# Patient Record
Sex: MALE | Race: WHITE | NOT HISPANIC OR LATINO | Employment: FULL TIME | URBAN - METROPOLITAN AREA
[De-identification: names, ages, dates, MRNs, and addresses within clinical notes are randomized per-mention and may not be internally consistent; named-entity substitution may affect disease eponyms.]

---

## 2017-01-28 ENCOUNTER — TRANSFERRED RECORDS (OUTPATIENT)
Dept: HEALTH INFORMATION MANAGEMENT | Facility: CLINIC | Age: 47
End: 2017-01-28

## 2017-02-02 ENCOUNTER — OFFICE VISIT (OUTPATIENT)
Dept: FAMILY MEDICINE | Facility: CLINIC | Age: 47
End: 2017-02-02
Payer: COMMERCIAL

## 2017-02-02 VITALS
TEMPERATURE: 98 F | BODY MASS INDEX: 29.91 KG/M2 | WEIGHT: 191 LBS | SYSTOLIC BLOOD PRESSURE: 138 MMHG | DIASTOLIC BLOOD PRESSURE: 78 MMHG | OXYGEN SATURATION: 96 % | HEART RATE: 110 BPM

## 2017-02-02 DIAGNOSIS — I10 HYPERTENSION GOAL BP (BLOOD PRESSURE) < 140/90: ICD-10-CM

## 2017-02-02 DIAGNOSIS — N28.9 ACUTE KIDNEY INSUFFICIENCY: Primary | ICD-10-CM

## 2017-02-02 DIAGNOSIS — F33.41 RECURRENT MAJOR DEPRESSIVE DISORDER, IN PARTIAL REMISSION (H): ICD-10-CM

## 2017-02-02 DIAGNOSIS — F41.1 GAD (GENERALIZED ANXIETY DISORDER): ICD-10-CM

## 2017-02-02 LAB
ALBUMIN UR-MCNC: 30 MG/DL
APPEARANCE UR: CLEAR
BACTERIA #/AREA URNS HPF: ABNORMAL /HPF
BILIRUB UR QL STRIP: ABNORMAL
COLOR UR AUTO: YELLOW
GLUCOSE UR STRIP-MCNC: NEGATIVE MG/DL
HGB UR QL STRIP: NEGATIVE
KETONES UR STRIP-MCNC: NEGATIVE MG/DL
LEUKOCYTE ESTERASE UR QL STRIP: NEGATIVE
MUCOUS THREADS #/AREA URNS LPF: PRESENT /LPF
NITRATE UR QL: NEGATIVE
NON-SQ EPI CELLS #/AREA URNS LPF: ABNORMAL /LPF
PH UR STRIP: 6 PH (ref 5–7)
RBC #/AREA URNS AUTO: ABNORMAL /HPF (ref 0–2)
SP GR UR STRIP: >1.03 (ref 1–1.03)
URN SPEC COLLECT METH UR: ABNORMAL
UROBILINOGEN UR STRIP-ACNC: 0.2 EU/DL (ref 0.2–1)
WBC #/AREA URNS AUTO: ABNORMAL /HPF (ref 0–2)

## 2017-02-02 PROCEDURE — 81001 URINALYSIS AUTO W/SCOPE: CPT | Performed by: FAMILY MEDICINE

## 2017-02-02 PROCEDURE — 36415 COLL VENOUS BLD VENIPUNCTURE: CPT | Performed by: FAMILY MEDICINE

## 2017-02-02 PROCEDURE — 80053 COMPREHEN METABOLIC PANEL: CPT | Performed by: FAMILY MEDICINE

## 2017-02-02 PROCEDURE — 99213 OFFICE O/P EST LOW 20 MIN: CPT | Performed by: FAMILY MEDICINE

## 2017-02-02 RX ORDER — VENLAFAXINE HYDROCHLORIDE 75 MG/1
225 CAPSULE, EXTENDED RELEASE ORAL EVERY MORNING
Qty: 90 CAPSULE | Refills: 1 | Status: SHIPPED | OUTPATIENT
Start: 2017-02-02 | End: 2017-02-02

## 2017-02-02 RX ORDER — VENLAFAXINE HYDROCHLORIDE 75 MG/1
225 CAPSULE, EXTENDED RELEASE ORAL DAILY
Qty: 270 CAPSULE | Refills: 1 | Status: SHIPPED | OUTPATIENT
Start: 2017-02-02 | End: 2017-08-24

## 2017-02-02 NOTE — NURSING NOTE
"No chief complaint on file.      Initial /78 mmHg  Pulse 110  Temp(Src) 98  F (36.7  C) (Oral)  Wt 191 lb (86.637 kg)  SpO2 96% Estimated body mass index is 29.91 kg/(m^2) as calculated from the following:    Height as of 8/31/16: 5' 7\" (1.702 m).    Weight as of this encounter: 191 lb (86.637 kg).  BP completed using cuff size: regular    Grace Desai MA    "

## 2017-02-02 NOTE — PROGRESS NOTES
SUBJECTIVE:                                                    Candelario Evangelista is a 47 year old male who presents to clinic today for the following health issues:    1/27/16: He was feeling light headed and dizzy, fell at work, blood pressure was checked and was 77/58 at its lowest. He went to the ER and rec'd 3 L of fluid, had EKG, BP's 109-126/70's while in the ER. He was also told that his creatinine was very high. He hadn't had much to eat or drink for more than 24 hours before.  Discharged after 4 hours.    History of Present Illness  Hypertension:     Outpatient blood pressures:  Are not being checked    Dietary sodium intake::  Not monitoring salt intake  Diet::  Regular (no restrictions)  Frequency of exercise::  1 day/week  Duration of exercise::  15-30 minutes  Taking medications regularly::  Yes  Medication side effects::  Lightheadedness  Additional concerns today::  YES      Pt would also like to discuss medications.     Problem list and histories reviewed & adjusted, as indicated.  Additional history: as documented    FRANDY/MMD  PHQ-9 SCORE 3/2/2016 8/31/2016 12/22/2016   Total Score - - -   Total Score 7 8 7    no change, feeling tired since was sick, looking for a house to live in currently so a little stress.    Patient Active Problem List   Diagnosis     Moderate recurrent major depression (H)     Generalized anxiety disorder     Internal hemorrhoids     Blood in stool     CARDIOVASCULAR SCREENING; LDL GOAL LESS THAN 160     Hypertension goal BP (blood pressure) < 140/90     Intermittent asthma     Overweight (BMI 25.0-29.9)     MRSA (methicillin resistant staph aureus) culture positive     Lump of axilla     Thumb laceration     Weight loss     Diverticulosis of large intestine     External hemorrhoids     Abdominal pain, generalized     Laryngitis     Past Surgical History   Procedure Laterality Date     Hernia repair, inguinal rt/lt       Hernia repair, inguinal rt/lt         Social History    Substance Use Topics     Smoking status: Never Smoker      Smokeless tobacco: Never Used     Alcohol Use: No     Family History   Problem Relation Age of Onset     C.A.D. Mother      C.A.D. Father      DIABETES Father      CEREBROVASCULAR DISEASE Father      Hypertension Mother      ?     HEART DISEASE Mother      Hypertension Father      ?     Cancer - colorectal Father          Allergies   Allergen Reactions     Latex Rash       ROS:  Constitutional, HEENT, cardiovascular, pulmonary, gi and gu systems are negative, except as otherwise noted.    OBJECTIVE:                                                    /78 mmHg  Pulse 110  Temp(Src) 98  F (36.7  C) (Oral)  Wt 191 lb (86.637 kg)  SpO2 96%  Body mass index is 29.91 kg/(m^2).  GENERAL: healthy, alert and no distress  NECK: no adenopathy, no asymmetry, masses, or scars and thyroid normal to palpation  RESP: lungs clear to auscultation - no rales, rhonchi or wheezes  CV: regular rate and rhythm, normal S1 S2, no S3 or S4, no murmur, click or rub, no peripheral edema and peripheral pulses strong  ABDOMEN: soft, nontender, no hepatosplenomegaly, no masses and bowel sounds normal  MS: no gross musculoskeletal defects noted, no edema    Diagnostic Test Results:  Results for orders placed or performed in visit on 02/02/17 (from the past 24 hour(s))   UA reflex to Microscopic and Culture   Result Value Ref Range    Color Urine Yellow     Appearance Urine Clear     Glucose Urine Negative NEG mg/dL    Bilirubin Urine (A) NEG     Small  This is an unconfirmed screening test result. A positive result may be false.      Ketones Urine Negative NEG mg/dL    Specific Gravity Urine >1.030 1.003 - 1.035    Blood Urine Negative NEG    pH Urine 6.0 5.0 - 7.0 pH    Protein Albumin Urine 30 (A) NEG mg/dL    Urobilinogen Urine 0.2 0.2 - 1.0 EU/dL    Nitrite Urine Negative NEG    Leukocyte Esterase Urine Negative NEG    Source Midstream Urine    Urine Microscopic   Result  Value Ref Range    WBC Urine O - 2 0 - 2 /HPF    RBC Urine O - 2 0 - 2 /HPF    Squamous Epithelial /LPF Urine Few FEW /LPF    Bacteria Urine Moderate (A) NEG /HPF    Mucous Urine Present (A) NEG /LPF        ASSESSMENT/PLAN:                                                    1. Acute kidney insufficiency  Dehydrated, recheck today shows SG still high, GFR and Cr still pending  Encourage lots of fluids  - UA reflex to Microscopic and Culture  - Comprehensive metabolic panel  - Urine Microscopic    2. Hypertension goal BP (blood pressure) < 140/90  BP Readings from Last 3 Encounters:   02/02/17 138/78   12/22/16 142/100   08/31/16 130/90    improved, follow closely    3. FRANDY (generalized anxiety disorder)  stable    4. Recurrent major depressive disorder, in partial remission (H)  Stable, he wants to continue on current dosage  - venlafaxine (EFFEXOR-XR) 75 MG 24 hr capsule; Take 3 capsules (225 mg) by mouth daily  Dispense: 270 capsule; Refill: 1      Norma Servin MD  [facility]  Answers for HPI/ROS submitted by the patient on 2/2/2017   PHQ-2 Depressed: Several days, Several days  PHQ-2 Score: 2

## 2017-02-02 NOTE — Clinical Note
[facility]   [address] [place]   [address]  [phone]    February 6, 2017      Candelario Evangelista  [address]  [facility] [address]              Dear Joaquín Jean CarlosDavid!  It was a pleasure as always to see you in clinic!  Thank you for getting labs done.     Great news, your kidneys have improved completely!  You must have had enough fluids, you're doing a great job of taking good care of yourself.     If you have any questions, please contact the clinic or schedule an appointment with me, thank you!    Results for orders placed or performed in visit on 02/02/17   UA reflex to Microscopic and Culture   Result Value Ref Range    Color Urine Yellow     Appearance Urine Clear     Glucose Urine Negative NEG mg/dL    Bilirubin Urine (A) NEG     Small  This is an unconfirmed screening test result. A positive result may be false.      Ketones Urine Negative NEG mg/dL    Specific Gravity Urine >1.030 1.003 - 1.035    Blood Urine Negative NEG    pH Urine 6.0 5.0 - 7.0 pH    Protein Albumin Urine 30 (A) NEG mg/dL    Urobilinogen Urine 0.2 0.2 - 1.0 EU/dL    Nitrite Urine Negative NEG    Leukocyte Esterase Urine Negative NEG    Source Midstream Urine    Comprehensive metabolic panel   Result Value Ref Range    Sodium 141 133 - 144 mmol/L    Potassium 3.8 3.4 - 5.3 mmol/L    Chloride 106 94 - 109 mmol/L    Carbon Dioxide 28 20 - 32 mmol/L    Anion Gap 7 3 - 14 mmol/L    Glucose 78 70 - 99 mg/dL    Urea Nitrogen 11 7 - 30 mg/dL    Creatinine 0.94 0.66 - 1.25 mg/dL    GFR Estimate 86 >60 mL/min/1.7m2    GFR Estimate If Black >90   GFR Calc   >60 mL/min/1.7m2    Calcium 9.1 8.5 - 10.1 mg/dL    Bilirubin Total 0.5 0.2 - 1.3 mg/dL    Albumin 3.7 3.4 - 5.0 g/dL    Protein Total 7.2 6.8 - 8.8 g/dL    Alkaline Phosphatase 140 40 - 150 U/L    ALT 26 0 - 70 U/L    AST 18 0 - 45 U/L   Urine Microscopic   Result Value Ref Range    WBC Urine O - 2 0 - 2 /HPF    RBC Urine O - 2  0 - 2 /HPF    Squamous Epithelial /LPF Urine Few FEW /LPF    Bacteria Urine Moderate (A) NEG /HPF    Mucous Urine Present (A) NEG /LPF           Sincerely,    Norma Servin MD/nr

## 2017-02-02 NOTE — PATIENT INSTRUCTIONS
Check your blood pressure several times a week and schedule an appointment if it's higher than 140/90.

## 2017-02-02 NOTE — MR AVS SNAPSHOT
"              After Visit Summary   2/2/2017    Candelario Evangelista    MRN: [id number]           Patient Information     Date Of Birth          1970        Visit Information        Provider Department      2/2/2017 11:00 AM Nroma Servin MD [facility]        Today's Diagnoses     Acute kidney insufficiency    -  1     Hypertension goal BP (blood pressure) < 140/90           Care Instructions    Check your blood pressure several times a week and schedule an appointment if it's higher than 140/90.        Follow-ups after your visit        Who to contact     If you have questions or need follow up information about today's clinic visit or your schedule please contact [facility] directly at [phone].  Normal or non-critical lab and imaging results will be communicated to you by MyChart, letter or phone within 4 business days after the clinic has received the results. If you do not hear from us within 7 days, please contact the clinic through MyChart or phone. If you have a critical or abnormal lab result, we will notify you by phone as soon as possible.  Submit refill requests through [employer] or call your pharmacy and they will forward the refill request to us. Please allow 3 business days for your refill to be completed.          Additional Information About Your Visit        MyChart Information     [employer] lets you send messages to your doctor, view your test results, renew your prescriptions, schedule appointments and more. To sign up, go to [email or web address] . Click on \"Log in\" on the left side of the screen, which will take you to the Welcome page. Then click on \"Sign up Now\" on the right side of the page.     You will be asked to enter the access code listed below, as well as some personal information. Please follow the directions to create your username and password.     Your access code is: [email or web address]  Expires: 3/22/2017  2:01 PM     Your access code will "  in 90 days. If you need help or a new code, please call your [place] clinic or [phone].        Care EveryWhere ID     This is your Care EveryWhere ID. This could be used by other organizations to access your [place] medical records  [id number]        Your Vitals Were     Pulse Temperature Pulse Oximetry             110 98  F (36.7  C) (Oral) 96%          Blood Pressure from Last 3 Encounters:   17 138/78   16 142/100   16 130/90    Weight from Last 3 Encounters:   17 191 lb (86.637 kg)   16 195 lb (88.451 kg)   16 195 lb (88.451 kg)              We Performed the Following     Comprehensive metabolic panel     UA reflex to Microscopic and Culture        Primary Care Provider Office Phone # Fax #    Norma Servin [phone]       [facility] [address]  [facility] [address]        Thank you!     Thank you for choosing [facility]  for your care. Our goal is always to provide you with excellent care. Hearing back from our patients is one way we can continue to improve our services. Please take a few minutes to complete the written survey that you may receive in the mail after your visit with us. Thank you!             Your Updated Medication List - Protect others around you: Learn how to safely use, store and throw away your medicines at www.disposemymeds.org.          This list is accurate as of: 17 11:32 AM.  Always use your most recent med list.                   Brand Name Dispense Instructions for use    ibuprofen 200 MG tablet    ADVIL/MOTRIN     Take 1-2 tablets (200-400 mg) by mouth every 8 hours as needed for mild pain       lisinopril 20 MG tablet    PRINIVIL/ZESTRIL    90 tablet    Take 1 tablet (20 mg) by mouth daily       venlafaxine 75 MG 24 hr capsule    EFFEXOR-XR    90 capsule    Take 3 capsules (225 mg) by mouth every morning Please schedule office appointment, thanks!

## 2017-02-03 LAB
ALBUMIN SERPL-MCNC: 3.7 G/DL (ref 3.4–5)
ALP SERPL-CCNC: 140 U/L (ref 40–150)
ALT SERPL W P-5'-P-CCNC: 26 U/L (ref 0–70)
ANION GAP SERPL CALCULATED.3IONS-SCNC: 7 MMOL/L (ref 3–14)
AST SERPL W P-5'-P-CCNC: 18 U/L (ref 0–45)
BILIRUB SERPL-MCNC: 0.5 MG/DL (ref 0.2–1.3)
BUN SERPL-MCNC: 11 MG/DL (ref 7–30)
CALCIUM SERPL-MCNC: 9.1 MG/DL (ref 8.5–10.1)
CHLORIDE SERPL-SCNC: 106 MMOL/L (ref 94–109)
CO2 SERPL-SCNC: 28 MMOL/L (ref 20–32)
CREAT SERPL-MCNC: 0.94 MG/DL (ref 0.66–1.25)
GFR SERPL CREATININE-BSD FRML MDRD: 86 ML/MIN/1.7M2
GLUCOSE SERPL-MCNC: 78 MG/DL (ref 70–99)
POTASSIUM SERPL-SCNC: 3.8 MMOL/L (ref 3.4–5.3)
PROT SERPL-MCNC: 7.2 G/DL (ref 6.8–8.8)
SODIUM SERPL-SCNC: 141 MMOL/L (ref 133–144)

## 2017-02-03 NOTE — PROGRESS NOTES
Quick Note:    Hello! It was a pleasure as always to see you in clinic! Thank you for getting labs done.     Great news, your kidneys have improved completely! You must have had enough fluids, you're doing a great job of taking good care of yourself.     If you have any questions, please contact the clinic or schedule an appointment with me, thank you!    Sincerely,  Dr. Norma Servin MD  2/3/2017  ______

## 2017-03-01 DIAGNOSIS — F41.1 GAD (GENERALIZED ANXIETY DISORDER): ICD-10-CM

## 2017-03-01 DIAGNOSIS — F33.41 RECURRENT MAJOR DEPRESSIVE DISORDER, IN PARTIAL REMISSION (H): ICD-10-CM

## 2017-03-02 NOTE — TELEPHONE ENCOUNTER
David,    Mr. Evangelista has a current rx on file, please close this encounter, I am unable to.    Thank you,  Michelle Mascorro CPhT  On behalf of [facility]

## 2017-03-03 RX ORDER — VENLAFAXINE HYDROCHLORIDE 75 MG/1
CAPSULE, EXTENDED RELEASE ORAL
Qty: 90 CAPSULE | Refills: 1 | OUTPATIENT
Start: 2017-03-03

## 2017-08-24 ENCOUNTER — TELEPHONE (OUTPATIENT)
Dept: FAMILY MEDICINE | Facility: CLINIC | Age: 47
End: 2017-08-24

## 2017-08-24 DIAGNOSIS — F33.41 RECURRENT MAJOR DEPRESSIVE DISORDER, IN PARTIAL REMISSION (H): ICD-10-CM

## 2017-08-24 NOTE — TELEPHONE ENCOUNTER
venlafaxine (EFFEXOR-XR) 75 MG 24 hr capsule    Last Written Prescription Date: 2/2/17  Last Fill Quantity: 270, # refills: 1  Last Office Visit with [facility], [facility] or [facility] prescribing provider: 2/2/17        BP Readings from Last 3 Encounters:   02/02/17 138/78   12/22/16 (!) 142/100   08/31/16 130/90     Pulse: (for Fetzima)  Creatinine   Date Value Ref Range Status   02/02/2017 0.94 0.66 - 1.25 mg/dL Final   ]    Last PHQ-9 score on record=   PHQ-9 SCORE 12/22/2016   Total Score -   Total Score 7

## 2017-08-28 RX ORDER — VENLAFAXINE HYDROCHLORIDE 75 MG/1
CAPSULE, EXTENDED RELEASE ORAL
Qty: 90 CAPSULE | Refills: 0 | Status: SHIPPED | OUTPATIENT
Start: 2017-08-28 | End: 2017-09-22

## 2017-08-28 NOTE — TELEPHONE ENCOUNTER
PHQ9 is due.  MA- please do one.    Last phq9 was high.  Sent in one month refill.  YESSENIA James

## 2017-09-22 RX ORDER — VENLAFAXINE HYDROCHLORIDE 75 MG/1
CAPSULE, EXTENDED RELEASE ORAL
Qty: 30 CAPSULE | Refills: 0 | Status: SHIPPED | OUTPATIENT
Start: 2017-09-22 | End: 2017-09-28

## 2017-09-22 NOTE — TELEPHONE ENCOUNTER
Reason for Call:  Medication or medication refill:    Do you use a [facility]?  Name of the pharmacy and phone number for the current request:  [facility] [place] - [phone]    Name of the medication requested: Venlafaxine    Other request: Pt has scheduled an appointment with Dr Servin for a medication renewal on 9/28. He only has a few left and was wondering if Dr. Servin can supply a few days worth in order for him to last until his appointment. Please see if Dr. Servin can approve thanks!    Can we leave a detailed message on this number? YES    Phone number patient can be reached at: Home number on file [phone] (home)    Best Time: anytime    Call taken on 9/22/2017 at 4:46 PM by Vivi Baca

## 2017-09-28 ENCOUNTER — OFFICE VISIT (OUTPATIENT)
Dept: FAMILY MEDICINE | Facility: CLINIC | Age: 47
End: 2017-09-28
Payer: COMMERCIAL

## 2017-09-28 VITALS
TEMPERATURE: 97.6 F | OXYGEN SATURATION: 99 % | WEIGHT: 185 LBS | BODY MASS INDEX: 29.03 KG/M2 | SYSTOLIC BLOOD PRESSURE: 119 MMHG | HEART RATE: 111 BPM | RESPIRATION RATE: 16 BRPM | DIASTOLIC BLOOD PRESSURE: 86 MMHG | HEIGHT: 67 IN

## 2017-09-28 DIAGNOSIS — F41.1 GENERALIZED ANXIETY DISORDER: ICD-10-CM

## 2017-09-28 DIAGNOSIS — F33.41 RECURRENT MAJOR DEPRESSIVE DISORDER, IN PARTIAL REMISSION (H): Primary | ICD-10-CM

## 2017-09-28 DIAGNOSIS — K14.8 TONGUE LESION: ICD-10-CM

## 2017-09-28 DIAGNOSIS — K08.9 POOR DENTITION: ICD-10-CM

## 2017-09-28 DIAGNOSIS — K12.0 ORAL APHTHAE: ICD-10-CM

## 2017-09-28 DIAGNOSIS — J45.20 INTERMITTENT ASTHMA, UNCOMPLICATED: ICD-10-CM

## 2017-09-28 PROCEDURE — 99214 OFFICE O/P EST MOD 30 MIN: CPT | Performed by: FAMILY MEDICINE

## 2017-09-28 RX ORDER — VENLAFAXINE HYDROCHLORIDE 75 MG/1
CAPSULE, EXTENDED RELEASE ORAL
Qty: 90 CAPSULE | Refills: 11 | Status: SHIPPED | OUTPATIENT
Start: 2017-09-28 | End: 2018-09-06

## 2017-09-28 ASSESSMENT — ANXIETY QUESTIONNAIRES
IF YOU CHECKED OFF ANY PROBLEMS ON THIS QUESTIONNAIRE, HOW DIFFICULT HAVE THESE PROBLEMS MADE IT FOR YOU TO DO YOUR WORK, TAKE CARE OF THINGS AT HOME, OR GET ALONG WITH OTHER PEOPLE: SOMEWHAT DIFFICULT
GAD7 TOTAL SCORE: 6
5. BEING SO RESTLESS THAT IT IS HARD TO SIT STILL: SEVERAL DAYS
1. FEELING NERVOUS, ANXIOUS, OR ON EDGE: SEVERAL DAYS
7. FEELING AFRAID AS IF SOMETHING AWFUL MIGHT HAPPEN: NOT AT ALL
2. NOT BEING ABLE TO STOP OR CONTROL WORRYING: SEVERAL DAYS
3. WORRYING TOO MUCH ABOUT DIFFERENT THINGS: SEVERAL DAYS
6. BECOMING EASILY ANNOYED OR IRRITABLE: SEVERAL DAYS

## 2017-09-28 ASSESSMENT — PATIENT HEALTH QUESTIONNAIRE - PHQ9
SUM OF ALL RESPONSES TO PHQ QUESTIONS 1-9: 5
5. POOR APPETITE OR OVEREATING: SEVERAL DAYS

## 2017-09-28 NOTE — LETTER
My Asthma Action Plan  Name: Candelario Evangelista   YOB: 1970  Date: 9/28/2017   My doctor: Norma Servin MD   My clinic: [facility]        My Control Medicine: None  My Rescue Medicine:    My Asthma Severity: intermittent  Avoid your asthma triggers:                GREEN ZONE   Good Control    I feel good    No cough or wheeze    Can work, sleep and play without asthma symptoms       Take your asthma control medicine every day.     1. If exercise triggers your asthma, take your rescue medication    15 minutes before exercise or sports, and    During exercise if you have asthma symptoms  2. Spacer to use with inhaler: If you have a spacer, make sure to use it with your inhaler             YELLOW ZONE Getting Worse  I have ANY of these:    I do not feel good    Cough or wheeze    Chest feels tight    Wake up at night   1. Keep taking your Green Zone medications  2. Start taking your rescue medicine:    every 20 minutes for up to 1 hour. Then every 4 hours for 24-48 hours.  3. If you stay in the Yellow Zone for more than 12-24 hours, contact your doctor.  4. If you do not return to the Green Zone in 12-24 hours or you get worse, start taking your oral steroid medicine if prescribed by your provider.           RED ZONE Medical Alert - Get Help  I have ANY of these:    I feel awful    Medicine is not helping    Breathing getting harder    Trouble walking or talking    Nose opens wide to breathe       1. Take your rescue medicine NOW  2. If your provider has prescribed an oral steroid medicine, start taking it NOW  3. Call your doctor NOW  4. If you are still in the Red Zone after 20 minutes and you have not reached your doctor:    Take your rescue medicine again and    Call 911 or go to the emergency room right away    See your regular doctor within 2 weeks of an Emergency Room or Urgent Care visit for follow-up treatment.        Electronically signed by: rGace Desai, September 28,  2017    Annual Reminders:  Meet with Asthma Educator,  Flu Shot in the Fall, consider Pneumonia Vaccination for patients with asthma (aged 19 and older).    Pharmacy:    [facility] [place] [facility] [place], [place] - [address] [facility] [place], [place] - [address]                    Asthma Triggers  How To Control Things That Make Your Asthma Worse    Triggers are things that make your asthma worse.  Look at the list below to help you find your triggers and what you can do about them.  You can help prevent asthma flare-ups by staying away from your triggers.      Trigger                                                          What you can do   Cigarette Smoke  Tobacco smoke can make asthma worse. Do not allow smoking in your home, car or around you.  Be sure no one smokes at a child s day care or school.  If you smoke, ask your health care provider for ways to help you quit.  Ask family members to quit too.  Ask your health care provider for a referral to Quit Plan to help you quit smoking, or call 1-351-818-PLAN.     Colds, Flu, Bronchitis  These are common triggers of asthma. Wash your hands often.  Don t touch your eyes, nose or mouth.  Get a flu shot every year.     Dust Mites  These are tiny bugs that live in cloth or carpet. They are too small to see. Wash sheets and blankets in hot water every week.   Encase pillows and mattress in dust mite proof covers.  Avoid having carpet if you can. If you have carpet, vacuum weekly.   Use a dust mask and HEPA vacuum.   Pollen and Outdoor Mold  Some people are allergic to trees, grass, or weed pollen, or molds. Try to keep your windows closed.  Limit time out doors when pollen count is high.   Ask you health care provider about taking medicine during allergy season.     Animal Dander  Some people are allergic to skin flakes, urine or saliva from pets with fur or feathers. Keep pets with fur or feathers out of your home.    If you can t keep the  pet outdoors, then keep the pet out of your bedroom.  Keep the bedroom door closed.  Keep pets off cloth furniture and away from stuffed toys.     Mice, Rats, and Cockroaches  Some people are allergic to the waste from these pests.   Cover food and garbage.  Clean up spills and food crumbs.  Store grease in the refrigerator.   Keep food out of the bedroom.   Indoor Mold  This can be a trigger if your home has high moisture. Fix leaking faucets, pipes, or other sources of water.   Clean moldy surfaces.  Dehumidify basement if it is damp and smelly.   Smoke, Strong Odors, and Sprays  These can reduce air quality. Stay away from strong odors and sprays, such as perfume, powder, hair spray, paints, smoke incense, paint, cleaning products, candles and new carpet.   Exercise or Sports  Some people with asthma have this trigger. Be active!  Ask your doctor about taking medicine before sports or exercise to prevent symptoms.    Warm up for 5-10 minutes before and after sports or exercise.     Other Triggers of Asthma  Cold air:  Cover your nose and mouth with a scarf.  Sometimes laughing or crying can be a trigger.  Some medicines and food can trigger asthma.

## 2017-09-28 NOTE — NURSING NOTE
"Chief Complaint   Patient presents with     Depression       Initial /86  Pulse 111  Temp 97.6  F (36.4  C) (Tympanic)  Resp 16  Ht 5' 7\" (1.702 m)  Wt 185 lb (83.9 kg)  SpO2 99%  BMI 28.98 kg/m2 Estimated body mass index is 28.98 kg/(m^2) as calculated from the following:    Height as of this encounter: 5' 7\" (1.702 m).    Weight as of this encounter: 185 lb (83.9 kg).  Medication Reconciliation: complete     Grace Desai MA    "

## 2017-09-28 NOTE — MR AVS SNAPSHOT
After Visit Summary   9/28/2017    Candelario Evangelista    MRN: [id number]           Patient Information     Date Of Birth          1970        Visit Information        Provider Department      9/28/2017 1:20 PM Norma Servin MD [facility]        Today's Diagnoses     Recurrent major depressive disorder, in partial remission (H)    -  1    Generalized anxiety disorder        Intermittent asthma, uncomplicated        Tongue lesion        Oral aphthae        Poor dentition           Follow-ups after your visit        Additional Services     DENTAL REFERRAL       Your provider has referred you to: [facility]: Dental Clinic [facility] [phone]   [email or web address]    Type: routine dental care  Urgency: Routine  Please be aware that coverage of these services is subject to the terms and limitations of your health insurance plan.  Call member services at your health plan with any benefit or coverage questions.      Please bring the following with you to your appointment:    (1) Any X-Rays, CTs or MRIs which have been performed.  Contact the facility where they were done to arrange for  prior to your scheduled appointment.  Any new CT, MRI or other procedures ordered by your specialist must be performed at a [place] facility or coordinated by your clinic's referral office.    (2) List of current medications   (3) This referral request   (4) Any documents/labs given to you for this referral            OTOLARYNGOLOGY REFERRAL       Your provider has referred you to:   [facility]: Adult Ear, Nose and Throat Clinic (Otolaryngology) [facility] [phone]  [email or web address]  N: [facility] [phone] [email or web address]    Please be aware that coverage of these services is subject to the terms and limitations of your health insurance plan.  Call member services at  "your health plan with any benefit or coverage questions.      Please bring the following with you to your appointment:    (1) Any X-Rays, CTs or MRIs which have been performed.  Contact the facility where they were done to arrange for  prior to your scheduled appointment.   (2) List of current medications  (3) This referral request   (4) Any documents/labs given to you for this referral                  Who to contact     If you have questions or need follow up information about today's clinic visit or your schedule please contact [facility] directly at [phone].  Normal or non-critical lab and imaging results will be communicated to you by [employer], letter or phone within 4 business days after the clinic has received the results. If you do not hear from us within 7 days, please contact the clinic through [employer] or phone. If you have a critical or abnormal lab result, we will notify you by phone as soon as possible.  Submit refill requests through [employer] or call your pharmacy and they will forward the refill request to us. Please allow 3 business days for your refill to be completed.          Additional Information About Your Visit        MyChart Information     [employer] lets you send messages to your doctor, view your test results, renew your prescriptions, schedule appointments and more. To sign up, go to [email or web address] . Click on \"Log in\" on the left side of the screen, which will take you to the Welcome page. Then click on \"Sign up Now\" on the right side of the page.     You will be asked to enter the access code listed below, as well as some personal information. Please follow the directions to create your username and password.     Your access code is: [email or web address]  Expires: 2017  2:16 PM     Your access code will  in 90 days. If you need help or a new code, please call your [facility] or [phone].        Care EveryWhere ID     This is your Care " "EveryWhere ID. This could be used by other organizations to access your [place] medical records  [id number]        Your Vitals Were     Pulse Temperature Respirations Height Pulse Oximetry BMI (Body Mass Index)    111 97.6  F (36.4  C) (Tympanic) 16 5' 7\" (1.702 m) 99% 28.98 kg/m2       Blood Pressure from Last 3 Encounters:   09/28/17 119/86   02/02/17 138/78   12/22/16 (!) 142/100    Weight from Last 3 Encounters:   09/28/17 185 lb (83.9 kg)   02/02/17 191 lb (86.6 kg)   12/22/16 195 lb (88.5 kg)              We Performed the Following     DENTAL REFERRAL     OTOLARYNGOLOGY REFERRAL          Today's Medication Changes          These changes are accurate as of: 9/28/17  2:16 PM.  If you have any questions, ask your nurse or doctor.               Stop taking these medicines if you haven't already. Please contact your care team if you have questions.     lisinopril 20 MG tablet   Commonly known as:  PRINIVIL/ZESTRIL   Stopped by:  Norma Servin MD                Where to get your medicines      These medications were sent to [facility] [address]  [address] [facility] [address]     Phone:  [phone]     venlafaxine 75 MG 24 hr capsule                Primary Care Provider Office Phone # Fax #    Norma Servin [phone]       [address]  [facility] [address]        Equal Access to Services     CÉSAR STANFORD : Hadii gregory muir Soalisia, waaxda luqadaha, qaybta kaalmada alejandra, frida greco. So [facility] [phone].    ATENCIÓN: Si habla español, tiene a duarte disposición servicios gratuitos de asistencia lingüística. Llame al [phone].    We comply with applicable federal civil rights laws and [place] laws. We do not discriminate on the basis of race, color, national origin, age, disability sex, sexual orientation or gender identity.            Thank you!     Thank you for choosing [facility]  for your care. Our goal is always to provide you with excellent care. Hearing back from our patients is one way we can continue to improve our services. Please take a few minutes to complete the written survey that you may receive in the mail after your visit with us. Thank you!             Your Updated Medication List - Protect others around you: Learn how to safely use, store and throw away your medicines at www.disposemymeds.org.          This list is accurate as of: 9/28/17  2:16 PM.  Always use your most recent med list.                   Brand Name Dispense Instructions for use Diagnosis    ibuprofen 200 MG tablet    ADVIL/MOTRIN     Take 1-2 tablets (200-400 mg) by mouth every 8 hours as needed for mild pain        venlafaxine 75 MG 24 hr capsule    EFFEXOR-XR    90 capsule    TAKE THREE CAPSULES BY MOUTH EVERY DAY    Recurrent major depressive disorder, in partial remission (H)

## 2017-09-28 NOTE — PROGRESS NOTES
SUBJECTIVE:   Candelario Evangelista is a 47 year old male who presents to clinic today for the following health issues:    Canker sore  Started 4 weeks ago, describes pain that was initially severe enough to cause difficulty eating, is getting better now. It's been bleeding as well. No prior severe sores, haven't lasted this long before.      Depression and Anxiety Follow-Up    Status since last visit: No change    Other associated symptoms:None    Complicating factors:     Significant life event: No     Current substance abuse: None    PHQ-9 SCORE 8/31/2016 12/22/2016 9/28/2017   Total Score - - -   Total Score 8 7 5     FRANDY-7 SCORE 3/2/2016 9/28/2017   Total Score 7 6       PHQ-9  English  PHQ-9   Any Language  GAD7  Asthma Follow-Up    Was ACT completed today?    Yes    ACT Total Scores 9/28/2017   ACT TOTAL SCORE -   ASTHMA ER VISITS -   ASTHMA HOSPITALIZATIONS -   ACT TOTAL SCORE (Goal Greater than or Equal to 20) 23   In the past 12 months, how many times did you visit the emergency room for your asthma without being admitted to the hospital? 0   In the past 12 months, how many times were you hospitalized overnight because of your asthma? 0       Recent asthma triggers that patient is dealing with: None      Amount of exercise or physical activity: None    Problems taking medications regularly: No    Medication side effects: none  Diet: regular (no restrictions)      Patient Active Problem List   Diagnosis     Moderate recurrent major depression (H)     Generalized anxiety disorder     Internal hemorrhoids     Blood in stool     CARDIOVASCULAR SCREENING; LDL GOAL LESS THAN 160     Hypertension goal BP (blood pressure) < 140/90     Intermittent asthma     Overweight (BMI 25.0-29.9)     MRSA (methicillin resistant staph aureus) culture positive     Lump of axilla     Thumb laceration     Weight loss     Diverticulosis of large intestine     External hemorrhoids     Abdominal pain, generalized     Laryngitis     Past  "Surgical History:   Procedure Laterality Date     HERNIA REPAIR, INGUINAL RT/LT       HERNIA REPAIR, INGUINAL RT/LT         Social History   Substance Use Topics     Smoking status: Never Smoker     Smokeless tobacco: Never Used     Alcohol use No     Family History   Problem Relation Age of Onset     C.A.D. Mother      Hypertension Mother      ?     HEART DISEASE Mother      C.A.D. Father      DIABETES Father      CEREBROVASCULAR DISEASE Father      Hypertension Father      ?     Cancer - colorectal Father          Allergies   Allergen Reactions     Latex Rash     BP Readings from Last 3 Encounters:   09/28/17 119/86   02/02/17 138/78   12/22/16 (!) 142/100    Wt Readings from Last 3 Encounters:   09/28/17 185 lb (83.9 kg)   02/02/17 191 lb (86.6 kg)   12/22/16 195 lb (88.5 kg)                        Reviewed and updated as needed this visit by clinical staffTobacco  Allergies  Meds  Med Hx  Surg Hx  Fam Hx  Soc Hx      Reviewed and updated as needed this visit by Provider         ROS:  Constitutional, HEENT, cardiovascular, pulmonary, GI, , musculoskeletal, neuro, skin, endocrine and psych systems are negative, except as otherwise noted.      OBJECTIVE:   /86  Pulse 111  Temp 97.6  F (36.4  C) (Tympanic)  Resp 16  Ht 5' 7\" (1.702 m)  Wt 185 lb (83.9 kg)  SpO2 99%  BMI 28.98 kg/m2  Body mass index is 28.98 kg/(m^2).   Wt Readings from Last 4 Encounters:   09/28/17 185 lb (83.9 kg)   02/02/17 191 lb (86.6 kg)   12/22/16 195 lb (88.5 kg)   08/31/16 195 lb (88.5 kg)     GENERAL: healthy, alert and no distress  EYES: Eyes grossly normal to inspection, PERRL and conjunctivae and sclerae normal  HENT: normal cephalic/atraumatic, ear canals and TM's normal, nose and mouth without ulcers or lesions, oropharynx clear, oral mucous membranes moist and right lateral tongue: irregular eroson measuring apprx 1 cm in width, : 0.5 cm in depth, not currently bleeding, tender to touch  Poor dentition " noted  NECK: no adenopathy, no asymmetry, masses, or scars and thyroid normal to palpation  RESP: lungs clear to auscultation - no rales, rhonchi or wheezes  CV: regular rate and rhythm, normal S1 S2, no S3 or S4, no murmur, click or rub, no peripheral edema and peripheral pulses strong  MS: no gross musculoskeletal defects noted, no edema  SKIN: no suspicious lesions or rashes  NEURO: Normal strength and tone, mentation intact and speech normal  PSYCH: mentation appears normal, affect normal/bright    ASSESSMENT/PLAN:     1. Recurrent major depressive disorder, in partial remission (H)  PHQ-9 SCORE 8/31/2016 12/22/2016 9/28/2017   Total Score - - -   Total Score 8 7 5    The current medical regimen is effective;  continue present plan and medications.   - venlafaxine (EFFEXOR-XR) 75 MG 24 hr capsule; TAKE THREE CAPSULES BY MOUTH EVERY DAY  Dispense: 90 capsule; Refill: 11    2. Generalized anxiety disorder  FRANDY-7 SCORE 3/2/2016 9/28/2017   Total Score 7 6      The current medical regimen is effective;  continue present plan and medications.     3. Intermittent asthma, uncomplicated  At goal, controlled     4. Tongue lesion  New problem, etiology unclear, prognosis unclear.  Concern for possibly tongue cancer  Certainly could be unusually deep, large canker sore, but duration of lesion (one month) associated with bleeding and irregular size and depth of lesion are all unusual for canker sores and concerning for erosive malignancy.  I did discuss this with patient (my concern about possible cancer) and recommend he be june within a few weeks, patient agreed and understood.  - OTOLARYNGOLOGY REFERRAL    5. Oral aphthae  See above    6. Poor dentition  Noted today, recommend regular dental care, dental referral made  - DENTAL REFERRAL    Return to clinic 6 months for routine fu    Norma Servin MD  [facility]

## 2017-09-29 ASSESSMENT — ASTHMA QUESTIONNAIRES: ACT_TOTALSCORE: 23

## 2017-09-29 ASSESSMENT — ANXIETY QUESTIONNAIRES: GAD7 TOTAL SCORE: 6

## 2017-10-06 ENCOUNTER — TELEPHONE (OUTPATIENT)
Dept: FAMILY MEDICINE | Facility: CLINIC | Age: 47
End: 2017-10-06

## 2017-10-06 NOTE — TELEPHONE ENCOUNTER
Just wondering if patient can get a three month supply on his venlafaxine xr 75mg capsules.  If it is possible, could you send a new rx?    Thanks  Rimma Jimenez [facility]   on behalf of [facility]

## 2017-10-13 ENCOUNTER — TELEPHONE (OUTPATIENT)
Dept: FAMILY MEDICINE | Facility: CLINIC | Age: 47
End: 2017-10-13

## 2017-10-13 NOTE — TELEPHONE ENCOUNTER
Called patient, reviewed message per Dr. Servin.    Patient verbalized understanding and stated he only called the first clinic listed on ENT referral.    Writer informed patient he is welcome to call other ENT clinic to see if he can be evaluated sooner.    Patient verbalized understanding and in agreement with plan.  CALLY AlvarezN, RN

## 2017-10-13 NOTE — TELEPHONE ENCOUNTER
Dr. Servin-Please advise if you recommend patient be seen sooner than 10/31/17 for tongue lesion.    Thank you!  ALEXUS yHde, CALLYN, RN

## 2017-10-13 NOTE — TELEPHONE ENCOUNTER
Patient called explaining he left a note with the pharmacy and they were apparently going to pass on to Dr Servin re: ENT appt    Patient states he has an appt on 10/31/17 with ENT but gave a number that Dr Servin would need to call to get him in sooner?    Ok to leave message

## 2017-10-25 ENCOUNTER — PRE VISIT (OUTPATIENT)
Dept: OTOLARYNGOLOGY | Facility: CLINIC | Age: 47
End: 2017-10-25

## 2017-10-25 NOTE — TELEPHONE ENCOUNTER
"APPT INFO    Date /Time:  10/31/17 at 2:30PM   Reason for Appt:  tongue lesion, mouth sore   Ref Provider/Clinic:  Norma Servin @ [facility]   Patient Contact (Y/N) & Call Details:  no, referred   Action: Called and spoke to pt, he was seen at his work clinic.  Pt stated they just took a look and gave him mouth paste.  Nothing else was done.     RECORDS CLINIC NAME  (\"None\" if no records ) RECEIVED RECS & IMG? Y/N   (may include other helpful notes)   Internal Clinics:  [facility]  yes - records in [facility]        External Clinics:  none             "

## 2017-10-31 ENCOUNTER — OFFICE VISIT (OUTPATIENT)
Dept: OTOLARYNGOLOGY | Facility: CLINIC | Age: 47
End: 2017-10-31

## 2017-10-31 VITALS — BODY MASS INDEX: 29.51 KG/M2 | HEIGHT: 67 IN | WEIGHT: 188 LBS

## 2017-10-31 DIAGNOSIS — K13.79 SORE IN MOUTH: Primary | ICD-10-CM

## 2017-10-31 DIAGNOSIS — R07.0 THROAT PAIN: ICD-10-CM

## 2017-10-31 RX ORDER — CLOTRIMAZOLE 10 MG/1
LOZENGE ORAL
Qty: 70 TROCHE | Refills: 1 | Status: SHIPPED | OUTPATIENT
Start: 2017-10-31 | End: 2017-11-14

## 2017-10-31 ASSESSMENT — PAIN SCALES - GENERAL: PAINLEVEL: NO PAIN (0)

## 2017-10-31 NOTE — PATIENT INSTRUCTIONS
The plan will be to use the medicen Dr Pedroza provided and if sore on tongue not better please follow up in 3-4 weeks. Your next lyn. Is for 11-28 @ 3:15 to see Dr Pedroza.  Jose Hartman ,RN  [phone]

## 2017-10-31 NOTE — PROGRESS NOTES
Dear Dr. Servin:    I had the pleasure of meeting Candelario Evangelista in consultation today at the [facility] at your request.     History of Present Illness:     HISTORY OF PRESENT ILLNESS:  Candelario Evangelista is a pleasant 47-year-old male.  He has a history of a right-sided sore tongue lesion.  He is a nonsmoker, nondrinker.  This has been present for more than six or eight weeks.  It is quite concerning to him.  He is not diabetic.   No other current complaints.  He has not had other types of problems within his oral cavity in the past.  He also says that he will get a little bit of pain almost in the piriform sinus on the right as well.  He is exposed to secondhand smoke as a .                   MEDICATIONS:     Current Outpatient Prescriptions   Medication Sig Dispense Refill     clotrimazole 10 MG mishel Allow 1 mishel to dissolve slowly in mouth 5 times daily for 14 days 70 Mishel 1     venlafaxine (EFFEXOR-XR) 75 MG 24 hr capsule TAKE THREE CAPSULES BY MOUTH EVERY DAY 90 capsule 11     ibuprofen (ADVIL,MOTRIN) 200 MG tablet Take 1-2 tablets (200-400 mg) by mouth every 8 hours as needed for mild pain         ALLERGIES:    Allergies   Allergen Reactions     Latex Rash       HABITS/SOCIAL HISTORY: non smoker    PAST MEDICAL HISTORY:   Past Medical History:   Diagnosis Date     Anxiety      Depressive disorder      GENERALIZED ANXIETY DIS 8/31/2007     GERD (gastroesophageal reflux disease)      Head injury      Hypertension      Intermittent asthma 2/15/2011     Moderate recurrent major depression (H) 8/31/2007    Sees a Psychiatrist. Does not want to fill out a PHQ - 9     Problem, psychiatric         FAMILY HISTORY:    Family History   Problem Relation Age of Onset     C.A.D. Mother      Hypertension Mother      ?     HEART DISEASE Mother      MENTAL ILLNESS Mother      Depression Mother      C.A.D. Father      DIABETES Father      CEREBROVASCULAR DISEASE Father       Hypertension Father      ?     Cancer - colorectal Father        REVIEW OF SYSTEMS:  12 point ROS was negative other than the symptoms noted above in the HPI.    PHYSICAL EXAMINATION: PHYSICAL EXAMINATION:    Constitutional:  The patient was unaccompanied, well-groomed, and in no acute distress.    Skin:  Warm and pink.    Neurologic:  Alert and oriented x 3.  CN's III-XII within normal limits.  Voice normal.   Psychiatric:  The patient's affect was calm, cooperative, and appropriate.    Respiratory:  Breathing comfortably without stridor or exertion of accessory muscles.    Eyes: Pupils were equal and reactive.  Extraocular movement intact.    Head:  Normocephalic and atraumatic.  No lesions or scars.    Ears:  Pinnae and tragus non-tender.  EAC's and TM's were clear.     Nose:  Sinuses were non-tender.  Anterior rhinoscopy revealed midline septum and absence of purulence or polyps.    OC/OP:  Normal tongue, floor of mouth, buccal mucosa, and palate.  Small 7 mm tongue induration on inspection and right   palpation.  No abnormal lymph tissue in the oropharynx.  The pterygoid region is non-tender.    HNeck:  Supple with normal laryngeal and tracheal landmarks.  The parotid beds were without masses.  No palpable thyroid.  Lymphatic:  There is no palpable lymphadenopathy in the neck.       Fiberoptic Endoscopy:  Consent for fiberoptic laryngoscopy was obtained, and we confirmed correctness of procedure and identity of patient.  Fiberoptic laryngoscopy was indicated due to hypopharunx pain and  Smoke exposure The nose was topically decongested and anesthetized.  The fiberoptic laryngoscope was passed under endoscopic vision.  The turbinates were normal.  The inferior and middle meati were clear bilaterally without purulence, masses, or polyps.  The nasopharynx was clear.  The Eustachian tubes were clear.  The soft palate appeared normal with good mobility.  The epiglottis was sharp and the visualized portion of the  vallecula was clear.  The larynx was clear with mobile cords.  The arytenoids were clear and there was no pooling in the hypopharynx.          ASSESSMENT:  Patient with a history of a right-sided tongue lesion.  It is a little bit endophytic and is a little bit concerning.  It is probably just a chronic ulcer, but I am going to make sure that he does not get lost to followup.      PLAN:  We are going to go ahead and treat this with gentian violet which we did in clinic today.  He will followup with Mycelex and if he comes back in two to three weeks and it is still present, we are going to biopsy it.        FO/ms       Thank you very much for the opportunity to participate in the care of your patient.      Anthony Pedroza M.D.  Otolaryngology- Head & Neck Surgery  [phone]

## 2017-10-31 NOTE — MR AVS SNAPSHOT
After Visit Summary   10/31/2017    Candelario Evangelista    MRN: [id number]           Patient Information     Date Of Birth          1970        Visit Information        Provider Department      10/31/2017 2:30 PM Anthony Pedroza MD  [facility] N[facility] and Th[facility]        Today's Diagnoses     Sore in mouth    -  1      Care Instructions    The plan will be to use the medicen Dr Pedroza provided and if sore on tongue not better please follow up in 3-4 weeks. Your next lyn. Is for  @ 3:15 to see Dr Pedroza.  Jose HartmanRN  [phone]              Follow-ups after your visit        Who to contact     Please call your clinic at [phone] to:    Ask questions about your health    Make or cancel appointments    Discuss your medicines    Learn about your test results    Speak to your doctor   If you have compliments or concerns about an experience at your clinic, or if you wish to file a complaint, please contact [facility] Patient Relations at [phone] or email us at [email or web address]         Additional Information About Your Visit        MyChart Information     Dolosyst is an electronic gateway that provides easy, online access to your medical records. With TradeHarbor, you can request a clinic appointment, read your test results, renew a prescription or communicate with your care team.     To sign up for TradeHarbor visit the website at [email or web address]   You will be asked to enter the access code listed below, as well as some personal information. Please follow the directions to create your username and password.     Your access code is: [email or web address]  Expires: 2017  2:16 PM     Your access code will  in 90 days. If you need help or a new code, please contact your [facility] or call [phone] for assistance.        Care EveryWhere ID     This is your Care EveryWhere ID. This could be used by  "other organizations to access your [facility] medical records  [id number]        Your Vitals Were     Height BMI (Body Mass Index)                1.7 m (5' 6.93\") 29.51 kg/m2           Blood Pressure from Last 3 Encounters:   09/28/17 119/86   02/02/17 138/78   12/22/16 (!) 142/100    Weight from Last 3 Encounters:   10/31/17 85.3 kg (188 lb)   09/28/17 83.9 kg (185 lb)   02/02/17 86.6 kg (191 lb)              Today, you had the following     No orders found for display         Today's Medication Changes          These changes are accurate as of: 10/31/17  3:31 PM.  If you have any questions, ask your nurse or doctor.               Start taking these medicines.        Dose/Directions    clotrimazole 10 MG sven   Used for:  Sore in mouth   Started by:  Anthony Pedroza MD        Allow 1 sven to dissolve slowly in mouth 5 times daily for 14 days   Quantity:  70 Sven   Refills:  1            Where to get your medicines      These medications were sent to [facility] [address]  [address] [facility] [address]     Phone:  [phone]     clotrimazole 10 MG sven                Primary Care Provider Office Phone # Fax #    Norma Chichi Servin [phone]       [address]  [facility] [address]        Equal Access to Services     [facility]: Hadii gregory Albarran, waaxda lucarly, qaybta kaalmafrida jiménez adeanderson greco. So [facility] [phone].    ATENCIÓN: Si habla español, tiene a duarte disposición servicios gratuitos de asistencia lingüística. Llame al [phone].    We comply with applicable federal civil rights laws and [place] laws. We do not discriminate on the basis of race, color, national origin, age, disability, sex, sexual orientation, or gender identity.            Thank you!     Thank you for choosing [facility] EAR NOSE AND THROAT  for your care. Our goal is always to provide you with " excellent care. Hearing back from our patients is one way we can continue to improve our services. Please take a few minutes to complete the written survey that you may receive in the mail after your visit with us. Thank you!             Your Updated Medication List - Protect others around you: Learn how to safely use, store and throw away your medicines at www.disposemymeds.org.          This list is accurate as of: 10/31/17  3:31 PM.  Always use your most recent med list.                   Brand Name Dispense Instructions for use Diagnosis    clotrimazole 10 MG mishel     70 Mishel    Allow 1 mishel to dissolve slowly in mouth 5 times daily for 14 days    Sore in mouth       ibuprofen 200 MG tablet    ADVIL/MOTRIN     Take 1-2 tablets (200-400 mg) by mouth every 8 hours as needed for mild pain        venlafaxine 75 MG 24 hr capsule    EFFEXOR-XR    90 capsule    TAKE THREE CAPSULES BY MOUTH EVERY DAY    Recurrent major depressive disorder, in partial remission (H)

## 2017-10-31 NOTE — LETTER
10/31/2017       RE: Candelario Evangelista  [address]  [place] [facility] [address]     Dear Colleague,    Thank you for referring your patient, Candelario Evangelista, to the [facility] TH[facility] at [facility]. Please see a copy of my visit note below.    Dear Dr. Servin:    I had the pleasure of meeting Candelario Evangelista in consultation today at the [facility] at your request.     History of Present Illness:     HISTORY OF PRESENT ILLNESS:  Candelario Evangelista is a pleasant 47-year-old male.  He has a history of a right-sided sore tongue lesion.  He is a nonsmoker, nondrinker.  This has been present for more than six or eight weeks.  It is quite concerning to him.  He is not diabetic.   No other current complaints.  He has not had other types of problems within his oral cavity in the past.  He also says that he will get a little bit of pain almost in the piriform sinus on the right as well.  He is exposed to secondhand smoke as a .                   MEDICATIONS:     Current Outpatient Prescriptions   Medication Sig Dispense Refill     clotrimazole 10 MG mishel Allow 1 mishel to dissolve slowly in mouth 5 times daily for 14 days 70 Mishle 1     venlafaxine (EFFEXOR-XR) 75 MG 24 hr capsule TAKE THREE CAPSULES BY MOUTH EVERY DAY 90 capsule 11     ibuprofen (ADVIL,MOTRIN) 200 MG tablet Take 1-2 tablets (200-400 mg) by mouth every 8 hours as needed for mild pain         ALLERGIES:    Allergies   Allergen Reactions     Latex Rash       HABITS/SOCIAL HISTORY: non smoker    PAST MEDICAL HISTORY:   Past Medical History:   Diagnosis Date     Anxiety      Depressive disorder      GENERALIZED ANXIETY DIS 8/31/2007     GERD (gastroesophageal reflux disease)      Head injury      Hypertension      Intermittent asthma 2/15/2011     Moderate recurrent major depression (H) 8/31/2007    Sees a Psychiatrist. Does not want to fill out a PHQ - 9      Problem, psychiatric         FAMILY HISTORY:    Family History   Problem Relation Age of Onset     C.A.D. Mother      Hypertension Mother      ?     HEART DISEASE Mother      MENTAL ILLNESS Mother      Depression Mother      C.A.D. Father      DIABETES Father      CEREBROVASCULAR DISEASE Father      Hypertension Father      ?     Cancer - colorectal Father        REVIEW OF SYSTEMS:  12 point ROS was negative other than the symptoms noted above in the HPI.    PHYSICAL EXAMINATION: PHYSICAL EXAMINATION:    Constitutional:  The patient was unaccompanied, well-groomed, and in no acute distress.    Skin:  Warm and pink.    Neurologic:  Alert and oriented x 3.  CN's III-XII within normal limits.  Voice normal.   Psychiatric:  The patient's affect was calm, cooperative, and appropriate.    Respiratory:  Breathing comfortably without stridor or exertion of accessory muscles.    Eyes: Pupils were equal and reactive.  Extraocular movement intact.    Head:  Normocephalic and atraumatic.  No lesions or scars.    Ears:  Pinnae and tragus non-tender.  EAC's and TM's were clear.     Nose:  Sinuses were non-tender.  Anterior rhinoscopy revealed midline septum and absence of purulence or polyps.    OC/OP:  Normal tongue, floor of mouth, buccal mucosa, and palate.  Small 7 mm tongue induration on inspection and right   palpation.  No abnormal lymph tissue in the oropharynx.  The pterygoid region is non-tender.    HNeck:  Supple with normal laryngeal and tracheal landmarks.  The parotid beds were without masses.  No palpable thyroid.  Lymphatic:  There is no palpable lymphadenopathy in the neck.       Fiberoptic Endoscopy:  Consent for fiberoptic laryngoscopy was obtained, and we confirmed correctness of procedure and identity of patient.  Fiberoptic laryngoscopy was indicated due to hypopharunx pain and  Smoke exposure The nose was topically decongested and anesthetized.  The fiberoptic laryngoscope was passed under endoscopic  vision.  The turbinates were normal.  The inferior and middle meati were clear bilaterally without purulence, masses, or polyps.  The nasopharynx was clear.  The Eustachian tubes were clear.  The soft palate appeared normal with good mobility.  The epiglottis was sharp and the visualized portion of the vallecula was clear.  The larynx was clear with mobile cords.  The arytenoids were clear and there was no pooling in the hypopharynx.          ASSESSMENT:  Patient with a history of a right-sided tongue lesion.  It is a little bit endophytic and is a little bit concerning.  It is probably just a chronic ulcer, but I am going to make sure that he does not get lost to followup.      PLAN:  We are going to go ahead and treat this with gentian violet which we did in clinic today.  He will followup with Mycelex and if he comes back in two to three weeks and it is still present, we are going to biopsy it.        FO/ms       Thank you very much for the opportunity to participate in the care of your patient.      Anthony Pedroza M.D.  Otolaryngology- Head & Neck Surgery  [phone]

## 2017-11-28 ENCOUNTER — OFFICE VISIT (OUTPATIENT)
Dept: OTOLARYNGOLOGY | Facility: CLINIC | Age: 47
End: 2017-11-28

## 2017-11-28 DIAGNOSIS — K14.8 TONGUE LESION: Primary | ICD-10-CM

## 2017-11-28 ASSESSMENT — PAIN SCALES - GENERAL: PAINLEVEL: NO PAIN (1)

## 2017-11-28 NOTE — PATIENT INSTRUCTIONS
Follow up will be based on the results of today.s biopsy. We will notify once that is final.  Jose Hartman RN  [phone]

## 2017-11-28 NOTE — LETTER
11/28/2017       RE: Candelario Evangelista  [address]  [address]  [place] [address]     Dear Colleague,    Thank you for referring your patient, Candelario Evangelista, to the [facility] EAR NOSE AND THROAT at [facility]. Please see a copy of my visit note below.        Again, thank you for allowing me to participate in the care of your patient.      Sincerely,    Anthony Pedroza MD

## 2017-11-28 NOTE — PROGRESS NOTES
HISTORY OF PRESENT ILLNESS:  Candelario Evangelista is 47 years of age.  He is here for follow-up today.  He had an ulcer on his tongue that we treated with Mycelex and things of this nature, but now he has a second small lesion anterior to the side of his tongue with that being healed.  He has no other current complaints at the present time today.      PHYSICAL EXAMINATION:  The patient is alert, oriented x3 and pleasant.  Skin of the face, lips, and neck on him is quite normal.  Oral cavity and oropharynx is examined.  He certainly has a leukoplakia plaque that is about 8 mm on the anterior ventrolateral tongue.  The other area is totally healed.      PROCEDURE:  After I got consent from him today, I felt that this should be biopsied and at least looked at for leukoplakia and dysplasia.  This was infiltrated with 1% lidocaine and a cupped forceps biopsy was taken of the tongue.  He tolerated this well.  This was sent for routine pathology.  There were small chips of tissue that we removed.      ASSESSMENT AND PLAN:  Patient with a history of oral cavity leukoplakia and ulcers.  I think that these are all benign.  He is a nonsmoker but is exposed to secondhand smoke at the [employer].  This should not really be causing too much of a problem I would expect, but due to some concern and the fact that he lives in [place], we will go ahead and biopsy it today which we did.

## 2017-11-28 NOTE — MR AVS SNAPSHOT
After Visit Summary   2017    Candelario Evangelista    MRN: [id number]           Patient Information     Date Of Birth          1970        Visit Information        Provider Department      2017 3:15 PM Anthony Pedroza MD [facility] Ear Nose and Thro[facility]        Today's Diagnoses     Tongue lesion    -  1      Care Instructions    Follow up will be based on the results of today.s biopsy. We will notify once that is final.  Jose Hartman RN  [phone]              Follow-ups after your visit        Who to contact     Please call your clinic at [phone] to:    Ask questions about your health    Make or cancel appointments    Discuss your medicines    Learn about your test results    Speak to your doctor   If you have compliments or concerns about an experience at your clinic, or if you wish to file a complaint, please contact [facility] Patient Relations at [phone] or email us at [email or web address]         Additional Information About Your Visit        MyChart Information     Thingies is an electronic gateway that provides easy, online access to your medical records. With Thingies, you can request a clinic appointment, read your test results, renew a prescription or communicate with your care team.     To sign up for Thingies visit the website at [email or web address]   You will be asked to enter the access code listed below, as well as some personal information. Please follow the directions to create your username and password.     Your access code is: [email or web address]  Expires: 2017  1:16 PM     Your access code will  in 90 days. If you need help or a new code, please contact your [facility] or call [phone] for assistance.        Care EveryWhere ID     This is your Care EveryWhere ID. This could be used by other organizations to access your [facility] medical records  [id number]          Blood Pressure from Last 3 Encounters:   09/28/17 119/86   02/02/17 138/78   12/22/16 (!) 142/100    Weight from Last 3 Encounters:   10/31/17 85.3 kg (188 lb)   09/28/17 83.9 kg (185 lb)   02/02/17 86.6 kg (191 lb)              We Performed the Following     BIOPSY TONGUE, ANTERIOR 2/3     Surgical pathology exam        Primary Care Provider Office Phone # Fax #    Norma Servin [phone] [address] [facility] [address]        Equal Access to Services     [facility]: Hadii gregory rivers hadjanis Soalisia, waaxda kelsieadaha, qaybta kaalmada alejandra, frida thrasher . So [facility] [place] [phone].    ATENCIÓN: Si habla español, tiene a duarte disposición servicios gratuitos de asistencia lingüística. [facility] [phone].    We comply with applicable federal civil rights laws and [place] laws. We do not discriminate on the basis of race, color, national origin, age, disability, sex, sexual orientation, or gender identity.            Thank you!     Thank you for choosing [facility] EAR NOSE AND THROAT  for your care. Our goal is always to provide you with excellent care. Hearing back from our patients is one way we can continue to improve our services. Please take a few minutes to complete the written survey that you may receive in the mail after your visit with us. Thank you!             Your Updated Medication List - Protect others around you: Learn how to safely use, store and throw away your medicines at www.disposemymeds.org.          This list is accurate as of: 11/28/17 11:59 PM.  Always use your most recent med list.                   Brand Name Dispense Instructions for use Diagnosis    ibuprofen 200 MG tablet    ADVIL/MOTRIN     Take 1-2 tablets (200-400 mg) by mouth every 8 hours as needed for mild pain        venlafaxine 75 MG 24 hr capsule    EFFEXOR-XR    90 capsule    TAKE THREE CAPSULES BY MOUTH EVERY DAY    Recurrent major depressive disorder, in  partial remission (H)

## 2017-12-01 LAB — COPATH REPORT: NORMAL

## 2017-12-08 ENCOUNTER — CARE COORDINATION (OUTPATIENT)
Dept: OTOLARYNGOLOGY | Facility: CLINIC | Age: 47
End: 2017-12-08

## 2017-12-08 NOTE — PROGRESS NOTES
Tongue biopsy done 11-28-17 .TONGUE LESION, BIOPSY:   - Benign squamous mucosa with hyperkeratosis and focal acute   inflammation   - GMS stain is negative for fungal organisms   - Negative for dysplasia or malignancy   Above message left on pt voice mail. Call for any questions.  Vale Gtz RN  ENT Care Coordinator   Otology  [phone]  12/8/2017 1:24 PM

## 2018-05-31 ENCOUNTER — OFFICE VISIT (OUTPATIENT)
Dept: FAMILY MEDICINE | Facility: CLINIC | Age: 48
End: 2018-05-31
Payer: COMMERCIAL

## 2018-05-31 VITALS
BODY MASS INDEX: 30.68 KG/M2 | TEMPERATURE: 98.3 F | HEART RATE: 117 BPM | OXYGEN SATURATION: 96 % | SYSTOLIC BLOOD PRESSURE: 144 MMHG | DIASTOLIC BLOOD PRESSURE: 99 MMHG | RESPIRATION RATE: 18 BRPM | WEIGHT: 195.5 LBS

## 2018-05-31 DIAGNOSIS — F33.1 MODERATE RECURRENT MAJOR DEPRESSION (H): ICD-10-CM

## 2018-05-31 DIAGNOSIS — G47.33 OBSTRUCTIVE SLEEP APNEA SYNDROME: ICD-10-CM

## 2018-05-31 DIAGNOSIS — R06.83 SNORING: Primary | ICD-10-CM

## 2018-05-31 DIAGNOSIS — I10 HYPERTENSION GOAL BP (BLOOD PRESSURE) < 140/90: ICD-10-CM

## 2018-05-31 DIAGNOSIS — F81.9 COGNITIVE DEVELOPMENTAL DELAY: ICD-10-CM

## 2018-05-31 PROCEDURE — 99214 OFFICE O/P EST MOD 30 MIN: CPT | Performed by: FAMILY MEDICINE

## 2018-05-31 ASSESSMENT — ANXIETY QUESTIONNAIRES
2. NOT BEING ABLE TO STOP OR CONTROL WORRYING: NOT AT ALL
1. FEELING NERVOUS, ANXIOUS, OR ON EDGE: NOT AT ALL
GAD7 TOTAL SCORE: 0
3. WORRYING TOO MUCH ABOUT DIFFERENT THINGS: NOT AT ALL
7. FEELING AFRAID AS IF SOMETHING AWFUL MIGHT HAPPEN: NOT AT ALL
6. BECOMING EASILY ANNOYED OR IRRITABLE: NOT AT ALL
5. BEING SO RESTLESS THAT IT IS HARD TO SIT STILL: NOT AT ALL
IF YOU CHECKED OFF ANY PROBLEMS ON THIS QUESTIONNAIRE, HOW DIFFICULT HAVE THESE PROBLEMS MADE IT FOR YOU TO DO YOUR WORK, TAKE CARE OF THINGS AT HOME, OR GET ALONG WITH OTHER PEOPLE: NOT DIFFICULT AT ALL

## 2018-05-31 ASSESSMENT — PATIENT HEALTH QUESTIONNAIRE - PHQ9: 5. POOR APPETITE OR OVEREATING: NOT AT ALL

## 2018-05-31 NOTE — PROGRESS NOTES
SUBJECTIVE:   Candelario Evangelista is a 48 year old male who presents to clinic today for the following health issues:      Concern - sleeping problems    Onset:  Girlfriend just noticed it yesterday ; told him that he stopped breathing for one minute while he was fast asleep, he didn't wake up.     Description:    not breathing for 1 min MAX    Intensity: mild, moderate    Progression of Symptoms:  worsening    Accompanying Signs & Symptoms:  Snoring; has snored for years (decades)    Previous history of similar problem: snoring for years    History of asthma but no symptoms recently    No recent cough or cold symptoms    Therapies Tried and outcome: breathe right strips     Hypertension Follow-up  BP Readings from Last 3 Encounters:   05/31/18 (!) 144/99   09/28/17 119/86   02/02/17 138/78        Outpatient blood pressures are not being checked.    Low Salt Diet: not monitoring salt    Depression Followup    Status since last visit: Improved back with girlfriend    See PHQ-9 for current symptoms.  Other associated symptoms: None    Complicating factors:   Significant life event:  No   Current substance abuse:  None  Anxiety or Panic symptoms:  No    PHQ-9 12/22/2016 9/28/2017 5/31/2018   Total Score 7 5 1   Q9: Suicide Ideation Several days Not at all Not at all     Wt Readings from Last 4 Encounters:   05/31/18 195 lb 8 oz (88.7 kg)   10/31/17 188 lb (85.3 kg)   09/28/17 185 lb (83.9 kg)   02/02/17 191 lb (86.6 kg)       PHQ-9  English  PHQ-9   Any Language  Suicide Assessment Five-step Evaluation and Treatment (SAFE-T)    Problem list and histories reviewed & adjusted, as indicated.  Additional history: as documented    Patient Active Problem List   Diagnosis     Moderate recurrent major depression (H)     Generalized anxiety disorder     Internal hemorrhoids     Blood in stool     CARDIOVASCULAR SCREENING; LDL GOAL LESS THAN 160     Hypertension goal BP (blood pressure) < 140/90     Intermittent asthma      Overweight (BMI 25.0-29.9)     MRSA (methicillin resistant staph aureus) culture positive     Lump of axilla     Weight loss     Diverticulosis of large intestine     External hemorrhoids     Abdominal pain, generalized     Intermittent asthma, uncomplicated     Oral aphthae     Poor dentition     Tongue lesion     Snoring     Obstructive sleep apnea syndrome     Past Surgical History:   Procedure Laterality Date     HERNIA REPAIR, INGUINAL RT/LT       HERNIA REPAIR, INGUINAL RT/LT         Social History   Substance Use Topics     Smoking status: Never Smoker     Smokeless tobacco: Never Used     Alcohol use No     Family History   Problem Relation Age of Onset     C.A.D. Mother      Hypertension Mother      ?     HEART DISEASE Mother      MENTAL ILLNESS Mother      Depression Mother      C.A.D. Father      DIABETES Father      CEREBROVASCULAR DISEASE Father      Hypertension Father      ?     Cancer - colorectal Father          Current Outpatient Prescriptions   Medication Sig Dispense Refill     ibuprofen (ADVIL,MOTRIN) 200 MG tablet Take 1-2 tablets (200-400 mg) by mouth every 8 hours as needed for mild pain       venlafaxine (EFFEXOR-XR) 75 MG 24 hr capsule TAKE THREE CAPSULES BY MOUTH EVERY DAY 90 capsule 11     Allergies   Allergen Reactions     Latex Rash     Recent Labs   Lab Test  02/02/17   1141  12/02/13   1534  11/09/11   0742  11/07/11   1615   LDL   --   103   --    --    HDL   --   36*   --    --    TRIG   --   252*   --    --    ALT  26  40   --   36   CR  0.94  0.86   --   0.89   GFRESTIMATED  86  >90   --   >90   GFRESTBLACK  >90   GFR Calc    >90   --   >90   POTASSIUM  3.8  4.3   --   3.9   TSH   --    --   1.16   --       BP Readings from Last 3 Encounters:   05/31/18 (!) 144/99   09/28/17 119/86   02/02/17 138/78    Wt Readings from Last 3 Encounters:   05/31/18 195 lb 8 oz (88.7 kg)   10/31/17 188 lb (85.3 kg)   09/28/17 185 lb (83.9 kg)                    Reviewed and updated  as needed this visit by clinical staff  Tobacco  Allergies  Meds  Problems  Med Hx  Surg Hx  Fam Hx  Soc Hx        Reviewed and updated as needed this visit by Provider  Allergies  Meds  Problems         ROS:  Constitutional, HEENT, cardiovascular, pulmonary, GI, , musculoskeletal, neuro, skin, endocrine and psych systems are negative, except as otherwise noted.    OBJECTIVE:     BP (!) 144/99 (BP Location: Right arm, Patient Position: Chair, Cuff Size: Adult Large)  Pulse 117  Temp 98.3  F (36.8  C) (Oral)  Resp 18  Wt 195 lb 8 oz (88.7 kg)  SpO2 96%  BMI 30.68 kg/m2  Body mass index is 30.68 kg/(m^2).  GENERAL: healthy, alert and no distress  EYES: Eyes grossly normal to inspection, PERRL and conjunctivae and sclerae normal  HENT: ear canals and TM's normal, nose and mouth without ulcers or lesions  NECK: no adenopathy, no asymmetry, masses, or scars and thyroid normal to palpation  RESP: lungs clear to auscultation - no rales, rhonchi or wheezes  CV: regular rate and rhythm, normal S1 S2, no S3 or S4, no murmur, click or rub, no peripheral edema and peripheral pulses strong  ABDOMEN: soft, nontender, no hepatosplenomegaly, no masses and bowel sounds normal  MS: no gross musculoskeletal defects noted, no edema  SKIN: no suspicious lesions or rashes  NEURO: Normal strength and tone, mentation intact and speech normal  PSYCH: affect cheerful, mood appropriate, cognition slowed, normal for him    Diagnostic Test Results:  No results found for this or any previous visit (from the past 24 hour(s)).    ASSESSMENT/PLAN:         1. Snoring  Long term with suspected  2. Obstructive sleep apnea syndrome  This would be a new diagnosis for Candelario.  referreal to sleep center for futher evaluation, testing as appropriate  Screen for metabolic disorders (thyroid, diabetes). Will fu as indicated.   - SLEEP EVALUATION & MANAGEMENT REFERRAL - Replaced by [facility]  [phone] (Age 18 and up);  Future  - TSH with free T4 reflex  - CBC with platelets differential  - Hemoglobin A1c  - SLEEP EVALUATION & MANAGEMENT REFERRAL - ADULT -[facility] [phone]  (Age 18 and up); Future    3. Hypertension goal BP (blood pressure) < 140/90  BP Readings from Last 3 Encounters:   05/31/18 (!) 144/99   09/28/17 119/86   02/02/17 138/78    elevated today, with prior hx of severe symptomatic hypotension on medication, although he may have accidentally taken too much of his prescribed medication  I did recommend starting medication today, he refused, he will check daily at work and let me know  - Basic metabolic panel  - Albumin Random Urine Quantitative with Creat Ratio    4. Moderate recurrent major depression (H)  PHQ-9 SCORE 12/22/2016 9/28/2017 5/31/2018   Total Score - - -   Total Score 7 5 1    in remission    5. Body mass index 30.0-30.9, adult  Wt Readings from Last 4 Encounters:   05/31/18 195 lb 8 oz (88.7 kg)   10/31/17 188 lb (85.3 kg)   09/28/17 185 lb (83.9 kg)   02/02/17 191 lb (86.6 kg)    increasing, encourage activity    6. Cognitive developmental delay  Stable, impacting understanding of medical concepts meghanny      Norma Servin MD  [facility]

## 2018-05-31 NOTE — MR AVS SNAPSHOT
After Visit Summary   5/31/2018    Candelario Evangelista    MRN: [id number]           Patient Information     Date Of Birth          1970        Visit Information        Provider Department      5/31/2018 11:20 AM Norma Servin MD [facility]        Today's Diagnoses     Snoring    -  1    Obstructive sleep apnea syndrome        Hypertension goal BP (blood pressure) < 140/90        Moderate recurrent major depression (H)           Follow-ups after your visit        Additional Services     SLEEP EVALUATION & MANAGEMENT REFERRAL - [facility]  [phone] (Age 18 and up)       Please be aware that coverage of these services is subject to the terms and limitations of your health insurance plan.  Call member services at your health plan with any benefit or coverage questions.      Please bring the following to your appointment:    >>   List of current medications   >>   This referral request   >>   Any documents/labs given to you for this referral                      Future tests that were ordered for you today     Open Future Orders        Priority Expected Expires Ordered    SLEEP EVALUATION & MANAGEMENT REFERRAL - [facility]  [phone] (Age 18 and up) Routine  5/31/2019 5/31/2018            Who to contact     If you have questions or need follow up information about today's clinic visit or your schedule please contact [facility] directly at [phone].  Normal or non-critical lab and imaging results will be communicated to you by MyChart, letter or phone within 4 business days after the clinic has received the results. If you do not hear from us within 7 days, please contact the clinic through MyChart or phone. If you have a critical or abnormal lab result, we will notify you by phone as soon as possible.  Submit refill requests through [employer] or call your pharmacy and they will forward the  refill request to us. Please allow 3 business days for your refill to be completed.          Additional Information About Your Visit        Care EveryWhere ID     This is your Care EveryWhere ID. This could be used by other organizations to access your [place] medical records  [id number]        Your Vitals Were     Pulse Temperature Respirations Pulse Oximetry BMI (Body Mass Index)       117 98.3  F (36.8  C) (Oral) 18 96% 30.68 kg/m2        Blood Pressure from Last 3 Encounters:   05/31/18 (!) 144/99   09/28/17 119/86   02/02/17 138/78    Weight from Last 3 Encounters:   05/31/18 195 lb 8 oz (88.7 kg)   10/31/17 188 lb (85.3 kg)   09/28/17 185 lb (83.9 kg)              We Performed the Following     Albumin Random Urine Quantitative with Creat Ratio     Basic metabolic panel     CBC with platelets differential     Hemoglobin A1c     TSH with free T4 reflex        Primary Care Provider Office Phone # Fax #    Norma Servin [phone] [address] [address]        Equal Access to Services     [facility] : Hadii aad ku hadasho Soalisia, waaxda luqadaha, qaybta kaalmada alejandra, frida thrasher . So [facility] [phone].    ATENCIÓN: Si habla español, tiene a duarte disposición servicios gratuitos de asistencia lingüística. Robert al [phone].    We comply with applicable federal civil rights laws and [place] laws. We do not discriminate on the basis of race, color, national origin, age, disability, sex, sexual orientation, or gender identity.            Thank you!     Thank you for choosing [facility]  for your care. Our goal is always to provide you with excellent care. Hearing back from our patients is one way we can continue to improve our services. Please take a few minutes to complete the written survey that you may receive in the mail after your visit with us. Thank you!             Your Updated Medication List - Protect  others around you: Learn how to safely use, store and throw away your medicines at www.disposemymeds.org.          This list is accurate as of 5/31/18 12:05 PM.  Always use your most recent med list.                   Brand Name Dispense Instructions for use Diagnosis    ibuprofen 200 MG tablet    ADVIL/MOTRIN     Take 1-2 tablets (200-400 mg) by mouth every 8 hours as needed for mild pain        venlafaxine 75 MG 24 hr capsule    EFFEXOR-XR    90 capsule    TAKE THREE CAPSULES BY MOUTH EVERY DAY    Recurrent major depressive disorder, in partial remission (H)

## 2018-06-01 ASSESSMENT — ANXIETY QUESTIONNAIRES: GAD7 TOTAL SCORE: 0

## 2018-06-01 ASSESSMENT — PATIENT HEALTH QUESTIONNAIRE - PHQ9: SUM OF ALL RESPONSES TO PHQ QUESTIONS 1-9: 1

## 2018-06-01 ASSESSMENT — ASTHMA QUESTIONNAIRES: ACT_TOTALSCORE: 25

## 2018-06-14 ENCOUNTER — OFFICE VISIT (OUTPATIENT)
Dept: SLEEP MEDICINE | Facility: CLINIC | Age: 48
End: 2018-06-14
Attending: FAMILY MEDICINE
Payer: COMMERCIAL

## 2018-06-14 VITALS
OXYGEN SATURATION: 97 % | RESPIRATION RATE: 16 BRPM | SYSTOLIC BLOOD PRESSURE: 124 MMHG | DIASTOLIC BLOOD PRESSURE: 87 MMHG | HEART RATE: 111 BPM | BODY MASS INDEX: 30.29 KG/M2 | HEIGHT: 67 IN | WEIGHT: 193 LBS

## 2018-06-14 DIAGNOSIS — R06.83 SNORING: ICD-10-CM

## 2018-06-14 DIAGNOSIS — I10 HYPERTENSION GOAL BP (BLOOD PRESSURE) < 140/90: ICD-10-CM

## 2018-06-14 DIAGNOSIS — G47.30 OBSERVED SLEEP APNEA: Primary | ICD-10-CM

## 2018-06-14 PROCEDURE — 99244 OFF/OP CNSLTJ NEW/EST MOD 40: CPT | Performed by: PHYSICIAN ASSISTANT

## 2018-06-14 NOTE — MR AVS SNAPSHOT
"              After Visit Summary   6/14/2018    Candelario Evangelista    MRN: [id number]           Patient Information     Date Of Birth          1970        Visit Information        Provider Department      6/14/2018 2:00 PM Goltz, Bennett Ezra, PA-C [facility]        Today's Diagnoses     Observed sleep apnea    -  1    Snoring        Hypertension goal BP (blood pressure) < 140/90          Care Instructions    MY TREATMENT INFORMATION FOR SLEEP APNEA-  Candelario Evangelista    DOCTOR : Bennett Ezra Goltz, PA-C  SLEEP CENTER : [facility]     MY CONTACT NUMBER: [phone]    Am I having a sleep study at a sleep center?  Make sure you have an appointment for the study before you leave!    Am I having a home sleep study?  Watch this video:  [email or web address]/watch?v=[email or web address]_[email or web address]  Please verify your insurance coverage with your insurance carrier    Frequently asked questions:  1. What is Obstructive Sleep Apnea (GERRY)? GERRY is the most common type of sleep apnea. Apnea means, \"without breath.\"  Apnea is most often caused by narrowing or collapse of the upper airway as muscles relax during sleep.   Almost everyone has occasional apneas. Most people with sleep apnea have had brief interruptions at night frequently for many years.  The severity of sleep apnea is related to how frequent and severe the events are.   2. What are the consequences of GERRY? Symptoms include: feeling sleepy during the day, snoring loudly, gasping or stopping of breathing, trouble sleeping, and occasionally morning headaches or heartburn at night.  Sleepiness can be serious and even increase the risk of falling asleep while driving. Other health consequences may include development of high blood pressure and other cardiovascular disease in persons who are susceptible. Untreated GERRY can contribute to heart disease, stroke and diabetes.   3. What are the treatment options? In most situations, sleep " apnea is a lifelong disease that must be managed with daily therapy. Medications are not effective for sleep apnea and surgery is generally not considered until other therapies have been tried. Your treatment is your choice. Continuous Positive Airway (CPAP) works right away and is the therapy that is effective in nearly everyone. An oral device to hold your jaw forward is usually the next most reliable option. Other options include postioning devices (to keep you off your back), weight loss, and surgery including a tongue pacing device. There is more detail about some of these options below.    Important tips for using CPAP and similar devices   Know your equipment:  CPAP is continuous positive airway pressure that prevents obstructive sleep apnea by keeping the throat from collapsing while you are sleeping. In most cases, the device is  smart  and can slowly self-adjusts if your throat collapses and keeps a record every day of how well you are treated-this information is available to you and your care team.  BPAP is bilevel positive airway pressure that keeps your throat open and also assists each breath with a pressure boost to maintain adequate breathing.  Special kinds of BPAP are used in patients who have inadequate breathing from lung or heart disease. In most cases, the device is  smart  and can slowly self-adjusts to assist breathing. Like CPAP, the device keeps a record of how well you are treated.  Your mask is your connection to the device. You get to choose what feels most comfortable and the staff will help to make sure if fits. Here: are some examples of the different masks that are available:       Key points to remember on your journey with sleep apnea:  1. Sleep study.  PAP devices often need to be adjusted during a sleep study to show that they are effective and adjusted right.  2. Good tips to remember: Try wearing just the mask during a quiet time during the day so your body adapts to wearing  it. A humidifier is recommended for comfort in most cases to prevent drying of your nose and throat. Allergy medication from your provider may help you if you are having nasal congestion.  3. Getting settled-in. It takes more than one night for most of us to get used to wearing a mask. Try wearing just the mask during a quiet time during the day so your body adapts to wearing it. A humidifier is recommended for comfort in most cases. Our team will work with you carefully on the first day and will be in contact within 4 days and again at 2 and 4 weeks for advice and remote device adjustments. Your therapy is evaluated by the device each day.   4. Use it every night. The more you are able to sleep naturally for 7-8 hours, the more likely you will have good sleep and to prevent health risks or symptoms from sleep apnea. Even if you use it 4 hours it helps. Occasionally all of us are unable to use a medical therapy, in sleep apnea, it is not dangerous to miss one night.   5. Communicate. Call our skilled team on the number provided on the first day if your visit for problems that make it difficult to wear the device. Over 2 out of 3 patients can learn to wear the device long-term with help from our team. Remember to call our team or your sleep providers if you are unable to wear the device as we may have other solutions for those who cannot adapt to mask CPAP therapy. It is recommended that you sleep your sleep provider within the first 3 months and yearly after that if you are not having problems.   Take care of your equipment. Make sure you clean your mask and tubing using directions every day and that your filter and mask are replaced as recommended or if they are not working.     BESIDES CPAP, WHAT OTHER THERAPIES ARE THERE?    Positioning Device  Positioning devices are generally used when sleep apnea is mild and only occurs on your back.This example shows a pillow that straps around the waist. It may be  appropriate for those whose sleep study shows milder sleep apnea that occurs primarily when lying flat on one's back. Preliminary studies have shown benefit but effectiveness at home may need to be verified by a home sleep test. These devices are generally not covered by medical insurance.  Examples of devices that maintain sleeping on the back to prevent snoring and mild sleep apnea.    Belt type body positioner  Http://[employer].com/    Electronic reminder  Http://nightshifttherapy.com/  Http://www.LUMOback.Brightfish.au/    Oral Appliance  What is oral appliance therapy?  An oral appliance device fits on your teeth at night like a retainer used after having braces. The device is made by a specialized dentist and requires several visits over 1-2 months before a manufactured device is made to fit your teeth and is adjusted to prevent your sleep apnea. Once an oral device is working properly, snoring should be improved. A home sleep test may be recommended at that time if to determine whether the sleep apnea is adequately treated.       Some things to remember:  -Oral devices are often, but not always, covered by your medical insurance. Be sure to check with your insurance provider.   -If you are referred for oral therapy, you will be given a list of specialized dentists to consider or you may choose to visit the Web site of the American Academy of Dental Sleep Medicine  -Oral devices are less likely to work if you have severe sleep apnea or are extremely overweight.     More detailed information  An oral appliance is a small acrylic device that fits over the upper and lower teeth  (similar to a retainer or a mouth guard). This device slightly moves jaw forward, which moves the base of the tongue forward, opens the airway, improves breathing for effective treat snoring and obstructive sleep apnea in perhaps 7 out of 10 people .  The best working devices are custom-made by a dental device  after a mold is made of  the teeth 1, 2, 3.  When is an oral appliance indicated?  Oral appliance therapy is recommended as a first-line treatment for patients with primary snoring, mild sleep apnea, and for patients with moderate sleep apnea who prefer appliance therapy to use of CPAP4, 5. Severity of sleep apnea is determined by sleep testing and is based on the number of respiratory events per hour of sleep.   How successful is oral appliance therapy?  The success rate of oral appliance therapy in patients with mild sleep apnea is 75-80% while in patients with moderate sleep apnea it is 50-70%. The chance of success in patients with severe sleep apnea is 40-50%. The research also shows that oral appliances have a beneficial effect on the cardiovascular health of GERRY patients at the same magnitude as CPAP therapy7.  Oral appliances should be a second-line treatment in cases of severe sleep apnea, but if not completely successful then a combination therapy utilizing CPAP plus oral appliance therapy may be effective. Oral appliances tend to be effective in a broad range of patients although studies show that the patients who have the highest success are females, younger patients, those with milder disease, and less severe obesity. 3, 6.   Finding a dentist that practices dental sleep medicine  Specific training is available through the American Academy of Dental Sleep Medicine for dentists interested in working in the field of sleep. To find a dentist who is educated in the field of sleep and the use of oral appliances, near you, visit the Web site of the American Academy of Dental Sleep Medicine.    References  1. Swati, et al. Objectively measured vs self-reported compliance during oral appliance therapy for sleep-disordered breathing. Chest 2013; 144(5): 0079-3410.  2. Tony et al. Objective measurement of compliance during oral appliance therapy for sleep-disordered breathing. Thorax 2013; 68(1): 91-96.  3. Jamey et al.  Mandibular advancement devices in 620 men and women with GERRY and snoring: tolerability and predictors of treatment success. Chest 2004; 125: 7244-0176.  4. Yennifer et al. Oral appliances for snoring and GERRY: a review. Sleep 2006; 29: 244-262.  5. Subhash et al. Oral appliance treatment for GERRY: an update. J Clin Sleep Med 2014; 10(2): 215-227.  6. Katie et al. Predictors of OSAH treatment outcome. J Dent Res 2007; 86: 6131-7643.    Weight Loss:    Weight loss is a long-term strategy that may improve sleep apnea in some patients.    Weight management is a personal decision and the decision should be based on your interest and the potential benefits.  If you are interested in exploring weight loss strategies, the following discussion covers the impact on weight loss on sleep apnea and the approaches that may be successful.    Being overweight does not necessarily mean you will have health consequences.  Those who have BMI over 35 or over 27 with existing medical conditions carries greater risk.   Weight loss decreases severity of sleep apnea in most people with obesity. For those with mild obesity who have developed snoring with weight gain, even 15-30 pound weight loss can improve and occasionally eliminate sleep apnea.  Structured and life-long dietary and health habits are necessary to lose weight and keep healthier weight levels.     Though there may be significant health benefits from weight loss, long-term weight loss is very difficult to achieve- studies show success with dietary management in less than 10% of people. In addition, substantial weight loss may require years of dietary control and may be difficult if patients have severe obesity. In these cases, surgical management may be considered.  Finally, older individuals who have tolerated obesity without health complications may be less likely to benefit from weight loss strategies.      Your BMI is Body mass index is 30.23 kg/(m^2).  Weight  management is a personal decision.  If you are interested in exploring weight loss strategies, the following discussion covers the approaches that may be successful. Body mass index (BMI) is one way to tell whether you are at a healthy weight, overweight, or obese. It measures your weight in relation to your height.  A BMI of 18.5 to 24.9 is in the healthy range. A person with a BMI of 25 to 29.9 is considered overweight, and someone with a BMI of 30 or greater is considered obese. More than two-thirds of American adults are considered overweight or obese.  Being overweight or obese increases the risk for further weight gain. Excess weight may lead to heart disease and diabetes.  Creating and following plans for healthy eating and physical activity may help you improve your health.  Weight control is part of healthy lifestyle and includes exercise, emotional health, and healthy eating habits. Careful eating habits lifelong are the mainstay of weight control. Though there are significant health benefits from weight loss, long-term weight loss with diet alone may be very difficult to achieve- studies show long-term success with dietary management in less than 10% of people. Attaining a healthy weight may be especially difficult to achieve in those with severe obesity. In some cases, medications, devices and surgical management might be considered.  What can you do?  If you are overweight or obese and are interested in methods for weight loss, you should discuss this with your provider.     Consider reducing daily calorie intake by 500 calories.     Keep a food journal.     Avoiding skipping meals, consider cutting portions instead.    Diet combined with exercise helps maintain muscle while optimizing fat loss. Strength training is particularly important for building and maintaining muscle mass. Exercise helps reduce stress, increase energy, and improves fitness. Increasing exercise without diet control, however, may  not burn enough calories to loose weight.       Start walking three days a week 10-20 minutes at a time    Work towards walking thirty minutes five days a week     Eventually, increase the speed of your walking for 1-2 minutes at time    In addition, we recommend that you review healthy lifestyles and methods for weight loss available through the National Institutes of Health patient information sites:  http://win.niddk.nih.gov/publications/index.htm    And look into health and wellness programs that may be available through your health insurance provider, employer, local community center, or imtiaz club.    Weight management plan: Patient was referred to their PCP to discuss a diet and exercise plan.    Surgery:    Surgery for obstructive sleep apnea is considered generally only when other therapies fail to work. Surgery may be discussed with you if you are having a difficult time tolerating CPAP and or when there is an abnormal structure that requires surgical correction.  Nose and throat surgeries often enlarge the airway to prevent collapse.  Most of these surgeries create pain for 1-2 weeks and up to half of the most common surgeries are not effective throughout life.  You should carefully discuss the benefits and drawbacks to surgery with your sleep provider and surgeon to determine if it is the best solution for you.   More information  Surgery for GERRY is directed at areas that are responsible for narrowing or complete obstruction of the airway during sleep.  There are a wide range of procedures available to enlarge and/or stabilize the airway to prevent blockage of breathing in the three major areas where it can occur: the palate, tongue, and nasal regions.  Successful surgical treatment depends on the accurate identification of the factors responsible for obstructive sleep apnea in each person.  A personalized approach is required because there is no single treatment that works well for everyone.  Because of  anatomic variation, consultation with an examination by a sleep surgeon is a critical first step in determining what surgical options are best for each patient.  In some cases, examination during sedation may be recommended in order to guide the selection of procedures.  Patients will be counseled about risks and benefits as well as the typical recovery course after surgery. Surgery is typically not a cure for a person s GERRY.  However, surgery will often significantly improve one s GERRY severity (termed  success rate ).  Even in the absence of a cure, surgery will decrease the cardiovascular risk associated with OSA7; improve overall quality of life8 (sleepiness, functionality, sleep quality, etc).  Palate Procedures:  Patients with GERRY often have narrowing of their airway in the region of their tonsils and uvula.  The goals of palate procedures are to widen the airway in this region as well as to help the tissues resist collapse.  Modern palate procedure techniques focus on tissue conservation and soft tissue rearrangement, rather than tissue removal.  Often the uvula is preserved in this procedure. Residual sleep apnea is common in patient after pharyngoplasty with an average reduction in sleep apnea events of 33%2.    Tongue Procedures:  ExamWhile patients are awake, the muscles that surround the throat are active and keep this region open for breathing. These muscles relax during sleep, allowing the tongue and other structures to collapse and block breathing.  There are several different tongue procedures available.  Selection of a tongue base procedure depends on characteristics seen on physical exam.  Generally, procedures are aimed at removing bulky tissues in this area or preventing the back of the tongue from falling back during sleep.  Success rates for tongue surgery range from 50-62%3.  Hypoglossal Nerve Stimulation:  Hypoglossal nerve stimulation has recently received approval from the [place] Food  and Drug Administration for the treatment of obstructive sleep apnea.  This is based on research showing that the system was safe and effective in treating sleep apnea6.  Results showed that the median AHI score decreased 68%, from 29.3 to 9.0. This therapy uses an implant system that senses breathing patterns and delivers mild stimulation to airway muscles, which keeps the airway open during sleep.  The system consists of three fully implanted components: a small generator (similar in size to a pacemaker), a breathing sensor, and a stimulation lead.  Using a small handheld remote, a patient turns the therapy on before bed and off upon awakening.    Candidates for this device must be greater than 22 years of age, have moderate to severe GERRY (AHI between 20-65), BMI less than 32, have tried CPAP/oral appliance without success, and have appropriate upper airway anatomy (determined by a sleep endoscopy performed by Dr. Avlia).  Hypoglossal Nerve Stimulation Pathway:    The sleep surgeon s office will work with the patient through the insurance prior-authorization process (including communications and appeals).    Nasal Procedures:  Nasal obstruction can interfere with nasal breathing during the day and night.  Studies have shown that relief of nasal obstruction can improve the ability of some patients to tolerate positive airway pressure therapy for obstructive sleep apnea1.  Treatment options include medications such as nasal saline, topical corticosteroid and antihistamine sprays, and oral medications such as antihistamines or decongestants. Non-surgical treatments can include external nasal dilators for selected patients. If these are not successful by themselves, surgery can improve the nasal airway either alone or in combination with these other options.  Combination Procedures:  Combination of surgical procedures and other treatments may be recommended, particularly if patients have more than one area of narrowing  or persistent positional disease.  The success rate of combination surgery ranges from 66-80%2,3.    References  1. Gary MARTINEZ. The Role of the Nose in Snoring and Obstructive Sleep Apnoea: An Update.  Eur Arch Otorhinolaryngol. 2011; 268: 1365-73.  2.  Lilliana SM; Caitlin JA; Davy JR; Pallanch JF; Jalen MB; Inder SG; Shayne ROSARIO. Surgical modifications of the upper airway for obstructive sleep apnea in adults: a systematic review and meta-analysis. SLEEP 2010;33(10):2775-6910. Titus GALVIN. Hypopharyngeal surgery in obstructive sleep apnea: an evidence-based medicine review.  Arch Otolaryngol Head Neck Surg. 2006 Feb;132(2):206-13.  3. Alexander YH1, Delon Y, Dominick ANABELLE. The efficacy of anatomically based multilevel surgery for obstructive sleep apnea. Otolaryngol Head Neck Surg. 2003 Oct;129(4):327-35.  4. Titus GALVIN, Goldberg A. Hypopharyngeal Surgery in Obstructive Sleep Apnea: An Evidence-Based Medicine Review. Arch Otolaryngol Head Neck Surg. 2006 Feb;132(2):206-13.  5. Miguelo PJ et al. Upper-Airway Stimulation for Obstructive Sleep Apnea.  N Engl J Med. 2014 Jan 9;370(2):139-49.  6. Kei Y et al. Increased Incidence of Cardiovascular Disease in Middle-aged Men with Obstructive Sleep Apnea. Am J Respir Crit Care Med; 2002 166: 159-165  7. Tanmay EM et al. Studying Life Effects and Effectiveness of Palatopharyngoplasty (SLEEP) study: Subjective Outcomes of Isolated Uvulopalatopharyngoplasty. Otolaryngol Head Neck Surg. 2011; 144: 623-631.            Follow-ups after your visit        Follow-up notes from your care team     Return in about 2 weeks (around 6/28/2018) for Sleep Study Review.      Your next 10 appointments already scheduled     Jun 27, 2018  8:30 PM CDT   PSG Split with BED 3  SLEEP   [facility] ([facility])    [address] [id number] [phone]            Jul 11, 2018 10:00 AM CDT   Return Sleep Patient with Chetan Ibanez  "Goltz, PA-C   [facility] ([facility])    [address] [address] [phone]              Future tests that were ordered for you today     Open Future Orders        Priority Expected Expires Ordered    Comprehensive Sleep Study Routine  12/11/2018 6/14/2018            Who to contact     If you have questions or need follow up information about today's clinic visit or your schedule please contact [facility] directly at [phone].  Normal or non-critical lab and imaging results will be communicated to you by MyChart, letter or phone within 4 business days after the clinic has received the results. If you do not hear from us within 7 days, please contact the clinic through MyChart or phone. If you have a critical or abnormal lab result, we will notify you by phone as soon as possible.  Submit refill requests through [facility] or call your pharmacy and they will forward the refill request to us. Please allow 3 business days for your refill to be completed.          Additional Information About Your Visit        Care EveryWhere ID     This is your Care EveryWhere ID. This could be used by other organizations to access your [facility] medical records  [id number]        Your Vitals Were     Pulse Respirations Height Pulse Oximetry BMI (Body Mass Index)       111 16 1.702 m (5' 7\") 97% 30.23 kg/m2        Blood Pressure from Last 3 Encounters:   06/14/18 124/87   05/31/18 (!) 144/99   09/28/17 119/86    Weight from Last 3 Encounters:   06/14/18 87.5 kg (193 lb)   05/31/18 88.7 kg (195 lb 8 oz)   10/31/17 85.3 kg (188 lb)              We Performed the Following     SLEEP EVALUATION & MANAGEMENT REFERRAL - ADULT -[facility]  [phone] (Age 18 and up)        Primary Care Provider Office Phone # Fax #    Norma Servin [phone] [address]  [facility] [address]      "   Equal Access to Services     LYUBOV STANFORD : Hadii aad ku hadgilbertjace Albarran, wagregoryda kelsiechristinaha, qaellijosh graydeefrida jiménez. So [facility] [phone].    ATENCIÓN: Si habla español, tiene a duarte disposición servicios gratuitos de asistencia lingüística. Llame al [phone].    We comply with applicable federal civil rights laws and [place] laws. We do not discriminate on the basis of race, color, national origin, age, disability, sex, sexual orientation, or gender identity.            Thank you!     Thank you for choosing [facility]  for your care. Our goal is always to provide you with excellent care. Hearing back from our patients is one way we can continue to improve our services. Please take a few minutes to complete the written survey that you may receive in the mail after your visit with us. Thank you!             Your Updated Medication List - Protect others around you: Learn how to safely use, store and throw away your medicines at www.disposemymeds.org.          This list is accurate as of 6/14/18  3:45 PM.  Always use your most recent med list.                   Brand Name Dispense Instructions for use Diagnosis    ibuprofen 200 MG tablet    ADVIL/MOTRIN     Take 1-2 tablets (200-400 mg) by mouth every 8 hours as needed for mild pain        venlafaxine 75 MG 24 hr capsule    EFFEXOR-XR    90 capsule    TAKE THREE CAPSULES BY MOUTH EVERY DAY    Recurrent major depressive disorder, in partial remission (H)

## 2018-06-14 NOTE — NURSING NOTE
"Chief Complaint   Patient presents with     Sleep Problem     \"Told I stop breathing when I was sleeping - up to a minute long.\"       Initial /87  Pulse 111  Resp 16  Ht 1.702 m (5' 7\")  Wt 87.5 kg (193 lb)  SpO2 97%  BMI 30.23 kg/m2 Estimated body mass index is 30.23 kg/(m^2) as calculated from the following:    Height as of this encounter: 1.702 m (5' 7\").    Weight as of this encounter: 87.5 kg (193 lb).    Medication Reconciliation: complete     ESS   Neck 43cm  Raiza Hooks        "

## 2018-06-14 NOTE — PATIENT INSTRUCTIONS
"MY TREATMENT INFORMATION FOR SLEEP APNEA-  Candelario Evangelista    DOCTOR : Bennett Ezra Goltz, PA-C  SLEEP CENTER : [facility]     MY CONTACT NUMBER: [phone]    Am I having a sleep study at a sleep center?  Make sure you have an appointment for the study before you leave!    Am I having a home sleep study?  Watch this video:  [email or web address]/watch?[email or web address]  Please verify your insurance coverage with your insurance carrier    Frequently asked questions:  1. What is Obstructive Sleep Apnea (GERRY)? GERRY is the most common type of sleep apnea. Apnea means, \"without breath.\"  Apnea is most often caused by narrowing or collapse of the upper airway as muscles relax during sleep.   Almost everyone has occasional apneas. Most people with sleep apnea have had brief interruptions at night frequently for many years.  The severity of sleep apnea is related to how frequent and severe the events are.   2. What are the consequences of GERRY? Symptoms include: feeling sleepy during the day, snoring loudly, gasping or stopping of breathing, trouble sleeping, and occasionally morning headaches or heartburn at night.  Sleepiness can be serious and even increase the risk of falling asleep while driving. Other health consequences may include development of high blood pressure and other cardiovascular disease in persons who are susceptible. Untreated GERRY can contribute to heart disease, stroke and diabetes.   3. What are the treatment options? In most situations, sleep apnea is a lifelong disease that must be managed with daily therapy. Medications are not effective for sleep apnea and surgery is generally not considered until other therapies have been tried. Your treatment is your choice. Continuous Positive Airway (CPAP) works right away and is the therapy that is effective in nearly everyone. An oral device to hold your jaw forward is usually the next most reliable option. Other options " include postioning devices (to keep you off your back), weight loss, and surgery including a tongue pacing device. There is more detail about some of these options below.    Important tips for using CPAP and similar devices   Know your equipment:  CPAP is continuous positive airway pressure that prevents obstructive sleep apnea by keeping the throat from collapsing while you are sleeping. In most cases, the device is  smart  and can slowly self-adjusts if your throat collapses and keeps a record every day of how well you are treated-this information is available to you and your care team.  BPAP is bilevel positive airway pressure that keeps your throat open and also assists each breath with a pressure boost to maintain adequate breathing.  Special kinds of BPAP are used in patients who have inadequate breathing from lung or heart disease. In most cases, the device is  smart  and can slowly self-adjusts to assist breathing. Like CPAP, the device keeps a record of how well you are treated.  Your mask is your connection to the device. You get to choose what feels most comfortable and the staff will help to make sure if fits. Here: are some examples of the different masks that are available:       Key points to remember on your journey with sleep apnea:  1. Sleep study.  PAP devices often need to be adjusted during a sleep study to show that they are effective and adjusted right.  2. Good tips to remember: Try wearing just the mask during a quiet time during the day so your body adapts to wearing it. A humidifier is recommended for comfort in most cases to prevent drying of your nose and throat. Allergy medication from your provider may help you if you are having nasal congestion.  3. Getting settled-in. It takes more than one night for most of us to get used to wearing a mask. Try wearing just the mask during a quiet time during the day so your body adapts to wearing it. A humidifier is recommended for comfort in most  cases. Our team will work with you carefully on the first day and will be in contact within 4 days and again at 2 and 4 weeks for advice and remote device adjustments. Your therapy is evaluated by the device each day.   4. Use it every night. The more you are able to sleep naturally for 7-8 hours, the more likely you will have good sleep and to prevent health risks or symptoms from sleep apnea. Even if you use it 4 hours it helps. Occasionally all of us are unable to use a medical therapy, in sleep apnea, it is not dangerous to miss one night.   5. Communicate. Call our skilled team on the number provided on the first day if your visit for problems that make it difficult to wear the device. Over 2 out of 3 patients can learn to wear the device long-term with help from our team. Remember to call our team or your sleep providers if you are unable to wear the device as we may have other solutions for those who cannot adapt to mask CPAP therapy. It is recommended that you sleep your sleep provider within the first 3 months and yearly after that if you are not having problems.   Take care of your equipment. Make sure you clean your mask and tubing using directions every day and that your filter and mask are replaced as recommended or if they are not working.     BESIDES CPAP, WHAT OTHER THERAPIES ARE THERE?    Positioning Device  Positioning devices are generally used when sleep apnea is mild and only occurs on your back.This example shows a pillow that straps around the waist. It may be appropriate for those whose sleep study shows milder sleep apnea that occurs primarily when lying flat on one's back. Preliminary studies have shown benefit but effectiveness at home may need to be verified by a home sleep test. These devices are generally not covered by medical insurance.  Examples of devices that maintain sleeping on the back to prevent snoring and mild sleep apnea.    Belt type body  positioner  [email or web address]    Electronic reminder  Http://nightshifttherapy.com/  Http://www.Lover.ly.com.au/    Oral Appliance  What is oral appliance therapy?  An oral appliance device fits on your teeth at night like a retainer used after having braces. The device is made by a specialized dentist and requires several visits over 1-2 months before a manufactured device is made to fit your teeth and is adjusted to prevent your sleep apnea. Once an oral device is working properly, snoring should be improved. A home sleep test may be recommended at that time if to determine whether the sleep apnea is adequately treated.       Some things to remember:  -Oral devices are often, but not always, covered by your medical insurance. Be sure to check with your insurance provider.   -If you are referred for oral therapy, you will be given a list of specialized dentists to consider or you may choose to visit the Web site of the American Academy of Dental Sleep Medicine  -Oral devices are less likely to work if you have severe sleep apnea or are extremely overweight.     More detailed information  An oral appliance is a small acrylic device that fits over the upper and lower teeth  (similar to a retainer or a mouth guard). This device slightly moves jaw forward, which moves the base of the tongue forward, opens the airway, improves breathing for effective treat snoring and obstructive sleep apnea in perhaps 7 out of 10 people .  The best working devices are custom-made by a dental device  after a mold is made of the teeth 1, 2, 3.  When is an oral appliance indicated?  Oral appliance therapy is recommended as a first-line treatment for patients with primary snoring, mild sleep apnea, and for patients with moderate sleep apnea who prefer appliance therapy to use of CPAP4, 5. Severity of sleep apnea is determined by sleep testing and is based on the number of respiratory events per hour of sleep.   How successful  is oral appliance therapy?  The success rate of oral appliance therapy in patients with mild sleep apnea is 75-80% while in patients with moderate sleep apnea it is 50-70%. The chance of success in patients with severe sleep apnea is 40-50%. The research also shows that oral appliances have a beneficial effect on the cardiovascular health of GERRY patients at the same magnitude as CPAP therapy7.  Oral appliances should be a second-line treatment in cases of severe sleep apnea, but if not completely successful then a combination therapy utilizing CPAP plus oral appliance therapy may be effective. Oral appliances tend to be effective in a broad range of patients although studies show that the patients who have the highest success are females, younger patients, those with milder disease, and less severe obesity. 3, 6.   Finding a dentist that practices dental sleep medicine  Specific training is available through the American Academy of Dental Sleep Medicine for dentists interested in working in the field of sleep. To find a dentist who is educated in the field of sleep and the use of oral appliances, near you, visit the Web site of the American Academy of Dental Sleep Medicine.    References  1. Swati et al. Objectively measured vs self-reported compliance during oral appliance therapy for sleep-disordered breathing. Chest 2013; 144(5): 6025-7285.  2. Tony et al. Objective measurement of compliance during oral appliance therapy for sleep-disordered breathing. Thorax 2013; 68(1): 91-96.  3. Jamey, et al. Mandibular advancement devices in 620 men and women with GERRY and snoring: tolerability and predictors of treatment success. Chest 2004; 125: 9792-1797.  4. Yennifer, et al. Oral appliances for snoring and GERRY: a review. Sleep 2006; 29: 244-262.  5. Subhash, et al. Oral appliance treatment for GERRY: an update. J Clin Sleep Med 2014; 10(2): 215-227.  6. Katie et al. Predictors of OSAH treatment  outcome. J Dade Res 2007; 86: 3132-8672.    Weight Loss:    Weight loss is a long-term strategy that may improve sleep apnea in some patients.    Weight management is a personal decision and the decision should be based on your interest and the potential benefits.  If you are interested in exploring weight loss strategies, the following discussion covers the impact on weight loss on sleep apnea and the approaches that may be successful.    Being overweight does not necessarily mean you will have health consequences.  Those who have BMI over 35 or over 27 with existing medical conditions carries greater risk.   Weight loss decreases severity of sleep apnea in most people with obesity. For those with mild obesity who have developed snoring with weight gain, even 15-30 pound weight loss can improve and occasionally eliminate sleep apnea.  Structured and life-long dietary and health habits are necessary to lose weight and keep healthier weight levels.     Though there may be significant health benefits from weight loss, long-term weight loss is very difficult to achieve- studies show success with dietary management in less than 10% of people. In addition, substantial weight loss may require years of dietary control and may be difficult if patients have severe obesity. In these cases, surgical management may be considered.  Finally, older individuals who have tolerated obesity without health complications may be less likely to benefit from weight loss strategies.      Your BMI is Body mass index is 30.23 kg/(m^2).  Weight management is a personal decision.  If you are interested in exploring weight loss strategies, the following discussion covers the approaches that may be successful. Body mass index (BMI) is one way to tell whether you are at a healthy weight, overweight, or obese. It measures your weight in relation to your height.  A BMI of 18.5 to 24.9 is in the healthy range. A person with a BMI of 25 to 29.9 is  considered overweight, and someone with a BMI of 30 or greater is considered obese. More than two-thirds of American adults are considered overweight or obese.  Being overweight or obese increases the risk for further weight gain. Excess weight may lead to heart disease and diabetes.  Creating and following plans for healthy eating and physical activity may help you improve your health.  Weight control is part of healthy lifestyle and includes exercise, emotional health, and healthy eating habits. Careful eating habits lifelong are the mainstay of weight control. Though there are significant health benefits from weight loss, long-term weight loss with diet alone may be very difficult to achieve- studies show long-term success with dietary management in less than 10% of people. Attaining a healthy weight may be especially difficult to achieve in those with severe obesity. In some cases, medications, devices and surgical management might be considered.  What can you do?  If you are overweight or obese and are interested in methods for weight loss, you should discuss this with your provider.     Consider reducing daily calorie intake by 500 calories.     Keep a food journal.     Avoiding skipping meals, consider cutting portions instead.    Diet combined with exercise helps maintain muscle while optimizing fat loss. Strength training is particularly important for building and maintaining muscle mass. Exercise helps reduce stress, increase energy, and improves fitness. Increasing exercise without diet control, however, may not burn enough calories to loose weight.       Start walking three days a week 10-20 minutes at a time    Work towards walking thirty minutes five days a week     Eventually, increase the speed of your walking for 1-2 minutes at time    In addition, we recommend that you review healthy lifestyles and methods for weight loss available through the National Institutes of Health patient information  sites:  http://win.niddk.nih.gov/publications/index.htm    And look into health and wellness programs that may be available through your health insurance provider, employer, local community center, or imtiaz club.    Weight management plan: Patient was referred to their PCP to discuss a diet and exercise plan.    Surgery:    Surgery for obstructive sleep apnea is considered generally only when other therapies fail to work. Surgery may be discussed with you if you are having a difficult time tolerating CPAP and or when there is an abnormal structure that requires surgical correction.  Nose and throat surgeries often enlarge the airway to prevent collapse.  Most of these surgeries create pain for 1-2 weeks and up to half of the most common surgeries are not effective throughout life.  You should carefully discuss the benefits and drawbacks to surgery with your sleep provider and surgeon to determine if it is the best solution for you.   More information  Surgery for GERRY is directed at areas that are responsible for narrowing or complete obstruction of the airway during sleep.  There are a wide range of procedures available to enlarge and/or stabilize the airway to prevent blockage of breathing in the three major areas where it can occur: the palate, tongue, and nasal regions.  Successful surgical treatment depends on the accurate identification of the factors responsible for obstructive sleep apnea in each person.  A personalized approach is required because there is no single treatment that works well for everyone.  Because of anatomic variation, consultation with an examination by a sleep surgeon is a critical first step in determining what surgical options are best for each patient.  In some cases, examination during sedation may be recommended in order to guide the selection of procedures.  Patients will be counseled about risks and benefits as well as the typical recovery course after surgery. Surgery is typically  not a cure for a person s GERRY.  However, surgery will often significantly improve one s GERRY severity (termed  success rate ).  Even in the absence of a cure, surgery will decrease the cardiovascular risk associated with OSA7; improve overall quality of life8 (sleepiness, functionality, sleep quality, etc).  Palate Procedures:  Patients with GERRY often have narrowing of their airway in the region of their tonsils and uvula.  The goals of palate procedures are to widen the airway in this region as well as to help the tissues resist collapse.  Modern palate procedure techniques focus on tissue conservation and soft tissue rearrangement, rather than tissue removal.  Often the uvula is preserved in this procedure. Residual sleep apnea is common in patient after pharyngoplasty with an average reduction in sleep apnea events of 33%2.    Tongue Procedures:  ExamWhile patients are awake, the muscles that surround the throat are active and keep this region open for breathing. These muscles relax during sleep, allowing the tongue and other structures to collapse and block breathing.  There are several different tongue procedures available.  Selection of a tongue base procedure depends on characteristics seen on physical exam.  Generally, procedures are aimed at removing bulky tissues in this area or preventing the back of the tongue from falling back during sleep.  Success rates for tongue surgery range from 50-62%3.  Hypoglossal Nerve Stimulation:  Hypoglossal nerve stimulation has recently received approval from the [place] Food and Drug Administration for the treatment of obstructive sleep apnea.  This is based on research showing that the system was safe and effective in treating sleep apnea6.  Results showed that the median AHI score decreased 68%, from 29.3 to 9.0. This therapy uses an implant system that senses breathing patterns and delivers mild stimulation to airway muscles, which keeps the airway open during  sleep.  The system consists of three fully implanted components: a small generator (similar in size to a pacemaker), a breathing sensor, and a stimulation lead.  Using a small handheld remote, a patient turns the therapy on before bed and off upon awakening.    Candidates for this device must be greater than 22 years of age, have moderate to severe GERRY (AHI between 20-65), BMI less than 32, have tried CPAP/oral appliance without success, and have appropriate upper airway anatomy (determined by a sleep endoscopy performed by Dr. Avila).  Hypoglossal Nerve Stimulation Pathway:    The sleep surgeon s office will work with the patient through the insurance prior-authorization process (including communications and appeals).    Nasal Procedures:  Nasal obstruction can interfere with nasal breathing during the day and night.  Studies have shown that relief of nasal obstruction can improve the ability of some patients to tolerate positive airway pressure therapy for obstructive sleep apnea1.  Treatment options include medications such as nasal saline, topical corticosteroid and antihistamine sprays, and oral medications such as antihistamines or decongestants. Non-surgical treatments can include external nasal dilators for selected patients. If these are not successful by themselves, surgery can improve the nasal airway either alone or in combination with these other options.  Combination Procedures:  Combination of surgical procedures and other treatments may be recommended, particularly if patients have more than one area of narrowing or persistent positional disease.  The success rate of combination surgery ranges from 66-80%2,3.    References  1. Gary MARTINEZ. The Role of the Nose in Snoring and Obstructive Sleep Apnoea: An Update.  Eur Arch Otorhinolaryngol. 2011; 268: 1365-73.  2.  Lilliana SM; Caitlin JA; Davy JR; Pallanch JF; Jalen VILLASEÑOR; Inder GUTIERREZ; Shayne ROSARIO. Surgical modifications of the upper airway for obstructive  sleep apnea in adults: a systematic review and meta-analysis. SLEEP 2010;33(10):8946-9663. Titus GALVIN. Hypopharyngeal surgery in obstructive sleep apnea: an evidence-based medicine review.  Arch Otolaryngol Head Neck Surg. 2006 Feb;132(2):206-13.  3. Alexander YH1, Delon Y, Dominick ANABELLE. The efficacy of anatomically based multilevel surgery for obstructive sleep apnea. Otolaryngol Head Neck Surg. 2003 Oct;129(4):327-35.  4. Kezirian E, Goldberg A. Hypopharyngeal Surgery in Obstructive Sleep Apnea: An Evidence-Based Medicine Review. Arch Otolaryngol Head Neck Surg. 2006 Feb;132(2):206-13.  5. Cristian MARROQUIN et al. Upper-Airway Stimulation for Obstructive Sleep Apnea.  N Engl J Med. 2014 Jan 9;370(2):139-49.  6. Kei Y et al. Increased Incidence of Cardiovascular Disease in Middle-aged Men with Obstructive Sleep Apnea. Am J Respir Crit Care Med; 2002 166: 159-165  7. Tanmay HAMILTON et al. Studying Life Effects and Effectiveness of Palatopharyngoplasty (SLEEP) study: Subjective Outcomes of Isolated Uvulopalatopharyngoplasty. Otolaryngol Head Neck Surg. 2011; 144: 623-631.

## 2018-06-14 NOTE — PROGRESS NOTES
Sleep Consultation:    Date on this visit: 6/14/2018    Candelario Evangelista is sent by Norma Servin for a sleep consultation regarding snoring and suspected apnea.    Primary Physician: Norma Servin     Candelario Evangelista reports his girlfriend has observed snoring for years and pauses in his breathing during sleep just recently. His medical history is significant for anxiety, depression, cognitive developmental delay, asthma, HTN.     Candelario goes to sleep at 11:00 PM to midnight during the week. Sometimes he will fall asleep on the floor before waking and going to bed. He wakes up at 5:00-5:30 AM with an alarm at 4:30 and 5 AM. He falls asleep in 10-15 minutes.  Candelario denies difficulty falling asleep.  He wakes up 2 times a night for 5 minutes before falling back to sleep.  Candelario wakes up to go to the bathroom.  On weekends, his sleep schedule is variable. He might wake around 6 AM but go back to sleep for another 1-2 hours.  He still has his alarm set on days off, but he just turns it off and goes back to sleep. Patient gets an average of 5 hours of sleep per night.     Patient does use electronics (phone, 30-60 minutes sometimes) in bed and does not watch TV in bed, worry in bed about anything and read in bed.     Candelario does not do shift work.  He works day shifts. His days off are Wednesdays and Thursdays. He works as a  at [employer].  He currently lives alone.    Candelario does snore every night and snoring is loud. Patient does have a regular bed partner. There is report of gasping.  He does have witnessed apneas. They never sleep separately due to his snoring.  Patient sleeps on his back and side. He has occasional morning dry mouth, denies snort arousals, morning confusion and restless legs. He gets margi horses sometimes. He wakes with headaches 1-2 times per week. Candelario has occasional sleep talking and denies any sleep walking, dream enactment, sleep paralysis, cataplexy and  hypnogogic/hypnopompic hallucinations. He used to grind his teeth, but is not sure if he still does.    Candelario has reflux at night, heartburn and depression, denies difficulty breathing through his nose and claustrophobia.      Candelario has gained 10 pounds in 5 years.  Patient describes themself as a morning person.  He would prefer to go to sleep at 10:00 PM and wake up at 6:00 AM.  Patient's [place] Sleepiness score 10/24 consistent with mild daytime sleepiness.      Candelario naps 5-6 times per week for 1 to 2.5 hours, feels refreshed after naps. He naps after work. He takes some inadvertant naps while eating.  He denies dozing while driving. He has felt drowsy, but never fallen asleep. Patient was counseled on the importance of driving while alert, to pull over if drowsy, or nap before getting into the vehicle if sleepy.  He uses 6 sodas/day. Last caffeine intake can be anytime.    Allergies:    Allergies   Allergen Reactions     Latex Rash       Medications:    Current Outpatient Prescriptions   Medication Sig Dispense Refill     ibuprofen (ADVIL,MOTRIN) 200 MG tablet Take 1-2 tablets (200-400 mg) by mouth every 8 hours as needed for mild pain       venlafaxine (EFFEXOR-XR) 75 MG 24 hr capsule TAKE THREE CAPSULES BY MOUTH EVERY DAY 90 capsule 11       Problem List:  Patient Active Problem List    Diagnosis Date Noted     Snoring 05/31/2018     Priority: Medium     Obstructive sleep apnea syndrome 05/31/2018     Priority: Medium     Body mass index 30.0-30.9, adult 05/31/2018     Priority: Medium     Cognitive developmental delay 05/31/2018     Priority: Medium     Tongue lesion 11/28/2017     Priority: Medium     Intermittent asthma, uncomplicated 09/28/2017     Priority: Medium     Oral aphthae 09/28/2017     Priority: Medium     Poor dentition 09/28/2017     Priority: Medium     Abdominal pain, generalized 05/27/2014     Priority: Medium     Weight loss 12/02/2013     Priority: Medium     Diverticulosis of large  intestine 12/02/2013     Priority: Medium     Problem list name updated by automated process. Provider to review       External hemorrhoids 12/02/2013     Priority: Medium     Lump of axilla 06/15/2011     Priority: Medium     MRSA (methicillin resistant staph aureus) culture positive 05/09/2011     Priority: Medium     Overweight (BMI 25.0-29.9) 03/28/2011     Priority: Medium     Intermittent asthma 02/15/2011     Priority: Medium     Hypertension goal BP (blood pressure) < 140/90 02/08/2011     Priority: Medium     CARDIOVASCULAR SCREENING; LDL GOAL LESS THAN 160 05/09/2010     Priority: Medium     Blood in stool 12/15/2009     Priority: Medium     Internal hemorrhoids 11/16/2009     Priority: Medium     Moderate recurrent major depression (H) 08/31/2007     Priority: Medium     Generalized anxiety disorder 08/31/2007     Priority: Medium        Past Medical/Surgical History:  Past Medical History:   Diagnosis Date     Anxiety      Depressive disorder      GENERALIZED ANXIETY DIS 8/31/2007     GERD (gastroesophageal reflux disease)      Head injury      Hypertension      Intermittent asthma 2/15/2011     Moderate recurrent major depression (H) 8/31/2007    Sees a Psychiatrist. Does not want to fill out a PHQ - 9     Problem, psychiatric      Past Surgical History:   Procedure Laterality Date     HERNIA REPAIR, INGUINAL RT/LT       HERNIA REPAIR, INGUINAL RT/LT         Social History:  Social History     Social History     Marital status: Single     Spouse name: N/A     Number of children: 0     Years of education: 12     Occupational History     environmental services      Social History Main Topics     Smoking status: Never Smoker     Smokeless tobacco: Never Used      Comment: second hand at work     Alcohol use No     Drug use: No     Sexual activity: Not Currently     Partners: Female     Birth control/ protection: Condom     Other Topics Concern     Parent/Sibling W/ Cabg, Mi Or Angioplasty Before 65f 55m?  Yes     Social History Narrative       Family History:  Family History   Problem Relation Age of Onset     C.A.D. Mother      Hypertension Mother      ?     HEART DISEASE Mother      MENTAL ILLNESS Mother      Depression Mother      C.A.D. Father      DIABETES Father      CEREBROVASCULAR DISEASE Father      Hypertension Father      ?     Cancer - colorectal Father      Sleep Apnea Father        Review of Systems:  A complete review of systems reviewed by me is negative with the exeption of what has been mentioned in the history of present illness.  CONSTITUTIONAL: NEGATIVE for weight gain/loss, fever, chills, sweats or night sweats, drug allergies.  EYES: NEGATIVE for changes in vision, blind spots, double vision.  ENT: NEGATIVE for ear pain, sore throat, sinus pain, post-nasal drip, bloody nose  ENT:  POSITIVE for  runny nose  CARDIAC: NEGATIVE for fast heartbeats or fluttering in chest, chest pain or pressure, breathlessness when lying flat, swollen legs or swollen feet.  CARDIAC:  POSITIVE for  HTN  NEUROLOGIC: NEGATIVE weakness or numbness in the arms or legs.  NEUROLOGIC:  POSITIVE for  headaches  DERMATOLOGIC: NEGATIVE for rashes, new moles or change in mole(s)  PULMONARY: NEGATIVE SOB at rest, SOB with activity, dry cough, coughing up blood, wheezing or whistling when breathing.    PULMONARY:  POSITIVE for  productive cough  GASTROINTESTINAL: NEGATIVE for nausea or vomitting, fat or grease in stools, constipation, abdominal pain, bowel movements black in color.  GASTROINTESTINAL:  POSITIVE for  loose or watery stools and blood in stool-diverticulitis  GENITOURINARY: NEGATIVE for pain during urination, blood in urine, urinating more frequently than usual, irregular menstrual periods.  MUSCULOSKELETAL: NEGATIVE for muscle pain, bone or joint pain, swollen joints.  ENDOCRINE: NEGATIVE for increased urination, diabetes.  ENDOCRINE:  POSITIVE for  increased thirst- not new  LYMPHATIC: NEGATIVE for swollen lymph  "nodes, lumps or bumps in the breasts or nipple discharge.    Physical Examination:  Vitals: /87  Pulse 111  Resp 16  Ht 1.702 m (5' 7\")  Wt 87.5 kg (193 lb)  SpO2 97%  BMI 30.23 kg/m2  BMI= Body mass index is 30.23 kg/(m^2).    Neck Cir (cm): 43 cm    [place] Total Score 6/14/2018   Total score - [place] 10       GENERAL APPEARANCE: healthy, alert, no distress and cooperative  EYES: Eyes grossly normal to inspection, PERRL, conjunctivae and sclerae normal and lids and lashes normal  HENT: nose and mouth without ulcers or lesions, oropharynx very small and crowded and tongue base enlarged, slight retrognathia  NECK: no adenopathy, no asymmetry, masses, or scars, thyroid normal to palpation and trachea midline and normal to palpation  RESP: lungs clear to auscultation - no rales, rhonchi or wheezes  CV: regular rates and rhythm, normal S1 S2, no S3 or S4, no murmur, click or rub and no irregular beats  LYMPHATICS: no cervical adenopathy  MS: extremities normal- no gross deformities noted  NEURO: Normal strength and tone, mentation intact, speech normal and cranial nerves 2-12 intact  Mallampati Class: IV.  Tonsillar Stage: 1  hidden by pillars.    Impression/Plan:    (G47.30) Observed sleep apnea  (primary encounter diagnosis), (R06.83) Snoring, (I10) Hypertension goal BP (blood pressure) < 140/90  Comment: Mr. Evangelista presents with concerns of sleep apnea. His girlfriend recently observed him to have a couple of pauses in breathing lasting up to a minute.  He also was observed to gasp in his sleep. He was not observed while sleeping until recently. He has a long history of snoring. He has mild sleepiness, ESS 10/24. He naps almost daily after work for 1-2.5 hours. This may be a function of him only sleeping 5 hours at night. His blood pressures have been variable. Medications dropped his pressures too low. His BP was normal today. His neck circumference is 43 cm and he is male. He does have a small and " crowded airway and is slightly retrognathic. Negative risk factors include; BMI <35 (30), age <50 (48).   Plan: Comprehensive Sleep Study        Split night PSG with CPAP/BiPAP titration if indicated.       Literature provided regarding sleep apnea.      He will follow up with me in approximately two weeks after his sleep study has been competed to review the results and discuss plan of care.       Polysomnography reviewed.  Obstructive sleep apnea reviewed.  Complications of untreated sleep apnea were reviewed.  45 minutes was spent during this visit, over 50% in counseling and coordination of care.   Bennett Goltz, PA-C    CC: Norma Servin

## 2018-06-27 ENCOUNTER — THERAPY VISIT (OUTPATIENT)
Dept: SLEEP MEDICINE | Facility: CLINIC | Age: 48
End: 2018-06-27
Payer: COMMERCIAL

## 2018-06-27 DIAGNOSIS — I10 HYPERTENSION GOAL BP (BLOOD PRESSURE) < 140/90: ICD-10-CM

## 2018-06-27 DIAGNOSIS — G47.33 OBSTRUCTIVE SLEEP APNEA SYNDROME: ICD-10-CM

## 2018-06-27 DIAGNOSIS — R06.83 SNORING: ICD-10-CM

## 2018-06-27 DIAGNOSIS — G47.30 OBSERVED SLEEP APNEA: ICD-10-CM

## 2018-06-27 PROCEDURE — 95811 POLYSOM 6/>YRS CPAP 4/> PARM: CPT | Performed by: INTERNAL MEDICINE

## 2018-06-27 NOTE — MR AVS SNAPSHOT
After Visit Summary   6/27/2018    Candelario Evangelista    MRN: [id number]           Patient Information     Date Of Birth          1970        Visit Information        Provider Department      6/27/2018 8:30 PM BED 3 SH SLEEP [facility]        Today's Diagnoses     Snoring        Hypertension goal BP (blood pressure) < 140/90        Observed sleep apnea          Care Instructions    Completed a split night PSG per provider order.    Preliminary AHI >40.  A final therapeutic PAP pressure was achieved.    Supine REM was seen on therapeutic pressure.    Patient reports feeling refreshed in AM.          Follow-ups after your visit        Your next 10 appointments already scheduled     Jul 11, 2018 10:00 AM CDT   Return Sleep Patient with Bennett Ezra Goltz, PA-C   [facility]    [address] [place] [id number] [phone]              Who to contact     If you have questions or need follow up information about today's clinic visit or your schedule please contact [facility] directly at [phone].  Normal or non-critical lab and imaging results will be communicated to you by MyChart, letter or phone within 4 business days after the clinic has received the results. If you do not hear from us within 7 days, please contact the clinic through MyChart or phone. If you have a critical or abnormal lab result, we will notify you by phone as soon as possible.  Submit refill requests through [employer] or call your pharmacy and they will forward the refill request to us. Please allow 3 business days for your refill to be completed.          Additional Information About Your Visit        Care EveryWhere ID     This is your Care EveryWhere ID. This could be used by other organizations to access your [facility] medical records  [id number]         Blood Pressure from Last 3 Encounters:   06/14/18 124/87    05/31/18 (!) 144/99   09/28/17 119/86    Weight from Last 3 Encounters:   06/14/18 87.5 kg (193 lb)   05/31/18 88.7 kg (195 lb 8 oz)   10/31/17 85.3 kg (188 lb)              We Performed the Following     Comprehensive Sleep Study        Primary Care Provider Office Phone # Fax #    Norma Servin [phone]       [address]  [facility] [address]        Equal Access to Services     LYUBOV STANFORD : Hadii aad ku hadasho Soomaali, waaxda luqadaha, qaybta kaalmada adeegyada, waxay idiin hayaan adeanderson thrasher . So [facility] [phone].    ATENCIÓN: Si habla español, tiene a duarte disposición servicios gratuitos de asistencia lingüística. [facility] [phone].    We comply with applicable federal civil rights laws and [place] laws. We do not discriminate on the basis of race, color, national origin, age, disability, sex, sexual orientation, or gender identity.            Thank you!     Thank you for choosing [facility]  for your care. Our goal is always to provide you with excellent care. Hearing back from our patients is one way we can continue to improve our services. Please take a few minutes to complete the written survey that you may receive in the mail after your visit with us. Thank you!             Your Updated Medication List - Protect others around you: Learn how to safely use, store and throw away your medicines at www.disposemymeds.org.          This list is accurate as of 6/27/18 11:59 PM.  Always use your most recent med list.                   Brand Name Dispense Instructions for use Diagnosis    ibuprofen 200 MG tablet    ADVIL/MOTRIN     Take 1-2 tablets (200-400 mg) by mouth every 8 hours as needed for mild pain        venlafaxine 75 MG 24 hr capsule    EFFEXOR-XR    90 capsule    TAKE THREE CAPSULES BY MOUTH EVERY DAY    Recurrent major depressive disorder, in partial remission (H)

## 2018-06-28 LAB — SLPCOMP: NORMAL

## 2018-06-28 NOTE — PATIENT INSTRUCTIONS
Completed a split night PSG per provider order.    Preliminary AHI >40.  A final therapeutic PAP pressure was achieved.    Supine REM was seen on therapeutic pressure.    Patient reports feeling refreshed in AM.

## 2018-06-28 NOTE — PROCEDURES
" SLEEP STUDY INTERPRETATION  SPLIT NIGHT STUDY      Patient: SHARON SIMENTAL  YOB: 1970  Study Date: 6/27/2018  MRN: [id number]  Referring Provider: MD Servin Carolyn  Ordering Provider: Goltz, PA-C, Bennett    Indications for Polysomnography: The patient is a 48 y old Male who is 5' 7\" and weighs 193.0 lbs. His BMI is 30.3, [place] sleepiness scale 10.0 and neck circumference is 43.0 cm. Relevant medical history includes anxiety, depression, cognitive developmental delay, asthma, HTN, and obesity. A diagnostic polysomnogram was performed to evaluate for GERRY. After 138.0 minutes of sleep time the patient exhibited sufficient respiratory events qualifying him for a CPAP trial which was then initiated.    Polysomnogram Data: A full night polysomnogram recorded the standard physiologic parameters including EEG, EOG, EMG, ECG, nasal and oral airflow. Respiratory parameters of chest and abdominal movements were recorded with respiratory inductance plethysmography. Oxygen saturation was recorded by pulse oximetry.  Hypopnea scoring rule used: 1B 4%    Diagnostic PSG  Sleep Architecture: Decreased sleep latency without sleep aid, increased arousal index, and normal sleep efficiency.  The total recording time of the polysomnogram was 154.4 minutes. The total sleep time was 138.0 minutes. Sleep latency was decreased at 4.4 minutes without the use of a sleep aid. REM latency was 40.0 minutes. Arousal index was increased at 84.3 arousals per hour. Sleep efficiency was normal at 89.4%. Wake after sleep onset was 8.0 minutes. The patient spent 6.2% of total sleep time in Stage N1, 80.4% in Stage N2, 2.9% in Stage N3, and 10.5% in REM. Time in REM supine was - minutes.    Respiration: Severe obstructive sleep apnea with associated hypoxia.    Events ? The polysomnogram revealed a presence of 94 obstructive, 2 central, and 64 mixed apneas resulting in an apnea index of 69.6 events per hour. There were 24 " obstructive hypopneas and - central hypopneas resulting in an obstructive hypopnea index of 10.4 events per hour. The combined apnea/hypopnea index was 80.0 events per hour (central apnea/hypopnea index was 0.9 events per hour).  The REM AHI was - events per hour. The supine AHI was 83.2 events per hour. The RERA index was 4.3 events per hour. The RDI was 84.3 events per hour.    Snoring - was reported as loud.    Respiratory rate and pattern - was normal.    Sustained Sleep Associated Hypoventilation - Transcutaneous carbon dioxide monitoring was not used; however significant hypoventilation was not suggested by oximetry.    Sleep Associated Hypoxemia - (Greater than 5 minutes O2 sat at or below 88%) was not present. Baseline oxygen saturation was 91.6%. Lowest oxygen saturation was 70.1%. Time spent less than or equal to 88% was 28.1 minutes. Time spent less than or equal to 89% was 34.0 minutes.     Treatment PSG  Sleep Architecture: On PAP, normal arousal index and normal sleep efficiency.  At 01:59:57 AM the patient was placed on PAP treatment and was titrated at pressures ranging from CPAP 5 cmH2O up to CPAP 11 cmH2O. The total recording time of the treatment portion of the study was 280.0 minutes. The total sleep time was 247.0 minutes. During the treatment portion of the study the sleep latency was 0.0 minutes. REM latency was 21.5 minutes. Arousal index was normal at 28.7 arousals per hour. Sleep efficiency was normal at 88.2%. Wake after sleep onset was 23.5 minutes. The patient spent 4.0% of total sleep time in Stage N1, 11.7% in Stage N2, 5.5% in Stage N3, and 78.7% in REM. Time in REM supine was 147.5 minutes.     Respiration: Good titration.  The final pressure was CPAP 11 cmH2O with an AHI of 11.0 events per hour. Time in REM supine on final pressure was 65.5 minutes. This titration was considered: Good (residual AHI < 10 events per hour or 50% decrease if baseline AHI > 15 events per hour, and  including REM?supine sleep at final pressure).    Movement Activity: Increased PLM index without obvious increase in PLM arousals.    Periodic Limb Movements  o During the diagnostic portion of the study, there were 25 PLMs recorded. The PLM index was 10.9 movements per hour. The PLM Arousal Index was - per hour.  o During the treatment portion of the study, there were 160 PLMs recorded. The PLM index was 38.9 movements per hour. The PLM Arousal Index was 2.9 per hour.    REM EMG Activity - Excessive muscle activity was not present.    Nocturnal Behavior - Abnormal sleep related behaviors were not noted.    Bruxism - None apparent.    Cardiac Summary: No significant arrhythmias noted.  During the diagnostic portion of the study, the average pulse rate was 71.5 bpm. The minimum pulse rate was 54.6 bpm while the maximum pulse rate was 108.1 bpm.    During the treatment portion of the study, the average pulse rate was 76.2 bpm. The minimum pulse rate was 60.9 bpm while the maximum pulse rate was 112.0 bpm.     Assessment:     Decreased sleep latency without sleep aid, increased arousal index, and normal sleep efficiency.    On PAP, normal arousal index and normal sleep efficiency.    Severe obstructive sleep apnea with associated hypoxia.    Good titration.    Increased PLM index without obvious increase in PLM arousals.    No significant arrhythmias noted.    Recommendations:    Treatment of GERRY with Auto?titrating PAP therapy with a range of 9 cmH2O to 14 cmH2O. Recommend clinical follow up with sleep management team, including review of compliance measures.    If devices are not acceptable or effective, patient may benefit from evaluation of possible surgical options for the treatment of obstructive sleep apnea and/or socially disruptive snoring. If he is interested, would recommend referral to specialized ENT?Sleep provider.    Advice regarding the risks of drowsy driving.    Suggest optimizing sleep schedule and  avoiding sleep deprivation.    Weight management (if BMI > 30).    Pharmacologic therapy should be used for management of restless legs syndrome only if present and clinically indicated and not based on the presence of periodic limb movements alone.    Diagnostic Codes:   Obstructive Sleep Apnea G47.33  Sleep Hypoxemia/Hypoventilation G47.36    _____________________________________   Electronically Signed By: Red Patel MD 6/28/2018

## 2018-06-29 ENCOUNTER — TELEPHONE (OUTPATIENT)
Dept: SLEEP MEDICINE | Facility: CLINIC | Age: 48
End: 2018-06-29

## 2018-06-29 DIAGNOSIS — G47.33 OSA (OBSTRUCTIVE SLEEP APNEA): Primary | ICD-10-CM

## 2018-06-29 NOTE — TELEPHONE ENCOUNTER
I called Mr. Evangelista to review his PSG results and discuss not waiting until his follow up visit to start therapy.   His study revealed severe GERRY with an AHI of 80/hr and an O2 karma of 70%. He spent 28.1 minutes below 89%. CPAP was titrated to 11 cm. His residual AHI was 11/hr (1/3 of events were centrals).   Interestingly, he had 78.7% of the CPAP titration in REM sleep, suggesting significant REM rebound.    I called and left a message asking for a return call.  Bennett Goltz, PA-C

## 2018-06-29 NOTE — TELEPHONE ENCOUNTER
I spoke with Candelario and reviewed his study results. I am placing an order for auto CPAP 9-14 cm, heated humidifier and compliance meter. The patient was informed of the mask exchange policy and the compliance goals. They were also informed that they would be followed by the sleep therapy management team during the first month of CPAP use.   His follow up appointment will be pushed out from 7/11 to about 2 months.  Bennett Goltz, PA-C

## 2018-07-05 ENCOUNTER — DOCUMENTATION ONLY (OUTPATIENT)
Dept: SLEEP MEDICINE | Facility: CLINIC | Age: 48
End: 2018-07-05
Payer: COMMERCIAL

## 2018-07-05 NOTE — PROGRESS NOTES
Patient was offered choice of vendor and chose [facility].  Patient Candelario Evangelista was set up at [facility] on July 5, 2018. Patient received a [employer] DreamStation Auto. Pressures were set at 9-12 cm H2O.   Patient s ramp is 6 cm H2O for Auto and FLEX/EPR is A Flex.  Patient received a [employer]med Mask name: AIRFIT F20  Full Face mask Size Small, heated tubing and heated humidifier.  Patient is enrolled in the STM Program and does need to meet compliance. Patient has a follow up on 8/29/2018 with Bennett Goltz, PA.    Kendrick Sigala

## 2018-07-09 ENCOUNTER — DOCUMENTATION ONLY (OUTPATIENT)
Dept: SLEEP MEDICINE | Facility: CLINIC | Age: 48
End: 2018-07-09

## 2018-07-09 DIAGNOSIS — G47.33 OBSTRUCTIVE SLEEP APNEA SYNDROME: ICD-10-CM

## 2018-07-09 NOTE — PROGRESS NOTES
3 DAY STM VISIT    Diagnostic AHI: 80       Patient contacted for 3 day STM visit  Message left for patient to return call     Device type: Auto-CPAP  PAP settings from order::  CPAP min 9 cm  H20       CPAP max 14 cm  H20  Mask type:    Full Face Mask     Device settings from machine      Min CPAP 9            Max CPAP 14      Assessment: Nightly usage over four hours.  Action plan: Pt to have f/u 14 day STM visit.  Patient has a follow up visit scheduled:   yes within 31-90 days of set up.

## 2018-07-20 ENCOUNTER — DOCUMENTATION ONLY (OUTPATIENT)
Dept: SLEEP MEDICINE | Facility: CLINIC | Age: 48
End: 2018-07-20
Payer: COMMERCIAL

## 2018-07-20 NOTE — PROGRESS NOTES
14 DAY STM VISIT    Diagnostic AHI: 80      Message left for patient to return call.     Assessment: Pt meeting objective benchmarks.     Action plan: Waiting for patient to return call and pt to have 30 day STM visit.   Device type: Auto-CPAP  PAP settings: CPAP min 9 cm  H20     CPAP max 14 cm  H20     90th % [id number] cm  H20    Mask type:   Full Face Mask     Objective measures: 14 day rolling measures         Compliance  57 %      % of night spent in large leak  16 % last  upload      AHI 4.33   last  upload      Average number of minutes 205     Average hours of usage 3.4              Objective measure goal  Compliance   Goal >70%  Leak   Goal < 10%  AHI  Goal < 5  Usage  Goal >240

## 2018-08-06 ENCOUNTER — DOCUMENTATION ONLY (OUTPATIENT)
Dept: SLEEP MEDICINE | Facility: CLINIC | Age: 48
End: 2018-08-06
Payer: COMMERCIAL

## 2018-08-06 NOTE — PROGRESS NOTES
30 DAY [facility] VISIT    Diagnostic AHI: 80 PSG      Message left for patient to return call.    Assessment: Pt meeting objective benchmarks.     Action plan: waiting for patient to return call.   Patient has a follow up visit with Chetan on 8/29/2018.  Device type: Auto-CPAP  PAP settings: CPAP min 9 cm  H20     CPAP max 14 cm  H20     90th % [id number] cm  H20    Mask type:  Full Face Mask    Objective measures: 14 day rolling measures         Compliance  78 %     % of night spent in large leak  17 % last  upload      AHI 1.56   last  upload      Average number of minutes 289          Objective measure goal  Compliance   Goal >70%  Leak   Goal < 10%  AHI  Goal < 5  Usage  Goal >240

## 2018-08-14 ENCOUNTER — OFFICE VISIT (OUTPATIENT)
Dept: FAMILY MEDICINE | Facility: CLINIC | Age: 48
End: 2018-08-14
Payer: COMMERCIAL

## 2018-08-14 VITALS
TEMPERATURE: 98.4 F | WEIGHT: 198.75 LBS | RESPIRATION RATE: 18 BRPM | SYSTOLIC BLOOD PRESSURE: 139 MMHG | DIASTOLIC BLOOD PRESSURE: 103 MMHG | BODY MASS INDEX: 31.13 KG/M2 | HEART RATE: 93 BPM | OXYGEN SATURATION: 98 %

## 2018-08-14 DIAGNOSIS — I10 HYPERTENSION GOAL BP (BLOOD PRESSURE) < 140/90: Primary | ICD-10-CM

## 2018-08-14 PROCEDURE — 36415 COLL VENOUS BLD VENIPUNCTURE: CPT | Performed by: FAMILY MEDICINE

## 2018-08-14 PROCEDURE — 80053 COMPREHEN METABOLIC PANEL: CPT | Performed by: FAMILY MEDICINE

## 2018-08-14 PROCEDURE — 82043 UR ALBUMIN QUANTITATIVE: CPT | Performed by: FAMILY MEDICINE

## 2018-08-14 PROCEDURE — 99213 OFFICE O/P EST LOW 20 MIN: CPT | Performed by: FAMILY MEDICINE

## 2018-08-14 RX ORDER — LISINOPRIL 10 MG/1
10 TABLET ORAL DAILY
Qty: 30 TABLET | Refills: 1 | Status: SHIPPED | OUTPATIENT
Start: 2018-08-14 | End: 2018-09-06

## 2018-08-14 NOTE — MR AVS SNAPSHOT
After Visit Summary   8/14/2018    Candelario Evangelista    MRN: [id number]           Patient Information     Date Of Birth          1970        Visit Information        Provider Department      8/14/2018 4:40 PM Hector Hung MD [facility]        Today's Diagnoses     Hypertension goal BP (blood pressure) < 140/90    -  1      Care Instructions    See dr Servin as you are scheduled for.     prescription in 2 days if you do not hear anything from us over phone.     If your blood pressure is still really low - you should cut down your new prescription to 1/2 and see how you do.           Follow-ups after your visit        Your next 10 appointments already scheduled     Aug 29, 2018 10:30 AM CDT   Return Sleep Patient with Bennett Ezra Goltz, PA-C   [facility] ([facility])    [address] [id number] [phone]            Sep 06, 2018 11:40 AM CDT   SHORT with Norma Servin MD   [facility] ([facility]    [address] [place] [address] [phone]              Who to contact     If you have questions or need follow up information about today's clinic visit or your schedule please contact [facility] directly at [phone].  Normal or non-critical lab and imaging results will be communicated to you by MyChart, letter or phone within 4 business days after the clinic has received the results. If you do not hear from us within 7 days, please contact the clinic through MyChart or phone. If you have a critical or abnormal lab result, we will notify you by phone as soon as possible.  Submit refill requests through [facility] or call your pharmacy and they will forward the refill request to us. Please allow 3 business days for your refill to be completed.          Additional Information About Your Visit        Care EveryWhere ID      This is your Care EveryWhere ID. This could be used by other organizations to access your [facility] medical records  [id number]        Your Vitals Were     Pulse Temperature Respirations Pulse Oximetry BMI (Body Mass Index)       93 98.4  F (36.9  C) (Tympanic) 18 98% 31.13 kg/m2        Blood Pressure from Last 3 Encounters:   08/14/18 (!) 139/103   06/14/18 124/87   05/31/18 (!) 144/99    Weight from Last 3 Encounters:   08/14/18 198 lb 12 oz (90.2 kg)   06/14/18 193 lb (87.5 kg)   05/31/18 195 lb 8 oz (88.7 kg)              We Performed the Following     Albumin Random Urine Quantitative with Creat Ratio     Comprehensive metabolic panel          Today's Medication Changes          These changes are accurate as of 8/14/18  5:14 PM.  If you have any questions, ask your nurse or doctor.               Start taking these medicines.        Dose/Directions    lisinopril 10 MG tablet   Commonly known as:  PRINIVIL/ZESTRIL   Used for:  Hypertension goal BP (blood pressure) < 140/90        Dose:  10 mg   Take 1 tablet (10 mg) by mouth daily   Quantity:  30 tablet   Refills:  1            Where to get your medicines      These medications were sent to [facility] [place] [id number]     Phone:  [phone]     lisinopril 10 MG tablet                Primary Care Provider Office Phone # Fax #    Norma Servin [phone] [address] [facility] [address]        Equal Access to Services     [facility] : Hadii gregory ku hadasho Soomaali, waaxda luqadaha, qaybta kaalmada adeegyaelan, frida idiin hayaan adeeg kharash la'aan . So [facility] [phone].    ATENCIÓN: Si habla español, tiene a duarte disposición servicios gratuitos de asistencia lingüística. Llame al [phone].    We comply with applicable federal civil rights laws and [place] laws. We do not discriminate on the basis of race, color, national  origin, age, disability, sex, sexual orientation, or gender identity.            Thank you!     Thank you for choosing [facility]  for your care. Our goal is always to provide you with excellent care. Hearing back from our patients is one way we can continue to improve our services. Please take a few minutes to complete the written survey that you may receive in the mail after your visit with us. Thank you!             Your Updated Medication List - Protect others around you: Learn how to safely use, store and throw away your medicines at www.disposemymeds.org.          This list is accurate as of 8/14/18  5:14 PM.  Always use your most recent med list.                   Brand Name Dispense Instructions for use Diagnosis    ibuprofen 200 MG tablet    ADVIL/MOTRIN     Take 1-2 tablets (200-400 mg) by mouth every 8 hours as needed for mild pain        lisinopril 10 MG tablet    PRINIVIL/ZESTRIL    30 tablet    Take 1 tablet (10 mg) by mouth daily    Hypertension goal BP (blood pressure) < 140/90       venlafaxine 75 MG 24 hr capsule    EFFEXOR-XR    90 capsule    TAKE THREE CAPSULES BY MOUTH EVERY DAY    Recurrent major depressive disorder, in partial remission (H)

## 2018-08-14 NOTE — PATIENT INSTRUCTIONS
See dr Servin as you are scheduled for.     prescription in 2 days if you do not hear anything from us over phone.     If your blood pressure is still really low - you should cut down your new prescription to 1/2 and see how you do.

## 2018-08-14 NOTE — PROGRESS NOTES
SUBJECTIVE:   Candelario Evangelista is a 48 year old male who presents to clinic today for the following health issues:    Hypertension Follow-up      Outpatient blood pressures are being checked at work.  Results are over 150's /100's.    Low Salt Diet: not monitoring salt, not salt added      Amount of exercise or physical activity: None    Problems taking medications regularly: No    Medication side effects: lisinopril 20 mg tab; back in 2016 was asked not to take it because BP dropped too low    Diet: regular (no restrictions)    In Dec 2016 - was on bp meds. bp went down too low. Did not feel good. Was advised to stop it.     Last year went to ER with dizziness too and had normal EKG.     Problem list and histories reviewed & adjusted, as indicated.  Additional history: as documented    Labs reviewed in EPIC    Reviewed and updated as needed this visit by clinical staff       Reviewed and updated as needed this visit by Provider        Social History     Social History     Marital status: Single     Spouse name: N/A     Number of children: 0     Years of education: 12     Occupational History     environmental services      Social History Main Topics     Smoking status: Never Smoker     Smokeless tobacco: Never Used      Comment: second hand at work     Alcohol use No     Drug use: No     Sexual activity: Not Currently     Partners: Female     Birth control/ protection: Condom     Other Topics Concern     Parent/Sibling W/ Cabg, Mi Or Angioplasty Before 65f 55m? Yes     Social History Narrative     Allergies   Allergen Reactions     Latex Rash     Patient Active Problem List   Diagnosis     Moderate recurrent major depression (H)     Generalized anxiety disorder     Internal hemorrhoids     Blood in stool     CARDIOVASCULAR SCREENING; LDL GOAL LESS THAN 160     Hypertension goal BP (blood pressure) < 140/90     Intermittent asthma     Overweight (BMI 25.0-29.9)     MRSA (methicillin resistant staph aureus) culture  positive     Lump of axilla     Weight loss     Diverticulosis of large intestine     External hemorrhoids     Abdominal pain, generalized     Intermittent asthma, uncomplicated     Oral aphthae     Poor dentition     Tongue lesion     Snoring     Obstructive sleep apnea syndrome     Body mass index 30.0-30.9, adult     Cognitive developmental delay     Reviewed medications, social history and  past medical and surgical history.    Review of system: for general, respiratory, CVS, GI and psychiatry negative except for noted above.     EXAM:  BP (!) 139/103 (BP Location: Right arm, Patient Position: Chair, Cuff Size: Adult Regular)  Pulse 93  Temp 98.4  F (36.9  C) (Tympanic)  Resp 18  Wt 198 lb 12 oz (90.2 kg)  SpO2 98%  BMI 31.13 kg/m2  Constitutional: healthy, alert and no distress   Psychiatric: mentation appears normal and affect normal/bright  Cardiovascular: RRR. No murmurs,  Respiratory: negative, Lungs clear. No crackles or wheezing. No tachypnea.        ASSESSMENT / PLAN:  (I10) Hypertension goal BP (blood pressure) < 140/90  (primary encounter diagnosis)  Comment: He used to be on high blood pressure medication and he developed hypotension and it was stopped.  It would be reasonable to start lower dose of blood pressure medication and see how he does.  We discussed about silent nature of high blood pressure.  We will do baseline BMP and if is fine resume lisinopril.  See patient instruction.  Plan: Comprehensive metabolic panel, Albumin Random         Urine Quantitative with Creat Ratio, lisinopril        (PRINIVIL/ZESTRIL) 10 MG tablet            Follow up:  already scheduled to see PCP.    Patient Instructions   See dr Servin as you are scheduled for.     prescription in 2 days if you do not hear anything from us over phone.     If your blood pressure is still really low - you should cut down your new prescription to 1/2 and see how you do.

## 2018-08-14 NOTE — LETTER
August 17, 2018      Candelario Evangelista  [address]  [address]  [place] [address]        Dear Candelario,    Your kidney and liver function tests are fine.  Your urine test for looking protein is also normal which is reassuring.    Results for orders placed or performed in visit on 08/14/18   Comprehensive metabolic panel   Result Value Ref Range    Sodium 141 133 - 144 mmol/L    Potassium 4.5 3.4 - 5.3 mmol/L    Chloride 108 94 - 109 mmol/L    Carbon Dioxide 25 20 - 32 mmol/L    Anion Gap 8 3 - 14 mmol/L    Glucose 81 70 - 99 mg/dL    Urea Nitrogen 17 7 - 30 mg/dL    Creatinine 1.01 0.66 - 1.25 mg/dL    GFR Estimate 79 >60 mL/min/1.7m2    GFR Estimate If Black >90 >60 mL/min/1.7m2    Calcium 8.9 8.5 - 10.1 mg/dL    Bilirubin Total 0.4 0.2 - 1.3 mg/dL    Albumin 3.7 3.4 - 5.0 g/dL    Protein Total 7.2 6.8 - 8.8 g/dL    Alkaline Phosphatase 164 (H) 40 - 150 U/L    ALT 30 0 - 70 U/L    AST 30 0 - 45 U/L   Albumin Random Urine Quantitative with Creat Ratio   Result Value Ref Range    Creatinine Urine 172 mg/dL    Albumin Urine mg/L 16 mg/L    Albumin Urine mg/g Cr 9.13 0 - 17 mg/g Cr     Sincerely,      Hector Hung MD/eduar

## 2018-08-15 LAB
ALBUMIN SERPL-MCNC: 3.7 G/DL (ref 3.4–5)
ALP SERPL-CCNC: 164 U/L (ref 40–150)
ALT SERPL W P-5'-P-CCNC: 30 U/L (ref 0–70)
ANION GAP SERPL CALCULATED.3IONS-SCNC: 8 MMOL/L (ref 3–14)
AST SERPL W P-5'-P-CCNC: 30 U/L (ref 0–45)
BILIRUB SERPL-MCNC: 0.4 MG/DL (ref 0.2–1.3)
BUN SERPL-MCNC: 17 MG/DL (ref 7–30)
CALCIUM SERPL-MCNC: 8.9 MG/DL (ref 8.5–10.1)
CHLORIDE SERPL-SCNC: 108 MMOL/L (ref 94–109)
CO2 SERPL-SCNC: 25 MMOL/L (ref 20–32)
CREAT SERPL-MCNC: 1.01 MG/DL (ref 0.66–1.25)
CREAT UR-MCNC: 172 MG/DL
GFR SERPL CREATININE-BSD FRML MDRD: 79 ML/MIN/1.7M2
GLUCOSE SERPL-MCNC: 81 MG/DL (ref 70–99)
MICROALBUMIN UR-MCNC: 16 MG/L
MICROALBUMIN/CREAT UR: 9.13 MG/G CR (ref 0–17)
POTASSIUM SERPL-SCNC: 4.5 MMOL/L (ref 3.4–5.3)
PROT SERPL-MCNC: 7.2 G/DL (ref 6.8–8.8)
SODIUM SERPL-SCNC: 141 MMOL/L (ref 133–144)

## 2018-08-29 ENCOUNTER — OFFICE VISIT (OUTPATIENT)
Dept: SLEEP MEDICINE | Facility: CLINIC | Age: 48
End: 2018-08-29
Payer: COMMERCIAL

## 2018-08-29 VITALS
DIASTOLIC BLOOD PRESSURE: 92 MMHG | HEART RATE: 95 BPM | BODY MASS INDEX: 30.76 KG/M2 | SYSTOLIC BLOOD PRESSURE: 129 MMHG | RESPIRATION RATE: 18 BRPM | TEMPERATURE: 98.2 F | HEIGHT: 67 IN | OXYGEN SATURATION: 93 % | WEIGHT: 196 LBS

## 2018-08-29 DIAGNOSIS — F51.12 INSUFFICIENT SLEEP SYNDROME: ICD-10-CM

## 2018-08-29 DIAGNOSIS — G47.33 OBSTRUCTIVE SLEEP APNEA SYNDROME: Primary | ICD-10-CM

## 2018-08-29 PROCEDURE — 99214 OFFICE O/P EST MOD 30 MIN: CPT | Performed by: PHYSICIAN ASSISTANT

## 2018-08-29 NOTE — PROGRESS NOTES
CPAP Follow-Up Visit:    Date on this visit: 8/29/2018    Candelario Evangelista comes in today for follow-up of his CPAP use for severe GERRY. He was initially seen at the [facility] because his girlfriend has observed snoring for years and pauses in his breathing during sleep just recently. His medical history is significant for anxiety, depression, cognitive developmental delay, asthma, HTN.    His study revealed severe GERRY with an AHI of 80/hr and an O2 karma of 70%. He spent 28.1 minutes below 89%. CPAP was titrated to 11 cm. His residual AHI was 11/hr (1/3 of events were centrals).   Interestingly, he had 78.7% of the CPAP titration in REM sleep, suggesting significant REM rebound.    He is on auto CPAP 9-14 cm. He uses a full face mask. He is still tired. His girlfriend is still observing some snoring. He is aware of some mask leak. He always has a dry mouth. He does not notice it being worse than usual. He denies any problems falling asleep with it. He is comfortable with the pressures. He does not use the humidifier.    The compliance data shows that he has used the CPAP for 29/30 nights, 73.3% of nights for >4 hours.  The 90th% pressure is 11.7 cm.  The average time in large leak is 51 minutes.  The average nightly usage is 5:09.  The average AHI is 1.4/hr.    He sometimes falls asleep before getting the mask on. Sometimes he wake and has a couple of hours left in the night so he does not keep it on. He still naps about 3 times per week.    Past medical/surgical history, family history, social history, medications and allergies were reviewed.      Problem List:  Patient Active Problem List    Diagnosis Date Noted     Snoring 05/31/2018     Priority: Medium     Obstructive sleep apnea syndrome 05/31/2018     Priority: Medium     6/27/2018 [facility] Sleep Study (193.0 lbs) - AHI 80.0, RDI 84.3, Supine AHI 83.2, REM AHI -, Low O2% 70.1%, Time Spent ?88% 28.1, Time Spent ?89% 34.0. Treatment was  titrated to a pressure of CPAP 11 with an AHI 11.0. Time spent in REM supine at this pressure was 65.5 minutes.       Body mass index 30.0-30.9, adult 05/31/2018     Priority: Medium     Cognitive developmental delay 05/31/2018     Priority: Medium     Tongue lesion 11/28/2017     Priority: Medium     Intermittent asthma, uncomplicated 09/28/2017     Priority: Medium     Oral aphthae 09/28/2017     Priority: Medium     Poor dentition 09/28/2017     Priority: Medium     Abdominal pain, generalized 05/27/2014     Priority: Medium     Weight loss 12/02/2013     Priority: Medium     Diverticulosis of large intestine 12/02/2013     Priority: Medium     Problem list name updated by automated process. Provider to review       External hemorrhoids 12/02/2013     Priority: Medium     Lump of axilla 06/15/2011     Priority: Medium     MRSA (methicillin resistant staph aureus) culture positive 05/09/2011     Priority: Medium     Overweight (BMI 25.0-29.9) 03/28/2011     Priority: Medium     Intermittent asthma 02/15/2011     Priority: Medium     Hypertension goal BP (blood pressure) < 140/90 02/08/2011     Priority: Medium     CARDIOVASCULAR SCREENING; LDL GOAL LESS THAN 160 05/09/2010     Priority: Medium     Blood in stool 12/15/2009     Priority: Medium     Internal hemorrhoids 11/16/2009     Priority: Medium     Moderate recurrent major depression (H) 08/31/2007     Priority: Medium     Generalized anxiety disorder 08/31/2007     Priority: Medium        Impression/Plan:    (G47.33) Obstructive sleep apnea syndrome  (primary encounter diagnosis)  Comment: He complains of being tired still, but is significantly short on sleep. He also does not always sleep with the CPAP. He has a high mask leak as well. Snoring is observed on the machine fairly continuously.  Plan: Continue auto CPAP 9-14 cm. I tightened his mask. I think he would do better with a medium size cushion rather than small. I advised him to go to [facility] to get that. We may bump the pressures if snoring is still observed with improved mask fit.    (F51.12) Insufficient sleep syndrome  Comment: He gets 4-5 hours during the week and sleeps 8-11 hours on weekends. He may get more sleep than the CPAP says because he does sleep without the machine at times.  Plan: Try to get into bed at 10:30 PM and use the CPAP until you get up at 5:30 AM.       He will follow up with me in about 6 week(s).     Twenty-five minutes spent with patient, all of which were spent face-to-face counseling, consulting, coordinating plan of care.      Bennett Goltz, PA-C    CC: No ref. provider found

## 2018-08-29 NOTE — NURSING NOTE
"Chief Complaint   Patient presents with     CPAP Follow Up     Follow up michelle       Initial BP (!) 141/99  Pulse 95  Temp 98.2  F (36.8  C) (Oral)  Resp 18  Ht 1.702 m (5' 7\")  Wt 88.9 kg (196 lb)  SpO2 93%  BMI 30.7 kg/m2 Estimated body mass index is 30.7 kg/(m^2) as calculated from the following:    Height as of this encounter: 1.702 m (5' 7\").    Weight as of this encounter: 88.9 kg (196 lb).    Medication Reconciliation: complete    ESS 7    Aletha Barraza MA      "

## 2018-08-29 NOTE — MR AVS SNAPSHOT
After Visit Summary   8/29/2018    Candelario Evangelista    MRN: [id number]           Patient Information     Date Of Birth          1970        Visit Information        Provider Department      8/29/2018 10:30 AM Goltz, Bennett Ezra, PA-C [facility]        Today's Diagnoses     Obstructive sleep apnea syndrome    -  1    Insufficient sleep syndrome          Care Instructions    Go to [facility] to get a medium size F20 mask cushion.  Try to get into bed at 10:30 PM and use the CPAP until you get up at 5:30 AM.     Your BMI is Body mass index is 30.7 kg/(m^2).  Weight management is a personal decision.  If you are interested in exploring weight loss strategies, the following discussion covers the approaches that may be successful. Body mass index (BMI) is one way to tell whether you are at a healthy weight, overweight, or obese. It measures your weight in relation to your height.  A BMI of 18.5 to 24.9 is in the healthy range. A person with a BMI of 25 to 29.9 is considered overweight, and someone with a BMI of 30 or greater is considered obese. More than two-thirds of American adults are considered overweight or obese.  Being overweight or obese increases the risk for further weight gain. Excess weight may lead to heart disease and diabetes.  Creating and following plans for healthy eating and physical activity may help you improve your health.  Weight control is part of healthy lifestyle and includes exercise, emotional health, and healthy eating habits. Careful eating habits lifelong are the mainstay of weight control. Though there are significant health benefits from weight loss, long-term weight loss with diet alone may be very difficult to achieve- studies show long-term success with dietary management in less than 10% of people. Attaining a healthy weight may be especially difficult to achieve in those with severe obesity. In some cases, medications, devices and  surgical management might be considered.  What can you do?  If you are overweight or obese and are interested in methods for weight loss, you should discuss this with your provider.     Consider reducing daily calorie intake by 500 calories.     Keep a food journal.     Avoiding skipping meals, consider cutting portions instead.    Diet combined with exercise helps maintain muscle while optimizing fat loss. Strength training is particularly important for building and maintaining muscle mass. Exercise helps reduce stress, increase energy, and improves fitness. Increasing exercise without diet control, however, may not burn enough calories to loose weight.       Start walking three days a week 10-20 minutes at a time    Work towards walking thirty minutes five days a week     Eventually, increase the speed of your walking for 1-2 minutes at time    In addition, we recommend that you review healthy lifestyles and methods for weight loss available through the National Institutes of Health patient information sites:  http://win.niddk.nih.gov/publications/index.htm    And look into health and wellness programs that may be available through your health insurance provider, employer, local community center, or imtiaz club.    Weight management plan: Patient was referred to their PCP to discuss a diet and exercise plan.              Follow-ups after your visit        Follow-up notes from your care team     Return in about 6 weeks (around 10/10/2018) for CPAP compliance recheck.      Your next 10 appointments already scheduled     Sep 06, 2018 11:40 AM CDT   SHORT with Norma Servin MD   [facility] ([facility]    [address] [place] [address] [phone]            Oct 17, 2018 10:00 AM CDT   Return Sleep Patient with Bennett Ezra Goltz, PA-C   [facility] ([facility])    [address] [address]  "  [phone]              Who to contact     If you have questions or need follow up information about today's clinic visit or your schedule please contact [facility] directly at [phone].  Normal or non-critical lab and imaging results will be communicated to you by MyChart, letter or phone within 4 business days after the clinic has received the results. If you do not hear from us within 7 days, please contact the clinic through MyChart or phone. If you have a critical or abnormal lab result, we will notify you by phone as soon as possible.  Submit refill requests through [facility] or call your pharmacy and they will forward the refill request to us. Please allow 3 business days for your refill to be completed.          Additional Information About Your Visit        Care EveryWhere ID     This is your Care EveryWhere ID. This could be used by other organizations to access your [facility] medical records  [id number]        Your Vitals Were     Pulse Temperature Respirations Height Pulse Oximetry BMI (Body Mass Index)    95 98.2  F (36.8  C) (Oral) 18 1.702 m (5' 7\") 93% 30.7 kg/m2       Blood Pressure from Last 3 Encounters:   08/29/18 (!) 129/92   08/14/18 (!) 139/103   06/14/18 124/87    Weight from Last 3 Encounters:   08/29/18 88.9 kg (196 lb)   08/14/18 90.2 kg (198 lb 12 oz)   06/14/18 87.5 kg (193 lb)              Today, you had the following     No orders found for display       Primary Care Provider Office Phone # Fax #    Norma Servin [phone] [address]  [facility] [address]        Equal Access to Services     CÉSAR STANFORD : Hadii gregory Albarran, kareem recio, qaellita kaalmafrida jiménez. So [facility] [phone].    ATENCIÓN: Si habla español, tiene a duarte disposición servicios gratuitos de asistencia lingüística. Llame al [phone].    We comply with applicable federal civil rights laws and " [place] laws. We do not discriminate on the basis of race, color, national origin, age, disability, sex, sexual orientation, or gender identity.            Thank you!     Thank you for choosing [facility]  for your care. Our goal is always to provide you with excellent care. Hearing back from our patients is one way we can continue to improve our services. Please take a few minutes to complete the written survey that you may receive in the mail after your visit with us. Thank you!             Your Updated Medication List - Protect others around you: Learn how to safely use, store and throw away your medicines at www.disposemymeds.org.          This list is accurate as of 8/29/18 11:10 AM.  Always use your most recent med list.                   Brand Name Dispense Instructions for use Diagnosis    ibuprofen 200 MG tablet    ADVIL/MOTRIN     Take 1-2 tablets (200-400 mg) by mouth every 8 hours as needed for mild pain        lisinopril 10 MG tablet    PRINIVIL/ZESTRIL    30 tablet    Take 1 tablet (10 mg) by mouth daily    Hypertension goal BP (blood pressure) < 140/90       venlafaxine 75 MG 24 hr capsule    EFFEXOR-XR    90 capsule    TAKE THREE CAPSULES BY MOUTH EVERY DAY    Recurrent major depressive disorder, in partial remission (H)

## 2018-09-06 ENCOUNTER — OFFICE VISIT (OUTPATIENT)
Dept: FAMILY MEDICINE | Facility: CLINIC | Age: 48
End: 2018-09-06
Payer: COMMERCIAL

## 2018-09-06 VITALS
DIASTOLIC BLOOD PRESSURE: 88 MMHG | OXYGEN SATURATION: 97 % | RESPIRATION RATE: 13 BRPM | BODY MASS INDEX: 30.85 KG/M2 | SYSTOLIC BLOOD PRESSURE: 128 MMHG | HEART RATE: 101 BPM | TEMPERATURE: 98.4 F | WEIGHT: 197 LBS

## 2018-09-06 DIAGNOSIS — F41.1 GENERALIZED ANXIETY DISORDER: ICD-10-CM

## 2018-09-06 DIAGNOSIS — F33.1 MODERATE RECURRENT MAJOR DEPRESSION (H): ICD-10-CM

## 2018-09-06 DIAGNOSIS — K60.2 ANAL FISSURE: ICD-10-CM

## 2018-09-06 DIAGNOSIS — J45.20 MILD INTERMITTENT ASTHMA WITHOUT COMPLICATION: ICD-10-CM

## 2018-09-06 DIAGNOSIS — I10 HYPERTENSION GOAL BP (BLOOD PRESSURE) < 140/90: Primary | ICD-10-CM

## 2018-09-06 DIAGNOSIS — F33.41 RECURRENT MAJOR DEPRESSIVE DISORDER, IN PARTIAL REMISSION (H): ICD-10-CM

## 2018-09-06 DIAGNOSIS — Z13.1 SCREENING FOR DIABETES MELLITUS: ICD-10-CM

## 2018-09-06 DIAGNOSIS — K64.8 INTERNAL HEMORRHOIDS: ICD-10-CM

## 2018-09-06 LAB
BASOPHILS # BLD AUTO: 0 10E9/L (ref 0–0.2)
BASOPHILS NFR BLD AUTO: 0.3 %
DIFFERENTIAL METHOD BLD: ABNORMAL
EOSINOPHIL # BLD AUTO: 0.1 10E9/L (ref 0–0.7)
EOSINOPHIL NFR BLD AUTO: 2.1 %
ERYTHROCYTE [DISTWIDTH] IN BLOOD BY AUTOMATED COUNT: 16 % (ref 10–15)
HBA1C MFR BLD: 4.9 % (ref 0–5.6)
HCT VFR BLD AUTO: 45.2 % (ref 40–53)
HGB BLD-MCNC: 14.7 G/DL (ref 13.3–17.7)
LYMPHOCYTES # BLD AUTO: 1.6 10E9/L (ref 0.8–5.3)
LYMPHOCYTES NFR BLD AUTO: 25.7 %
MCH RBC QN AUTO: 27.2 PG (ref 26.5–33)
MCHC RBC AUTO-ENTMCNC: 32.5 G/DL (ref 31.5–36.5)
MCV RBC AUTO: 84 FL (ref 78–100)
MONOCYTES # BLD AUTO: 0.5 10E9/L (ref 0–1.3)
MONOCYTES NFR BLD AUTO: 8.5 %
NEUTROPHILS # BLD AUTO: 3.9 10E9/L (ref 1.6–8.3)
NEUTROPHILS NFR BLD AUTO: 63.4 %
PLATELET # BLD AUTO: 236 10E9/L (ref 150–450)
RBC # BLD AUTO: 5.41 10E12/L (ref 4.4–5.9)
WBC # BLD AUTO: 6.2 10E9/L (ref 4–11)

## 2018-09-06 PROCEDURE — 83036 HEMOGLOBIN GLYCOSYLATED A1C: CPT | Performed by: FAMILY MEDICINE

## 2018-09-06 PROCEDURE — 99214 OFFICE O/P EST MOD 30 MIN: CPT | Performed by: FAMILY MEDICINE

## 2018-09-06 PROCEDURE — 85025 COMPLETE CBC W/AUTO DIFF WBC: CPT | Performed by: FAMILY MEDICINE

## 2018-09-06 PROCEDURE — 36415 COLL VENOUS BLD VENIPUNCTURE: CPT | Performed by: FAMILY MEDICINE

## 2018-09-06 RX ORDER — VENLAFAXINE HYDROCHLORIDE 75 MG/1
CAPSULE, EXTENDED RELEASE ORAL
Qty: 270 CAPSULE | Refills: 3 | Status: SHIPPED | OUTPATIENT
Start: 2018-09-06 | End: 2019-09-25

## 2018-09-06 RX ORDER — LISINOPRIL 10 MG/1
10 TABLET ORAL DAILY
Qty: 90 TABLET | Refills: 3 | Status: SHIPPED | OUTPATIENT
Start: 2018-09-06 | End: 2019-02-08

## 2018-09-06 RX ORDER — ZINC OXIDE 20 %
OINTMENT (GRAM) TOPICAL PRN
Qty: 30 G | Refills: 3 | Status: SHIPPED | OUTPATIENT
Start: 2018-09-06 | End: 2018-11-08

## 2018-09-06 ASSESSMENT — ANXIETY QUESTIONNAIRES
5. BEING SO RESTLESS THAT IT IS HARD TO SIT STILL: NOT AT ALL
7. FEELING AFRAID AS IF SOMETHING AWFUL MIGHT HAPPEN: NOT AT ALL
6. BECOMING EASILY ANNOYED OR IRRITABLE: SEVERAL DAYS
2. NOT BEING ABLE TO STOP OR CONTROL WORRYING: SEVERAL DAYS
7. FEELING AFRAID AS IF SOMETHING AWFUL MIGHT HAPPEN: NOT AT ALL
2. NOT BEING ABLE TO STOP OR CONTROL WORRYING: SEVERAL DAYS
6. BECOMING EASILY ANNOYED OR IRRITABLE: SEVERAL DAYS
3. WORRYING TOO MUCH ABOUT DIFFERENT THINGS: SEVERAL DAYS
1. FEELING NERVOUS, ANXIOUS, OR ON EDGE: SEVERAL DAYS
GAD7 TOTAL SCORE: 5
IF YOU CHECKED OFF ANY PROBLEMS ON THIS QUESTIONNAIRE, HOW DIFFICULT HAVE THESE PROBLEMS MADE IT FOR YOU TO DO YOUR WORK, TAKE CARE OF THINGS AT HOME, OR GET ALONG WITH OTHER PEOPLE: NOT DIFFICULT AT ALL
5. BEING SO RESTLESS THAT IT IS HARD TO SIT STILL: SEVERAL DAYS
1. FEELING NERVOUS, ANXIOUS, OR ON EDGE: SEVERAL DAYS
GAD7 TOTAL SCORE: 5
3. WORRYING TOO MUCH ABOUT DIFFERENT THINGS: SEVERAL DAYS
IF YOU CHECKED OFF ANY PROBLEMS ON THIS QUESTIONNAIRE, HOW DIFFICULT HAVE THESE PROBLEMS MADE IT FOR YOU TO DO YOUR WORK, TAKE CARE OF THINGS AT HOME, OR GET ALONG WITH OTHER PEOPLE: NOT DIFFICULT AT ALL

## 2018-09-06 ASSESSMENT — PATIENT HEALTH QUESTIONNAIRE - PHQ9
5. POOR APPETITE OR OVEREATING: SEVERAL DAYS
5. POOR APPETITE OR OVEREATING: NOT AT ALL

## 2018-09-06 NOTE — PROGRESS NOTES
Patient was seen today in clinic.  I discussed results in clinic, please see clinic progress note.    Norma Servin 9/6/2018

## 2018-09-06 NOTE — PROGRESS NOTES
SUBJECTIVE:   Candelario Evangelista is a 48 year old male who presents to clinic today for the following health issues:    Hemorrhoids       Duration: 2 weeks, with hx of chronic problems, has had blood when wiping, describes blood spraying the last few days    Associated with gas    Description:   Pain: no , sometimes sore, no constant pain  Itching: no     Accompanying signs and symptoms:   Blood in stool: YES  Changes in stool pattern: no     History (similar episodes/previous evaluation): recurrent problem    Precipitating or alleviating factors: None    Therapies tried and outcome: none      Hypertension Follow-up      Outpatient blood pressures are not being checked.    Low Salt Diet: not monitoring salt  BP Readings from Last 3 Encounters:   09/06/18 128/88   08/29/18 (!) 129/92   08/14/18 (!) 139/103          Depression and Anxiety Follow-Up    Status since last visit: depending    Other associated symptoms: stressing    Complicating factors:     Significant life event: Yes-  financial      Current substance abuse: None    PHQ-9 5/31/2018 9/6/2018 9/6/2018   Total Score 1 - 1   Q9: Suicide Ideation Not at all Not at all Not at all     FRANDY-7 SCORE 5/31/2018 9/6/2018 9/6/2018   Total Score 0 5 5       PHQ-9  English  PHQ-9   Any Language  FRANDY-7  Suicide Assessment Five-step Evaluation and Treatment (SAFE-T)    Amount of exercise or physical activity: 2-3 days/week for an average of walking a lot more    Problems taking medications regularly: No    Medication side effects: none    Diet: regular (no restrictions)    - decline ACT & Flu Vac    Problem list and histories reviewed & adjusted, as indicated.  Additional history: as documented    Patient Active Problem List   Diagnosis     Moderate recurrent major depression (H)     Generalized anxiety disorder     Internal hemorrhoids     Blood in stool     CARDIOVASCULAR SCREENING; LDL GOAL LESS THAN 160     Hypertension goal BP (blood pressure) < 140/90      Intermittent asthma     Overweight (BMI 25.0-29.9)     MRSA (methicillin resistant staph aureus) culture positive     Lump of axilla     Weight loss     Diverticulosis of large intestine     External hemorrhoids     Abdominal pain, generalized     Intermittent asthma, uncomplicated     Oral aphthae     Poor dentition     Tongue lesion     Snoring     Obstructive sleep apnea syndrome     Body mass index 30.0-30.9, adult     Cognitive developmental delay     Past Surgical History:   Procedure Laterality Date     HERNIA REPAIR, INGUINAL RT/LT       HERNIA REPAIR, INGUINAL RT/LT         Social History   Substance Use Topics     Smoking status: Never Smoker     Smokeless tobacco: Never Used      Comment: second hand at work     Alcohol use No     Family History   Problem Relation Age of Onset     C.A.D. Mother      Hypertension Mother      ?     HEART DISEASE Mother      Mental Illness Mother      Depression Mother      C.A.D. Father      Diabetes Father      Cerebrovascular Disease Father      Hypertension Father      ?     Cancer - colorectal Father      Sleep Apnea Father          Current Outpatient Prescriptions   Medication Sig Dispense Refill     ibuprofen (ADVIL,MOTRIN) 200 MG tablet Take 1-2 tablets (200-400 mg) by mouth every 8 hours as needed for mild pain       lisinopril (PRINIVIL/ZESTRIL) 10 MG tablet Take 1 tablet (10 mg) by mouth daily 30 tablet 1     venlafaxine (EFFEXOR-XR) 75 MG 24 hr capsule TAKE THREE CAPSULES BY MOUTH EVERY DAY 90 capsule 11     Allergies   Allergen Reactions     Latex Rash     Recent Labs   Lab Test  08/14/18   1718  02/02/17   1141  12/02/13   1534  11/09/11   0742   LDL   --    --   103   --    HDL   --    --   36*   --    TRIG   --    --   252*   --    ALT  30  26  40   --    CR  1.01  0.94  0.86   --    GFRESTIMATED  79  86  >90   --    GFRESTBLACK  >90  >90  African American GFR Calc    >90   --    POTASSIUM  4.5  3.8  4.3   --    TSH   --    --    --   1.16      BP Readings  from Last 3 Encounters:   09/06/18 128/88   08/29/18 (!) 129/92   08/14/18 (!) 139/103    Wt Readings from Last 3 Encounters:   09/06/18 197 lb (89.4 kg)   08/29/18 196 lb (88.9 kg)   08/14/18 198 lb 12 oz (90.2 kg)               Hemoglobin   Date Value Ref Range Status   12/02/2013 14.3 13.3 - 17.7 g/dL Final   11/07/2011 15.3 13.3 - 17.7 g/dL Final   07/06/2009 14.4 13.3 - 17.7 g/dL Final   03/10/2009 14.4 13.3 - 17.7 g/dL Final        Health Maintenance Due   Topic Date Due     HIV SCREEN (SYSTEM ASSIGNED)  01/28/1989     DEPRESSION ACTION PLAN Q1 YR  12/22/2017     INFLUENZA VACCINE (1) 09/01/2018     ASTHMA ACTION PLAN Q1 YR  09/28/2018        Reviewed and updated as needed this visit by clinical staff       Reviewed and updated as needed this visit by Provider         ROS:  Constitutional, HEENT, cardiovascular, pulmonary, GI, , musculoskeletal, neuro, skin, endocrine and psych systems are negative, except as otherwise noted.    OBJECTIVE:     /88 (BP Location: Right arm, Patient Position: Chair, Cuff Size: Adult Large)  Pulse 101  Temp 98.4  F (36.9  C) (Oral)  Resp 13  Wt 197 lb (89.4 kg)  SpO2 97%  BMI 30.85 kg/m2  Body mass index is 30.85 kg/(m^2).  GENERAL: healthy, alert and no distress  NECK: no adenopathy, no asymmetry, masses, or scars and thyroid normal to palpation  RESP: lungs clear to auscultation - no rales, rhonchi or wheezes  CV: regular rate and rhythm, normal S1 S2, no S3 or S4, no murmur, click or rub, no peripheral edema and peripheral pulses strong  ABDOMEN: soft, nontender, no hepatosplenomegaly, no masses and bowel sounds normal  RECTAL (male): normal sphincter tone, no rectal masses, multiple external, nonbleeding hemorrhoids and several small superficial nonbleeding anal fissures/erosions noted 2 o'clock  MS: no gross musculoskeletal defects noted, no edema  BACK: no CVA tenderness, no paralumbar tenderness    Diagnostic Test Results:  Results for orders placed or  performed in visit on 09/06/18 (from the past 24 hour(s))   CBC with platelets differential   Result Value Ref Range    WBC 6.2 4.0 - 11.0 10e9/L    RBC Count 5.41 4.4 - 5.9 10e12/L    Hemoglobin 14.7 13.3 - 17.7 g/dL    Hematocrit 45.2 40.0 - 53.0 %    MCV 84 78 - 100 fl    MCH 27.2 26.5 - 33.0 pg    MCHC 32.5 31.5 - 36.5 g/dL    RDW 16.0 (H) 10.0 - 15.0 %    Platelet Count 236 150 - 450 10e9/L    Diff Method Automated Method     % Neutrophils 63.4 %    % Lymphocytes 25.7 %    % Monocytes 8.5 %    % Eosinophils 2.1 %    % Basophils 0.3 %    Absolute Neutrophil 3.9 1.6 - 8.3 10e9/L    Absolute Lymphocytes 1.6 0.8 - 5.3 10e9/L    Absolute Monocytes 0.5 0.0 - 1.3 10e9/L    Absolute Eosinophils 0.1 0.0 - 0.7 10e9/L    Absolute Basophils 0.0 0.0 - 0.2 10e9/L   Hemoglobin A1c   Result Value Ref Range    Hemoglobin A1C 4.9 0 - 5.6 %       ASSESSMENT/PLAN:       1. Hypertension goal BP (blood pressure) < 140/90  BP Readings from Last 3 Encounters:   09/06/18 128/88   08/29/18 (!) 129/92   08/14/18 (!) 139/103    at goal today! Refilled meds for one year  - lisinopril (PRINIVIL/ZESTRIL) 10 MG tablet; Take 1 tablet (10 mg) by mouth daily  Dispense: 90 tablet; Refill: 3    2. Moderate recurrent major depression (H)  At goal  - DEPRESSION ACTION PLAN (DAP)    3. Generalized anxiety disorder  At goal, controlled    4. Internal hemorrhoids  Acute exaccerbation with patient reporting significant bleeding, none noted today and hg stable  Recommend zinc oxide, follow up with colorectal surgeon for hemorrhoid treatment  Hemoglobin   Date Value Ref Range Status   09/06/2018 14.7 13.3 - 17.7 g/dL Final   12/02/2013 14.3 13.3 - 17.7 g/dL Final   11/07/2011 15.3 13.3 - 17.7 g/dL Final   07/06/2009 14.4 13.3 - 17.7 g/dL Final     - CBC with platelets differential  - COLORECTAL SURGERY REFERRAL    5. Recurrent major depressive disorder, in partial remission (H)    - venlafaxine (EFFEXOR-XR) 75 MG 24 hr capsule; TAKE THREE CAPSULES BY  MOUTH EVERY DAY  Dispense: 270 capsule; Refill: 3    6. Mild intermittent asthma without complication  stable  - Asthma Action Plan (AAP)    7. Screening for diabetes mellitus  Normal today  - Hemoglobin A1c    8. Anal fissure  See above  - zinc oxide ([employer] ZINC OXIDE) 20 % ointment; Apply topically as needed for dry skin or irritation  Dispense: 30 g; Refill: 3      Norma Servin MD  [facility]

## 2018-09-06 NOTE — MR AVS SNAPSHOT
After Visit Summary   9/6/2018    Candelario Evangelista    MRN: [id number]           Patient Information     Date Of Birth          1970        Visit Information        Provider Department      9/6/2018 11:40 AM Norma Servin MD [facility]        Today's Diagnoses     Hypertension goal BP (blood pressure) < 140/90    -  1    Moderate recurrent major depression (H)        Generalized anxiety disorder        Internal hemorrhoids        Recurrent major depressive disorder, in partial remission (H)        Mild intermittent asthma without complication        Screening for diabetes mellitus        Anal fissure          Care Instructions    Use zinc oxide to protect your skin. Use after every time you go to the bathroom.          Follow-ups after your visit        Additional Services     COLORECTAL SURGERY REFERRAL       Your provider has referred you to: [facility]: Colon and Rectal [facility] [phone]   [email or web address]  [facility]: [place] [facility] [phone]   [email or web address]    Referral Reason(s): Hemorrhoids and Rectal Bleeding  Special Concerns: None  This referral is: Elective (week +)  It is OK to leave a message on patient's voicemail.    Please be aware that coverage of these services is subject to the terms and limitations of your health insurance plan.  Call member services at your health plan with any benefit or coverage questions.      Please bring the following with you to your appointment:    (1) Any X-Rays, CTs or MRIs which have been performed.  Contact the facility where they were done to arrange for  prior to your scheduled appointment.    (2) List of current medications  (3) This referral request   (4) Any documents/labs given to you for this referral                  Your next 10 appointments already scheduled     Oct 17, 2018  10:00 AM CDT   Return Sleep Patient with Bennett Ezra Goltz, PA-C   [facility] ([facility])    [address] [address] [phone]              Who to contact     If you have questions or need follow up information about today's clinic visit or your schedule please contact [facility] directly at [phone].  Normal or non-critical lab and imaging results will be communicated to you by MyChart, letter or phone within 4 business days after the clinic has received the results. If you do not hear from us within 7 days, please contact the clinic through MyChart or phone. If you have a critical or abnormal lab result, we will notify you by phone as soon as possible.  Submit refill requests through [employer] or call your pharmacy and they will forward the refill request to us. Please allow 3 business days for your refill to be completed.          Additional Information About Your Visit        Care EveryWhere ID     This is your Care EveryWhere ID. This could be used by other organizations to access your [facility] medical records  [id number]        Your Vitals Were     Pulse Temperature Respirations Pulse Oximetry BMI (Body Mass Index)       101 98.4  F (36.9  C) (Oral) 13 97% 30.85 kg/m2        Blood Pressure from Last 3 Encounters:   09/06/18 128/88   08/29/18 (!) 129/92   08/14/18 (!) 139/103    Weight from Last 3 Encounters:   09/06/18 197 lb (89.4 kg)   08/29/18 196 lb (88.9 kg)   08/14/18 198 lb 12 oz (90.2 kg)              We Performed the Following     Asthma Action Plan (AAP)     CBC with platelets differential     COLORECTAL SURGERY REFERRAL     DEPRESSION ACTION PLAN (DAP)     Hemoglobin A1c          Today's Medication Changes          These changes are accurate as of 9/6/18  1:25 PM.  If you have any questions, ask your nurse or doctor.               Start taking these medicines.        Dose/Directions    zinc oxide 20 %  ointment   Commonly known as:  [employer] ZINC OXIDE   Used for:  Anal fissure   Started by:  Norma Servin MD        Apply topically as needed for dry skin or irritation   Quantity:  30 g   Refills:  3            Where to get your medicines      These medications were sent to [facility] - [place], [place] - [address]  [address], [facility] [address]     Phone:  [phone]     lisinopril 10 MG tablet    venlafaxine 75 MG 24 hr capsule    zinc oxide 20 % ointment                Primary Care Provider Office Phone # Fax #    Nroma Servin [phone]       [address]  [facility] [address]        Equal Access to Services     LYUBOV STANFORD : Hadii gregory rivers hadasho Soomaali, waaxda luqadaha, qaybta kaalmada adeegyada, waxay veronicain chayito thrasher . So [facility] [phone].    ATENCIÓN: Si habla español, tiene a duarte disposición servicios gratuitos de asistencia lingüística. [facility] [phone].    We comply with applicable federal civil rights laws and [place] laws. We do not discriminate on the basis of race, color, national origin, age, disability, sex, sexual orientation, or gender identity.            Thank you!     Thank you for choosing [facility]  for your care. Our goal is always to provide you with excellent care. Hearing back from our patients is one way we can continue to improve our services. Please take a few minutes to complete the written survey that you may receive in the mail after your visit with us. Thank you!             Your Updated Medication List - Protect others around you: Learn how to safely use, store and throw away your medicines at www.disposemymeds.org.          This list is accurate as of 9/6/18  1:25 PM.  Always use your most recent med list.                   Brand Name Dispense Instructions for use Diagnosis    ibuprofen 200 MG tablet    ADVIL/MOTRIN     Take 1-2 tablets (200-400 mg)  by mouth every 8 hours as needed for mild pain        lisinopril 10 MG tablet    PRINIVIL/ZESTRIL    90 tablet    Take 1 tablet (10 mg) by mouth daily    Hypertension goal BP (blood pressure) < 140/90       venlafaxine 75 MG 24 hr capsule    EFFEXOR-XR    270 capsule    TAKE THREE CAPSULES BY MOUTH EVERY DAY    Recurrent major depressive disorder, in partial remission (H)       zinc oxide 20 % ointment    CVS ZINC OXIDE    30 g    Apply topically as needed for dry skin or irritation    Anal fissure

## 2018-09-07 ASSESSMENT — ANXIETY QUESTIONNAIRES: GAD7 TOTAL SCORE: 5

## 2018-09-07 ASSESSMENT — PATIENT HEALTH QUESTIONNAIRE - PHQ9: SUM OF ALL RESPONSES TO PHQ QUESTIONS 1-9: 1

## 2018-10-04 ENCOUNTER — TELEPHONE (OUTPATIENT)
Dept: SURGERY | Facility: CLINIC | Age: 48
End: 2018-10-04

## 2018-10-04 NOTE — TELEPHONE ENCOUNTER
[facility] Call Center    Phone Message    May a detailed message be left on voicemail: yes    Reason for Call: Other: New Hemorrhoids and rectal bleeding, schedule 11/7 , pt would like to be seen sooner due to bleeding.     Action Taken: Message routed to:  [facility] [place] [facility]

## 2018-10-05 NOTE — TELEPHONE ENCOUNTER
Left message for patient regarding below. Patient to call back to discuss symptoms. Left direct number for pt to return call.    Bonnie CARROLL LPN

## 2018-10-17 ENCOUNTER — OFFICE VISIT (OUTPATIENT)
Dept: SLEEP MEDICINE | Facility: CLINIC | Age: 48
End: 2018-10-17
Payer: COMMERCIAL

## 2018-10-17 VITALS
RESPIRATION RATE: 16 BRPM | TEMPERATURE: 98.5 F | WEIGHT: 192 LBS | HEART RATE: 83 BPM | BODY MASS INDEX: 30.13 KG/M2 | DIASTOLIC BLOOD PRESSURE: 72 MMHG | SYSTOLIC BLOOD PRESSURE: 112 MMHG | OXYGEN SATURATION: 93 % | HEIGHT: 67 IN

## 2018-10-17 DIAGNOSIS — G47.33 OBSTRUCTIVE SLEEP APNEA SYNDROME: Primary | ICD-10-CM

## 2018-10-17 PROCEDURE — 99214 OFFICE O/P EST MOD 30 MIN: CPT | Performed by: PHYSICIAN ASSISTANT

## 2018-10-17 NOTE — PROGRESS NOTES
Sleep Study Follow-Up Visit:    Date on this visit: 10/17/2018    Candelario Evangelista comes in today for follow-up of his CPAP use for severe GERRY. He was initially seen at the [facility] because his girlfriend has observed snoring for years and pauses in his breathing during sleep just recently. His medical history is significant for anxiety, depression, cognitive developmental delay, asthma, HTN.     His study revealed severe GERRY with an AHI of 80/hr and an O2 karma of 70%. He spent 28.1 minutes below 89%. CPAP was titrated to 11 cm. His residual AHI was 11/hr (1/3 of events were centrals).   Interestingly, he had 78.7% of the CPAP titration in REM sleep, suggesting significant REM rebound.     He is on auto CPAP 9-14 cm. He uses a full face mask. He continues to fall asleep before putting the mask on. He can fall asleep by 8 PM and wake around 1 AM to put the mask on. He is not aware of snoring with the mask. He did get a different size cushion and it seems to fit better. He occasionally notice mask leak. He always has dry mouth, even in the day. He does not use the humidifier.     The compliance data shows that he has used the CPAP for 30/30 nights, 96.7% of nights for >4 hours.  The 90th% pressure is 11.7 cm.  The average time in large leak is 14 min, most of which occurred on just a couple of nights.  The average nightly usage is 7:24.  The average AHI is 1.7/hr. The snore index is 16.8. His download shows 4-6 hours of sleep on most weeknights. He has had about 5 days in the last 2 weeks with 12-15 hours of use. That was after having dental surgery.       Past medical/surgical history, family history, social history, medications and allergies were reviewed.      Problem List:  Patient Active Problem List    Diagnosis Date Noted     Mild intermittent asthma without complication 09/06/2018     Priority: Medium     Snoring 05/31/2018     Priority: Medium     Obstructive sleep apnea syndrome 05/31/2018      Priority: Medium     6/27/2018 [facility] Sleep Study (193.0 lbs) - AHI 80.0, RDI 84.3, Supine AHI 83.2, REM AHI -, Low O2% 70.1%, Time Spent ?88% 28.1, Time Spent ?89% 34.0. Treatment was titrated to a pressure of CPAP 11 with an AHI 11.0. Time spent in REM supine at this pressure was 65.5 minutes.       Body mass index 30.0-30.9, adult 05/31/2018     Priority: Medium     Cognitive developmental delay 05/31/2018     Priority: Medium     Tongue lesion 11/28/2017     Priority: Medium     Oral aphthae 09/28/2017     Priority: Medium     Poor dentition 09/28/2017     Priority: Medium     Abdominal pain, generalized 05/27/2014     Priority: Medium     Weight loss 12/02/2013     Priority: Medium     Diverticulosis of large intestine 12/02/2013     Priority: Medium     Problem list name updated by automated process. Provider to review       External hemorrhoids 12/02/2013     Priority: Medium     Lump of axilla 06/15/2011     Priority: Medium     MRSA (methicillin resistant staph aureus) culture positive 05/09/2011     Priority: Medium     Overweight (BMI 25.0-29.9) 03/28/2011     Priority: Medium     Intermittent asthma 02/15/2011     Priority: Medium     Hypertension goal BP (blood pressure) < 140/90 02/08/2011     Priority: Medium     CARDIOVASCULAR SCREENING; LDL GOAL LESS THAN 160 05/09/2010     Priority: Medium     Blood in stool 12/15/2009     Priority: Medium     Internal hemorrhoids 11/16/2009     Priority: Medium     Moderate recurrent major depression (H) 08/31/2007     Priority: Medium     Generalized anxiety disorder 08/31/2007     Priority: Medium        Impression/Plan:    (G47.33) Obstructive sleep apnea syndrome  (primary encounter diagnosis)  Comment: Leak is improved for the most part with the new mask. His use is low on some nights because he falls asleep without it.   Plan: Continue auto CPAP 9-14 cm. Try setting an alarm in the evening as a reminder to get the mask on.     He will follow up  with me in about 1 year(s).     Twenty-five minutes spent with patient, all of which were spent face-to-face counseling, consulting, coordinating plan of care.      Bennett Goltz, PA-C    CC: No ref. provider found

## 2018-10-17 NOTE — MR AVS SNAPSHOT
After Visit Summary   10/17/2018    Candelario Evangeilsta    MRN: [id number]           Patient Information     Date Of Birth          1970        Visit Information        Provider Department      10/17/2018 10:00 AM Goltz, Bennett Ezra, PA-C [facility]        Today's Diagnoses     Obstructive sleep apnea syndrome    -  1      Care Instructions    Set an alarm in the evening at your bedtime (I recommend 10 PM) to wake you to put the CPAP mask on in case you fall asleep without it.     Your BMI is Body mass index is 30.07 kg/(m^2).  Weight management is a personal decision.  If you are interested in exploring weight loss strategies, the following discussion covers the approaches that may be successful. Body mass index (BMI) is one way to tell whether you are at a healthy weight, overweight, or obese. It measures your weight in relation to your height.  A BMI of 18.5 to 24.9 is in the healthy range. A person with a BMI of 25 to 29.9 is considered overweight, and someone with a BMI of 30 or greater is considered obese. More than two-thirds of American adults are considered overweight or obese.  Being overweight or obese increases the risk for further weight gain. Excess weight may lead to heart disease and diabetes.  Creating and following plans for healthy eating and physical activity may help you improve your health.  Weight control is part of healthy lifestyle and includes exercise, emotional health, and healthy eating habits. Careful eating habits lifelong are the mainstay of weight control. Though there are significant health benefits from weight loss, long-term weight loss with diet alone may be very difficult to achieve- studies show long-term success with dietary management in less than 10% of people. Attaining a healthy weight may be especially difficult to achieve in those with severe obesity. In some cases, medications, devices and surgical management might be considered.  What  can you do?  If you are overweight or obese and are interested in methods for weight loss, you should discuss this with your provider.     Consider reducing daily calorie intake by 500 calories.     Keep a food journal.     Avoiding skipping meals, consider cutting portions instead.    Diet combined with exercise helps maintain muscle while optimizing fat loss. Strength training is particularly important for building and maintaining muscle mass. Exercise helps reduce stress, increase energy, and improves fitness. Increasing exercise without diet control, however, may not burn enough calories to loose weight.       Start walking three days a week 10-20 minutes at a time    Work towards walking thirty minutes five days a week     Eventually, increase the speed of your walking for 1-2 minutes at time    In addition, we recommend that you review healthy lifestyles and methods for weight loss available through the National Institutes of Health patient information sites:  http://win.niddk.nih.gov/publications/index.htm    And look into health and wellness programs that may be available through your health insurance provider, employer, local community center, or imtiaz club.    Weight management plan: Patient was referred to their PCP to discuss a diet and exercise plan.              Follow-ups after your visit        Follow-up notes from your care team     Return in about 1 year (around 10/17/2019) for CPAP compliance recheck.      Your next 10 appointments already scheduled     Nov 07, 2018  1:00 PM CST   (Arrive by 12:45 PM)   New Patient Visit with JACINTO Mtz [facility] Colon and Rectal Surgery ([facility])    [address] [place] [id number] [phone]              Who to contact     If you have questions or need follow up information about today's clinic visit or your schedule please contact [facility] directly at  "[phone].  Normal or non-critical lab and imaging results will be communicated to you by MyChart, letter or phone within 4 business days after the clinic has received the results. If you do not hear from us within 7 days, please contact the clinic through MyChart or phone. If you have a critical or abnormal lab result, we will notify you by phone as soon as possible.  Submit refill requests through [employer] or call your pharmacy and they will forward the refill request to us. Please allow 3 business days for your refill to be completed.          Additional Information About Your Visit        Care EveryWhere ID     This is your Care EveryWhere ID. This could be used by other organizations to access your [place] medical records  [id number]        Your Vitals Were     Pulse Temperature Respirations Height Pulse Oximetry BMI (Body Mass Index)    83 98.5  F (36.9  C) (Oral) 16 1.702 m (5' 7\") 93% 30.07 kg/m2       Blood Pressure from Last 3 Encounters:   10/17/18 112/72   09/06/18 128/88   08/29/18 (!) 129/92    Weight from Last 3 Encounters:   10/17/18 87.1 kg (192 lb)   09/06/18 89.4 kg (197 lb)   08/29/18 88.9 kg (196 lb)              Today, you had the following     No orders found for display       Primary Care Provider Office Phone # Fax #    Norma Servin [phone]       [address]  [facility] [address]        Equal Access to Services     LYUBOV STANFORD : Hadii aad ku hadasho Soomaali, waaxda luqadaha, qaybta kaalmada adeegyada, frida thrasher . So [facility] [phone].    ATENCIÓN: Si habla español, tiene a duarte disposición servicios gratuitos de asistencia lingüística. Llame al [phone].    We comply with applicable federal civil rights laws and [place] laws. We do not discriminate on the basis of race, color, national origin, age, disability, sex, sexual orientation, or gender identity.            Thank you!     Thank you for choosing [facility]  for your care. Our goal is always to provide you with excellent care. Hearing back from our patients is one way we can continue to improve our services. Please take a few minutes to complete the written survey that you may receive in the mail after your visit with us. Thank you!             Your Updated Medication List - Protect others around you: Learn how to safely use, store and throw away your medicines at www.disposemymeds.org.          This list is accurate as of 10/17/18 10:27 AM.  Always use your most recent med list.                   Brand Name Dispense Instructions for use Diagnosis    ibuprofen 200 MG tablet    ADVIL/MOTRIN     Take 1-2 tablets (200-400 mg) by mouth every 8 hours as needed for mild pain        lisinopril 10 MG tablet    PRINIVIL/ZESTRIL    90 tablet    Take 1 tablet (10 mg) by mouth daily    Hypertension goal BP (blood pressure) < 140/90       venlafaxine 75 MG 24 hr capsule    EFFEXOR-XR    270 capsule    TAKE THREE CAPSULES BY MOUTH EVERY DAY    Recurrent major depressive disorder, in partial remission (H)       zinc oxide 20 % ointment    [employer] ZINC OXIDE    30 g    Apply topically as needed for dry skin or irritation    Anal fissure

## 2018-10-17 NOTE — PATIENT INSTRUCTIONS
Set an alarm in the evening at your bedtime (I recommend 10 PM) to wake you to put the CPAP mask on in case you fall asleep without it.     Your BMI is Body mass index is 30.07 kg/(m^2).  Weight management is a personal decision.  If you are interested in exploring weight loss strategies, the following discussion covers the approaches that may be successful. Body mass index (BMI) is one way to tell whether you are at a healthy weight, overweight, or obese. It measures your weight in relation to your height.  A BMI of 18.5 to 24.9 is in the healthy range. A person with a BMI of 25 to 29.9 is considered overweight, and someone with a BMI of 30 or greater is considered obese. More than two-thirds of American adults are considered overweight or obese.  Being overweight or obese increases the risk for further weight gain. Excess weight may lead to heart disease and diabetes.  Creating and following plans for healthy eating and physical activity may help you improve your health.  Weight control is part of healthy lifestyle and includes exercise, emotional health, and healthy eating habits. Careful eating habits lifelong are the mainstay of weight control. Though there are significant health benefits from weight loss, long-term weight loss with diet alone may be very difficult to achieve- studies show long-term success with dietary management in less than 10% of people. Attaining a healthy weight may be especially difficult to achieve in those with severe obesity. In some cases, medications, devices and surgical management might be considered.  What can you do?  If you are overweight or obese and are interested in methods for weight loss, you should discuss this with your provider.     Consider reducing daily calorie intake by 500 calories.     Keep a food journal.     Avoiding skipping meals, consider cutting portions instead.    Diet combined with exercise helps maintain muscle while optimizing fat loss. Strength  training is particularly important for building and maintaining muscle mass. Exercise helps reduce stress, increase energy, and improves fitness. Increasing exercise without diet control, however, may not burn enough calories to loose weight.       Start walking three days a week 10-20 minutes at a time    Work towards walking thirty minutes five days a week     Eventually, increase the speed of your walking for 1-2 minutes at time    In addition, we recommend that you review healthy lifestyles and methods for weight loss available through the National Institutes of Health patient information sites:  http://win.niddk.nih.gov/publications/index.htm    And look into health and wellness programs that may be available through your health insurance provider, employer, local community center, or imtiaz club.    Weight management plan: Patient was referred to their PCP to discuss a diet and exercise plan.

## 2018-10-17 NOTE — NURSING NOTE
"Chief Complaint   Patient presents with     CPAP Follow Up     Follow up michelle        Initial /72  Pulse 83  Temp 98.5  F (36.9  C) (Oral)  Resp 16  Ht 1.702 m (5' 7\")  Wt 87.1 kg (192 lb)  SpO2 93%  BMI 30.07 kg/m2 Estimated body mass index is 30.07 kg/(m^2) as calculated from the following:    Height as of this encounter: 1.702 m (5' 7\").    Weight as of this encounter: 87.1 kg (192 lb).    Medication Reconciliation: complete    ESS 7    Aletha Barraza MA      "

## 2018-11-02 NOTE — TELEPHONE ENCOUNTER
FUTURE VISIT INFORMATION      FUTURE VISIT INFORMATION:    Date: 11/02/2018    Time: 01:00 PM     Location: [facility] [place]  REFERRAL INFORMATION:    Referring provider:  Norma Servin     Referring providers clinic:   Family Practice     Reason for visit/diagnosis  Internal Hemorrhoids     -Called PT, LVM; To see if patient has been see elsewhere. No further action taken.

## 2018-11-07 ENCOUNTER — OFFICE VISIT (OUTPATIENT)
Dept: SURGERY | Facility: CLINIC | Age: 48
End: 2018-11-07
Payer: COMMERCIAL

## 2018-11-07 ENCOUNTER — PRE VISIT (OUTPATIENT)
Dept: SURGERY | Facility: CLINIC | Age: 48
End: 2018-11-07

## 2018-11-07 VITALS
WEIGHT: 191.3 LBS | HEART RATE: 104 BPM | DIASTOLIC BLOOD PRESSURE: 84 MMHG | OXYGEN SATURATION: 97 % | TEMPERATURE: 97.9 F | BODY MASS INDEX: 30.02 KG/M2 | SYSTOLIC BLOOD PRESSURE: 125 MMHG | HEIGHT: 67 IN

## 2018-11-07 DIAGNOSIS — K59.09 OTHER CONSTIPATION: ICD-10-CM

## 2018-11-07 DIAGNOSIS — K64.8 INTERNAL HEMORRHOIDS: ICD-10-CM

## 2018-11-07 DIAGNOSIS — K62.5 HEMORRHAGE OF RECTUM AND ANUS: Primary | ICD-10-CM

## 2018-11-07 ASSESSMENT — PAIN SCALES - GENERAL: PAINLEVEL: NO PAIN (0)

## 2018-11-07 NOTE — PROGRESS NOTES
Colon and Rectal Surgery Consult Clinic Note    Date: 2018     Referring provider:  Norma Servin MD  [id number] [place] [id number]     RE: Candelario Evangelista  : 1970  JUVENAL: 2018    Candelario Evangelista is a very pleasant 48 year old male with no significant past medical history with a recent diagnosis of rectal bleeding.  Given these findings they were subsequently sent to the Colon and Rectal Surgery Clinic for an opinion on this and a new patient consultation.     Candelario has been having rectal bleeding for the past few years.  This is typically bright red but is occasionally a dark red blood.  He occasionally notices some tissue along with the bleeding when passing bowel movements.  In between bowel movements he loses a white drainage that he thinks is either pus or mucus.  He denies any significant pain but occasionally gets some discomfort with sitting.  He denies any abdominal pain, nausea, vomiting, or unexplained weight loss.  His bowel movements are irregular and anywhere from hard to soft.  He has not noticed any change in these.  He is not on any blood thinners.  He reportedly had a colonoscopy 8-9 years ago, which was normal.  His father did have colon cancer in his 60s.  No other family history of any cancers.    Assessment/Plan: 48 year old male with rectal bleeding.  Hemoglobin stable at 14.7.  He does have large internal hemorrhoids.  However, given his family history of colon cancer and his ongoing rectal bleeding, I would recommend a full colonoscopy to rule out any malignant sources of bleeding prior to treating hemorrhoids.  Recommended in the meantime that he start on a daily fiber supplement.  We discussed management of hemorrhoids today.  These are fairly large and we discussed surgical hemorrhoidectomy.  However, Candelario reports that he does not have enough time off of work to have any surgery.  We certainly could try hemorrhoid banding but I discussed that he would  likely need to come back multiple times for repeat banding given the size of his hemorrhoids and that if this is not effective that we may need to consider surgical hemorrhoidectomy if he continues to be symptomatic.  We will have him return after his colonoscopy to discuss further. Patient's questions were answered to his stated satisfaction and he is in agreement with this plan.    Medical history:  Past Medical History:   Diagnosis Date     Anxiety      Depressive disorder      GENERALIZED ANXIETY DIS 8/31/2007     GERD (gastroesophageal reflux disease)      Head injury      Hypertension      Intermittent asthma 2/15/2011     Moderate recurrent major depression (H) 8/31/2007    Sees a Psychiatrist. Does not want to fill out a PHQ - 9     Problem, psychiatric        Surgical history:  Past Surgical History:   Procedure Laterality Date     HERNIA REPAIR, INGUINAL RT/LT       HERNIA REPAIR, INGUINAL RT/LT         Problem list:    Patient Active Problem List    Diagnosis Date Noted     Mild intermittent asthma without complication 09/06/2018     Priority: Medium     Snoring 05/31/2018     Priority: Medium     Obstructive sleep apnea syndrome 05/31/2018     Priority: Medium     6/27/2018 [facility] Sleep Study (193.0 lbs) - AHI 80.0, RDI 84.3, Supine AHI 83.2, REM AHI -, Low O2% 70.1%, Time Spent ?88% 28.1, Time Spent ?89% 34.0. Treatment was titrated to a pressure of CPAP 11 with an AHI 11.0. Time spent in REM supine at this pressure was 65.5 minutes.       Body mass index 30.0-30.9, adult 05/31/2018     Priority: Medium     Cognitive developmental delay 05/31/2018     Priority: Medium     Tongue lesion 11/28/2017     Priority: Medium     Oral aphthae 09/28/2017     Priority: Medium     Poor dentition 09/28/2017     Priority: Medium     Abdominal pain, generalized 05/27/2014     Priority: Medium     Weight loss 12/02/2013     Priority: Medium     Diverticulosis of large intestine 12/02/2013     Priority: Medium      Problem list name updated by automated process. Provider to review       External hemorrhoids 12/02/2013     Priority: Medium     Lump of axilla 06/15/2011     Priority: Medium     MRSA (methicillin resistant staph aureus) culture positive 05/09/2011     Priority: Medium     Overweight (BMI 25.0-29.9) 03/28/2011     Priority: Medium     Intermittent asthma 02/15/2011     Priority: Medium     Hypertension goal BP (blood pressure) < 140/90 02/08/2011     Priority: Medium     CARDIOVASCULAR SCREENING; LDL GOAL LESS THAN 160 05/09/2010     Priority: Medium     Blood in stool 12/15/2009     Priority: Medium     Internal hemorrhoids 11/16/2009     Priority: Medium     Moderate recurrent major depression (H) 08/31/2007     Priority: Medium     Generalized anxiety disorder 08/31/2007     Priority: Medium       Medications:  Current Outpatient Prescriptions   Medication Sig Dispense Refill     ibuprofen (ADVIL,MOTRIN) 200 MG tablet Take 1-2 tablets (200-400 mg) by mouth every 8 hours as needed for mild pain       lisinopril (PRINIVIL/ZESTRIL) 10 MG tablet Take 1 tablet (10 mg) by mouth daily 90 tablet 3     venlafaxine (EFFEXOR-XR) 75 MG 24 hr capsule TAKE THREE CAPSULES BY MOUTH EVERY  capsule 3     zinc oxide ([employer] ZINC OXIDE) 20 % ointment Apply topically as needed for dry skin or irritation (Patient not taking: Reported on 11/7/2018) 30 g 3       Allergies:  Allergies   Allergen Reactions     Latex Rash       Family history:  Family History   Problem Relation Age of Onset     C.A.D. Mother      Hypertension Mother      ?     HEART DISEASE Mother      Mental Illness Mother      Depression Mother      C.A.D. Father      Diabetes Father      Cerebrovascular Disease Father      Hypertension Father      ?     Cancer - colorectal Father      Sleep Apnea Father        Social history:  Social History   Substance Use Topics     Smoking status: Never Smoker     Smokeless tobacco: Never Used      Comment: second hand at  "work     Alcohol use No    Marital status: single.  Occupation: cleans slot machines    Nursing Notes:   Celia Hdez CMA  11/7/2018  1:08 PM  Signed  Chief Complaint   Patient presents with     Rectal Problem     Hemorrhoids and bleeding.     Referral     Per Dr. Servin.       Vitals:    11/07/18 1305   BP: 125/84   Pulse: 104   Temp: 97.9  F (36.6  C)   TempSrc: Oral   SpO2: 97%   Weight: 191 lb 4.8 oz   Height: 5' 7\"       Body mass index is 29.96 kg/(m^2).      JOCELYN Bloom                         Physical Examination: Exam was chaperoned by JOCELYN Bloom   /84  Pulse 104  Temp 97.9  F (36.6  C) (Oral)  Ht 5' 7\"  Wt 191 lb 4.8 oz  SpO2 97%  BMI 29.96 kg/m2  General: alert, oriented, in no acute distress, sitting comfortably  HEENT: mucous membranes moist  Perianal external examination:  Perianal skin: Intact with no excoriation or lichenification.  Lesions: No.  Eversion of buttocks: There was not evidence of an anal fissure. Details: N/A.  Skin tags or external hemorrhoids: None.  Digital rectal examination: Was performed.   Sphincter tone: Good.  Palpable lesions: No.  Prostate: Normal.  Other: None.    Anoscopy: Was performed.   Hemorrhoids: Yes.  Grade 3 internal hemorrhoids circumferentially without any active bleeding.  Lesions: No    Total face to face time was 30 minutes, >50% counseling.    JACINTO Robertson, NP-C  Colon and Rectal Surgery   [facility]    This note was created using speech recognition software and may contain unintended word substitutions.    "

## 2018-11-07 NOTE — PATIENT INSTRUCTIONS
1. Start a daily fiber supplement such as Citrucel or Metamucil powder. Start with once a day and slowly increase up to three times a day, if needed, over the next 4-6 weeks  2. Schedule colonoscopy. Call [facility] ([facility]) at [phone]  3. Return to clinic after colonoscopy to discuss hemorrhoid banding (done in clinic) versus surgery for hemorrhoids

## 2018-11-07 NOTE — NURSING NOTE
"Chief Complaint   Patient presents with     Rectal Problem     Hemorrhoids and bleeding.     Referral     Per Dr. Servin.       Vitals:    11/07/18 1305   BP: 125/84   Pulse: 104   Temp: 97.9  F (36.6  C)   TempSrc: Oral   SpO2: 97%   Weight: 191 lb 4.8 oz   Height: 5' 7\"       Body mass index is 29.96 kg/(m^2).      Celia Hdez, EMT                      "

## 2018-11-07 NOTE — LETTER
2018      RE: Candelario Evangelista  [address]  [address]  [facility] [address]       Colon and Rectal Surgery Consult Clinic Note    Date: 2018     Referring provider:  Norma Servin MD  [address] [place] [address]     RE: Candelario Evangelista  : 1970  JUVENAL: 2018    Candelario Evangelista is a very pleasant 48 year old male with no significant past medical history with a recent diagnosis of rectal bleeding.  Given these findings they were subsequently sent to the Colon and Rectal Surgery Clinic for an opinion on this and a new patient consultation.     Candelario has been having rectal bleeding for the past few years.  This is typically bright red but is occasionally a dark red blood.  He occasionally notices some tissue along with the bleeding when passing bowel movements.  In between bowel movements he loses a white drainage that he thinks is either pus or mucus.  He denies any significant pain but occasionally gets some discomfort with sitting.  He denies any abdominal pain, nausea, vomiting, or unexplained weight loss.  His bowel movements are irregular and anywhere from hard to soft.  He has not noticed any change in these.  He is not on any blood thinners.  He reportedly had a colonoscopy 8-9 years ago, which was normal.  His father did have colon cancer in his 60s.  No other family history of any cancers.    Assessment/Plan: 48 year old male with rectal bleeding.  Hemoglobin stable at 14.7.  He does have large internal hemorrhoids.  However, given his family history of colon cancer and his ongoing rectal bleeding, I would recommend a full colonoscopy to rule out any malignant sources of bleeding prior to treating hemorrhoids.  Recommended in the meantime that he start on a daily fiber supplement.  We discussed management of hemorrhoids today.  These are fairly large and we discussed surgical hemorrhoidectomy.  However, Candelario reports that he does not have enough time off of work to  have any surgery.  We certainly could try hemorrhoid banding but I discussed that he would likely need to come back multiple times for repeat banding given the size of his hemorrhoids and that if this is not effective that we may need to consider surgical hemorrhoidectomy if he continues to be symptomatic.  We will have him return after his colonoscopy to discuss further. Patient's questions were answered to his stated satisfaction and he is in agreement with this plan.    Medical history:  Past Medical History:   Diagnosis Date     Anxiety      Depressive disorder      GENERALIZED ANXIETY DIS 8/31/2007     GERD (gastroesophageal reflux disease)      Head injury      Hypertension      Intermittent asthma 2/15/2011     Moderate recurrent major depression (H) 8/31/2007    Sees a Psychiatrist. Does not want to fill out a PHQ - 9     Problem, psychiatric        Surgical history:  Past Surgical History:   Procedure Laterality Date     HERNIA REPAIR, INGUINAL RT/LT       HERNIA REPAIR, INGUINAL RT/LT         Problem list:    Patient Active Problem List    Diagnosis Date Noted     Mild intermittent asthma without complication 09/06/2018     Priority: Medium     Snoring 05/31/2018     Priority: Medium     Obstructive sleep apnea syndrome 05/31/2018     Priority: Medium     6/27/2018 [facility] Sleep Study (193.0 lbs) - AHI 80.0, RDI 84.3, Supine AHI 83.2, REM AHI -, Low O2% 70.1%, Time Spent ?88% 28.1, Time Spent ?89% 34.0. Treatment was titrated to a pressure of CPAP 11 with an AHI 11.0. Time spent in REM supine at this pressure was 65.5 minutes.       Body mass index 30.0-30.9, adult 05/31/2018     Priority: Medium     Cognitive developmental delay 05/31/2018     Priority: Medium     Tongue lesion 11/28/2017     Priority: Medium     Oral aphthae 09/28/2017     Priority: Medium     Poor dentition 09/28/2017     Priority: Medium     Abdominal pain, generalized 05/27/2014     Priority: Medium     Weight loss 12/02/2013      Priority: Medium     Diverticulosis of large intestine 12/02/2013     Priority: Medium     Problem list name updated by automated process. Provider to review       External hemorrhoids 12/02/2013     Priority: Medium     Lump of axilla 06/15/2011     Priority: Medium     MRSA (methicillin resistant staph aureus) culture positive 05/09/2011     Priority: Medium     Overweight (BMI 25.0-29.9) 03/28/2011     Priority: Medium     Intermittent asthma 02/15/2011     Priority: Medium     Hypertension goal BP (blood pressure) < 140/90 02/08/2011     Priority: Medium     CARDIOVASCULAR SCREENING; LDL GOAL LESS THAN 160 05/09/2010     Priority: Medium     Blood in stool 12/15/2009     Priority: Medium     Internal hemorrhoids 11/16/2009     Priority: Medium     Moderate recurrent major depression (H) 08/31/2007     Priority: Medium     Generalized anxiety disorder 08/31/2007     Priority: Medium       Medications:  Current Outpatient Prescriptions   Medication Sig Dispense Refill     ibuprofen (ADVIL,MOTRIN) 200 MG tablet Take 1-2 tablets (200-400 mg) by mouth every 8 hours as needed for mild pain       lisinopril (PRINIVIL/ZESTRIL) 10 MG tablet Take 1 tablet (10 mg) by mouth daily 90 tablet 3     venlafaxine (EFFEXOR-XR) 75 MG 24 hr capsule TAKE THREE CAPSULES BY MOUTH EVERY  capsule 3     zinc oxide ([employer] ZINC OXIDE) 20 % ointment Apply topically as needed for dry skin or irritation (Patient not taking: Reported on 11/7/2018) 30 g 3       Allergies:  Allergies   Allergen Reactions     Latex Rash       Family history:  Family History   Problem Relation Age of Onset     C.A.D. Mother      Hypertension Mother      ?     HEART DISEASE Mother      Mental Illness Mother      Depression Mother      C.A.D. Father      Diabetes Father      Cerebrovascular Disease Father      Hypertension Father      ?     Cancer - colorectal Father      Sleep Apnea Father        Social history:  Social History   Substance Use Topics      "Smoking status: Never Smoker     Smokeless tobacco: Never Used      Comment: second hand at work     Alcohol use No    Marital status: single.  Occupation: cleans slot machines    Nursing Notes:   Celia Hdez CMA  11/7/2018  1:08 PM  Signed  Chief Complaint   Patient presents with     Rectal Problem     Hemorrhoids and bleeding.     Referral     Per Dr. Servin.       Vitals:    11/07/18 1305   BP: 125/84   Pulse: 104   Temp: 97.9  F (36.6  C)   TempSrc: Oral   SpO2: 97%   Weight: 191 lb 4.8 oz   Height: 5' 7\"       Body mass index is 29.96 kg/(m^2).      JOCELYN Bloom                         Physical Examination: Exam was chaperoned by JOCELYN Bloom   /84  Pulse 104  Temp 97.9  F (36.6  C) (Oral)  Ht 5' 7\"  Wt 191 lb 4.8 oz  SpO2 97%  BMI 29.96 kg/m2  General: alert, oriented, in no acute distress, sitting comfortably  HEENT: mucous membranes moist  Perianal external examination:  Perianal skin: Intact with no excoriation or lichenification.  Lesions: No.  Eversion of buttocks: There was not evidence of an anal fissure. Details: N/A.  Skin tags or external hemorrhoids: None.  Digital rectal examination: Was performed.   Sphincter tone: Good.  Palpable lesions: No.  Prostate: Normal.  Other: None.    Anoscopy: Was performed.   Hemorrhoids: Yes.  Grade 3 internal hemorrhoids circumferentially without any active bleeding.  Lesions: No    Total face to face time was 30 minutes, >50% counseling.    JACINTO Baldwin, NP-C  Colon and Rectal Surgery   [facility]    This note was created using speech recognition software and may contain unintended word substitutions.      JACINTO Baldwin CNP      "

## 2018-11-07 NOTE — MR AVS SNAPSHOT
After Visit Summary   11/7/2018    Candelario Evangelista    MRN: [id number]           Patient Information     Date Of Birth          1970        Visit Information        Provider Department      11/7/2018 1:00 PM Rosa Suazo APRN [facility] Colon and Rectal Surgery        Care Instructions    1. Start a daily fiber supplement such as Citrucel or Metamucil powder. Start with once a day and slowly increase up to three times a day, if needed, over the next 4-6 weeks  2. Schedule colonoscopy. Call [facility] ([facility]) at [phone]  3. Return to clinic after colonoscopy to discuss hemorrhoid banding (done in clinic) versus surgery for hemorrhoids            Follow-ups after your visit        Your next 10 appointments already scheduled     Nov 08, 2018 11:00 AM CST   Office Visit with Norma Servin MD   [facility] ([facility]    [address] [place] [address] [phone]           Bring a current list of meds and any records pertaining to this visit. For Physicals, please bring immunization records and any forms needing to be filled out. Please arrive 10 minutes early to complete paperwork.            Oct 23, 2019 10:00 AM CDT   Return Sleep Patient with Bennett Ezra Goltz, PA-C   [facility] ([facility])    [address] [address] [phone]              Who to contact     Please call your clinic at [phone] to:    Ask questions about your health    Make or cancel appointments    Discuss your medicines    Learn about your test results    Speak to your doctor            Additional Information About Your Visit        Care EveryWhere ID     This is your Care EveryWhere ID. This could be used by other organizations to access your [facility] medical records  [id number]        Your Vitals Were     Pulse Temperature Height Pulse Oximetry BMI  "(Body Mass Index)       104 97.9  F (36.6  C) (Oral) 5' 7\" 97% 29.96 kg/m2        Blood Pressure from Last 3 Encounters:   11/07/18 125/84   10/17/18 112/72   09/06/18 128/88    Weight from Last 3 Encounters:   11/07/18 191 lb 4.8 oz   10/17/18 192 lb   09/06/18 197 lb              Today, you had the following     No orders found for display       Primary Care Provider Office Phone # Fax #    Norma Servin [phone] [address]  [facility] [address]        Equal Access to Services     [facility] : Hadii gregory Albarran, waaxda almita, qaybta kaalmada alejandra, frida thrasher . So [facility] [phone].    ATENCIÓN: Si habla español, tiene a duarte disposición servicios gratuitos de asistencia lingüística. [facility] [phone].    We comply with applicable federal civil rights laws and [place] laws. We do not discriminate on the basis of race, color, national origin, age, disability, sex, sexual orientation, or gender identity.            Thank you!     Thank you for choosing [facility] COLON AND RECTAL SURGERY  for your care. Our goal is always to provide you with excellent care. Hearing back from our patients is one way we can continue to improve our services. Please take a few minutes to complete the written survey that you may receive in the mail after your visit with us. Thank you!             Your Updated Medication List - Protect others around you: Learn how to safely use, store and throw away your medicines at www.disposemymeds.org.          This list is accurate as of 11/7/18  1:29 PM.  Always use your most recent med list.                   Brand Name Dispense Instructions for use Diagnosis    ibuprofen 200 MG tablet    ADVIL/MOTRIN     Take 1-2 tablets (200-400 mg) by mouth every 8 hours as needed for mild pain        lisinopril 10 MG tablet    PRINIVIL/ZESTRIL    90 tablet    Take 1 tablet (10 mg) by mouth daily    Hypertension goal BP " (blood pressure) < 140/90       venlafaxine 75 MG 24 hr capsule    EFFEXOR-XR    270 capsule    TAKE THREE CAPSULES BY MOUTH EVERY DAY    Recurrent major depressive disorder, in partial remission (H)       zinc oxide 20 % ointment    CVS ZINC OXIDE    30 g    Apply topically as needed for dry skin or irritation    Anal fissure

## 2018-11-08 ENCOUNTER — OFFICE VISIT (OUTPATIENT)
Dept: FAMILY MEDICINE | Facility: CLINIC | Age: 48
End: 2018-11-08
Payer: COMMERCIAL

## 2018-11-08 ENCOUNTER — TELEPHONE (OUTPATIENT)
Dept: GASTROENTEROLOGY | Facility: CLINIC | Age: 48
End: 2018-11-08

## 2018-11-08 VITALS
HEIGHT: 67 IN | SYSTOLIC BLOOD PRESSURE: 118 MMHG | DIASTOLIC BLOOD PRESSURE: 70 MMHG | HEART RATE: 100 BPM | BODY MASS INDEX: 30.13 KG/M2 | RESPIRATION RATE: 16 BRPM | WEIGHT: 192 LBS | OXYGEN SATURATION: 99 % | TEMPERATURE: 97.7 F

## 2018-11-08 DIAGNOSIS — Z59.9 FINANCIAL PROBLEMS: ICD-10-CM

## 2018-11-08 DIAGNOSIS — F81.9 COGNITIVE DEVELOPMENTAL DELAY: ICD-10-CM

## 2018-11-08 DIAGNOSIS — F33.1 MODERATE RECURRENT MAJOR DEPRESSION (H): Primary | ICD-10-CM

## 2018-11-08 PROCEDURE — 99213 OFFICE O/P EST LOW 20 MIN: CPT | Performed by: FAMILY MEDICINE

## 2018-11-08 SDOH — ECONOMIC STABILITY - INCOME SECURITY: PROBLEM RELATED TO HOUSING AND ECONOMIC CIRCUMSTANCES, UNSPECIFIED: Z59.9

## 2018-11-08 ASSESSMENT — PATIENT HEALTH QUESTIONNAIRE - PHQ9
SUM OF ALL RESPONSES TO PHQ QUESTIONS 1-9: 8
10. IF YOU CHECKED OFF ANY PROBLEMS, HOW DIFFICULT HAVE THESE PROBLEMS MADE IT FOR YOU TO DO YOUR WORK, TAKE CARE OF THINGS AT HOME, OR GET ALONG WITH OTHER PEOPLE: VERY DIFFICULT
SUM OF ALL RESPONSES TO PHQ QUESTIONS 1-9: 8

## 2018-11-08 ASSESSMENT — ANXIETY QUESTIONNAIRES
4. TROUBLE RELAXING: SEVERAL DAYS
7. FEELING AFRAID AS IF SOMETHING AWFUL MIGHT HAPPEN: SEVERAL DAYS
GAD7 TOTAL SCORE: 7
2. NOT BEING ABLE TO STOP OR CONTROL WORRYING: SEVERAL DAYS
3. WORRYING TOO MUCH ABOUT DIFFERENT THINGS: SEVERAL DAYS
GAD7 TOTAL SCORE: 7
6. BECOMING EASILY ANNOYED OR IRRITABLE: SEVERAL DAYS
7. FEELING AFRAID AS IF SOMETHING AWFUL MIGHT HAPPEN: SEVERAL DAYS
5. BEING SO RESTLESS THAT IT IS HARD TO SIT STILL: SEVERAL DAYS
1. FEELING NERVOUS, ANXIOUS, OR ON EDGE: SEVERAL DAYS
GAD7 TOTAL SCORE: 7

## 2018-11-08 NOTE — PATIENT INSTRUCTIONS
You can contact our clinic anytime to schedule an appointment with Mauricio Roldan, our behavioralist here at [facility].     Shellie Gastelum is our , you could meet with her to discuss your financial situation with her if you'd like.

## 2018-11-08 NOTE — MR AVS SNAPSHOT
After Visit Summary   11/8/2018    Candelario Evangelista    MRN: [id number]           Patient Information     Date Of Birth          1970        Visit Information        Provider Department      11/8/2018 11:00 AM Norma Servin MD [facility]        Today's Diagnoses     Moderate recurrent major depression (H)    -  1    Financial problems          Care Instructions    You can contact our clinic anytime to schedule an appointment with Mauricio Roldan, our behavioralist here at [facility].     Shellie Gastelum is our , you could meet with her to discuss your financial situation with her if you'd like.          Follow-ups after your visit        Follow-up notes from your care team     Return in about 2 weeks (around 11/22/2018) for depression followup.      Your next 10 appointments already scheduled     Oct 23, 2019 10:00 AM CDT   Return Sleep Patient with Bennett Ezra Goltz, PA-C   [facility] ([facility])    [address] [place] [address] [phone]              Who to contact     If you have questions or need follow up information about today's clinic visit or your schedule please contact [facility] directly at [phone].  Normal or non-critical lab and imaging results will be communicated to you by MyChart, letter or phone within 4 business days after the clinic has received the results. If you do not hear from us within 7 days, please contact the clinic through MyChart or phone. If you have a critical or abnormal lab result, we will notify you by phone as soon as possible.  Submit refill requests through [employer] or call your pharmacy and they will forward the refill request to us. Please allow 3 business days for your refill to be completed.          Additional Information About Your Visit        Care EveryWhere ID     This is your Care EveryWhere ID. This could be used by other  "organizations to access your [facility] medical records  [id number]        Your Vitals Were     Pulse Temperature Respirations Height Pulse Oximetry BMI (Body Mass Index)    100 97.7  F (36.5  C) (Tympanic) 16 5' 7\" (1.702 m) 99% 30.07 kg/m2       Blood Pressure from Last 3 Encounters:   11/08/18 118/70   11/07/18 125/84   10/17/18 112/72    Weight from Last 3 Encounters:   11/08/18 192 lb (87.1 kg)   11/07/18 191 lb 4.8 oz (86.8 kg)   10/17/18 192 lb (87.1 kg)              Today, you had the following     No orders found for display       Primary Care Provider Office Phone # Fax #    Norma Servin [phone] [address]  [facility] [address]        Equal Access to Services     [facility]: Hadii gregory rivers hadasho Soalisia, waaxda luqadaha, qaybta kaalmada adeegyada, waxay veronicain hayofe thrasher . So [facility] [phone].    ATENCIÓN: Si habla español, tiene a duarte disposición servicios gratuitos de asistencia lingüística. Robert al [phone].    We comply with applicable federal civil rights laws and [place] laws. We do not discriminate on the basis of race, color, national origin, age, disability, sex, sexual orientation, or gender identity.            Thank you!     Thank you for choosing [facility]  for your care. Our goal is always to provide you with excellent care. Hearing back from our patients is one way we can continue to improve our services. Please take a few minutes to complete the written survey that you may receive in the mail after your visit with us. Thank you!             Your Updated Medication List - Protect others around you: Learn how to safely use, store and throw away your medicines at www.disposemymeds.org.          This list is accurate as of 11/8/18 12:05 PM.  Always use your most recent med list.                   Brand Name Dispense Instructions for use Diagnosis    ibuprofen 200 MG tablet    ADVIL/MOTRIN     Take 1-2 tablets " (200-400 mg) by mouth every 8 hours as needed for mild pain        lisinopril 10 MG tablet    PRINIVIL/ZESTRIL    90 tablet    Take 1 tablet (10 mg) by mouth daily    Hypertension goal BP (blood pressure) < 140/90       venlafaxine 75 MG 24 hr capsule    EFFEXOR-XR    270 capsule    TAKE THREE CAPSULES BY MOUTH EVERY DAY    Recurrent major depressive disorder, in partial remission (H)

## 2018-11-08 NOTE — PROGRESS NOTES
SUBJECTIVE:   Candelario Evangelista is a 48 year old male who presents to clinic today for the following health issues:    Depression Followup    Status since last visit: Worsened ; his relationship ended one month ago    See PHQ-9 for current symptoms.  Other associated symptoms: None    Complicating factors:   Significant life event:  Yes-  Financial worries, can't cover rent and recent car repair  Current substance abuse:  None  Anxiety or Panic symptoms:  No    PHQ 9/6/2018 9/6/2018 11/8/2018   PHQ-9 Total Score - 1 8   Q9: Suicide Ideation Not at all Not at all Several days   F/U: Thoughts of suicide or self-harm - - Yes   F/U: Safety concerns - - No     PHQ-9  English  PHQ-9   Any Language  Suicide Assessment Five-step Evaluation and Treatment (SAFE-T)    Amount of exercise or physical activity: None    Problems taking medications regularly: No    Medication side effects: none    Diet: regular (no restrictions)    Health Maintenance Due   Topic Date Due     HIV SCREEN (SYSTEM ASSIGNED)  01/28/1989     LIPID SCREEN Q5 YR MALE (SYSTEM ASSIGNED)  12/02/2018      Problem list and histories reviewed & adjusted, as indicated.  Additional history: as documented    Patient Active Problem List   Diagnosis     Moderate recurrent major depression (H)     Generalized anxiety disorder     Internal hemorrhoids     Blood in stool     CARDIOVASCULAR SCREENING; LDL GOAL LESS THAN 160     Hypertension goal BP (blood pressure) < 140/90     Intermittent asthma     Overweight (BMI 25.0-29.9)     MRSA (methicillin resistant staph aureus) culture positive     Weight loss     Diverticulosis of large intestine     External hemorrhoids     Abdominal pain, generalized     Oral aphthae     Poor dentition     Tongue lesion     Snoring     Obstructive sleep apnea syndrome     Body mass index 30.0-30.9, adult     Cognitive developmental delay     Mild intermittent asthma without complication     Past Surgical History:   Procedure Laterality  "Date     HERNIA REPAIR, INGUINAL RT/LT       HERNIA REPAIR, INGUINAL RT/LT         Social History   Substance Use Topics     Smoking status: Never Smoker     Smokeless tobacco: Never Used      Comment: second hand at work     Alcohol use No     Family History   Problem Relation Age of Onset     C.A.D. Mother      Hypertension Mother      ?     HEART DISEASE Mother      Mental Illness Mother      Depression Mother      C.A.D. Father      Diabetes Father      Cerebrovascular Disease Father      Hypertension Father      ?     Cancer - colorectal Father      Sleep Apnea Father          Current Outpatient Prescriptions   Medication Sig Dispense Refill     ibuprofen (ADVIL,MOTRIN) 200 MG tablet Take 1-2 tablets (200-400 mg) by mouth every 8 hours as needed for mild pain       lisinopril (PRINIVIL/ZESTRIL) 10 MG tablet Take 1 tablet (10 mg) by mouth daily 90 tablet 3     venlafaxine (EFFEXOR-XR) 75 MG 24 hr capsule TAKE THREE CAPSULES BY MOUTH EVERY  capsule 3     Allergies   Allergen Reactions     Latex Rash     BP Readings from Last 3 Encounters:   11/08/18 118/70   11/07/18 125/84   10/17/18 112/72    Wt Readings from Last 3 Encounters:   11/08/18 192 lb (87.1 kg)   11/07/18 191 lb 4.8 oz (86.8 kg)   10/17/18 192 lb (87.1 kg)                    Reviewed and updated as needed this visit by clinical staff  Tobacco  Allergies  Meds       Reviewed and updated as needed this visit by Provider  Meds         ROS:  Constitutional, HEENT, cardiovascular, pulmonary, gi and gu systems are negative, except as otherwise noted.    OBJECTIVE:     /70 (BP Location: Right arm, Patient Position: Sitting, Cuff Size: Adult Regular)  Pulse 100  Temp 97.7  F (36.5  C) (Tympanic)  Resp 16  Ht 5' 7\" (1.702 m)  Wt 192 lb (87.1 kg)  SpO2 99%  BMI 30.07 kg/m2  Body mass index is 30.07 kg/(m^2).  GENERAL: healthy, alert and no distress  NEURO: Normal strength and tone and cognition slightly child like, usual for him  PSYCH: " affect flat and appears very sad      ASSESSMENT/PLAN:       1. Moderate recurrent major depression (H)  Acute worsening due to  2. Financial problems  PHQ-9 score:    PHQ-9 SCORE 11/8/2018   Total Score -   Total Score Vipin 8 (Mild depression)   Total Score 8     I feel his degree of depression isn't really reflected by his phq9 score today. He's  from his girlfriend, had been staying with her, she is still contacting him but he's very sad they're not still together. He's also having significant financial difficulties.  He's at max of his antidepressant right now, I recommend therapeutic support, he's still getting established with a therapist so doesn't have a close bond, I recommended our behavioralist for more acute sessions if he needs them.  Recommended social work for financial assistance, he declined to meet with Shellie today. I will put referral in.  Return to clinic 2 weeks for follow up.    3. Cognitive developmental delay  Mild, stable      Norma Servin MD  [facility]  Answers for HPI/ROS submitted by the patient on 11/8/2018   If you checked off any problems, how difficult have these problems made it for you to do your work, take care of things at home, or get along with other people?: Very difficult  PHQ9 TOTAL SCORE: 8  FRANDY 7 TOTAL SCORE: 7

## 2018-11-09 ENCOUNTER — PATIENT OUTREACH (OUTPATIENT)
Dept: CARE COORDINATION | Facility: CLINIC | Age: 48
End: 2018-11-09

## 2018-11-09 ASSESSMENT — ANXIETY QUESTIONNAIRES: GAD7 TOTAL SCORE: 7

## 2018-11-09 ASSESSMENT — PATIENT HEALTH QUESTIONNAIRE - PHQ9: SUM OF ALL RESPONSES TO PHQ QUESTIONS 1-9: 8

## 2018-11-09 NOTE — LETTER
Health Care Home - Access Care Plan    About Me  Patient Name:  Candelario Evangelista    YOB: 1970  Age:                             48 year old   Eirc MRN:            [id number] Telephone Information:     Home Phone [phone]   Mobile [phone]       Address:    [address] [facility] [address]  [facility] [place] [facility] [address] Email address:  [email or web address]      Emergency Contact(s)  Name Relationship Lgl Grd Work Phone Home Phone Mobile Phone   1. PRUDENCE LASSITER Sister   [phone]   2. NO SECONDARY C*    none              Health Maintenance:      My Access Plan  Medical Emergency 911   Questions or concerns during clinic hours Primary Clinic Line, I will call the clinic directly: [facility] [phone]   24 Hour Appointment Line [phone] or  [phone]) (toll free)   24 Hour Nurse Line [phone] (toll free)   Questions or concerns outside clinic hours 24 Hour Appointment Line, I will call the after-hours on-call line:   [facility] [phone] or [phone] ([phone]) (toll-free)   Preferred Urgent Care     Preferred Hospital     Preferred Pharmacy [facility] [address]     Behavioral Health Crisis Line The National Suicide Prevention Lifeline at 1-412.325.4814 or 911     My Care Team Members  Patient Care Team       Relationship Specialty Notifications Start End    Norma Servin MD PCP - General Family Practice  2/28/11     Phone: [phone] Fax: [phone] [address] [facility] [address]    Rosa Suazo APRN CNP Nurse Practitioner Nurse Practitioner  10/4/18     Phone: [phone] Fax: [phone]         [address] [place] [address]

## 2018-11-09 NOTE — PROGRESS NOTES
Clinic Care Coordination Contact  [facility]/Voicemail    Referral Source: PCP - Care Coordination Referral - Financial Support: Housing and Mental Wellness (Health) (Mental Illness/Chemical Dependency): Resources of Behavioral Health Services and Treatment Options Other-  from his girlfriend, had been staying with her, significant financial difficulties, he's still getting established with a therapist so doesn't have a close bond, recommended [facility] support.    Clinical Data: Care Coordinator Outreach  Outreach attempted x 1.  Left message on voicemail with call back information and requested return call.  Plan: Care Coordinator will mail out care coordination introduction letter with care coordinator contact information and explanation of care coordination services. Care Coordinator will try to reach patient again in 1-2 business days.      SANDRA Beard     Care Coordinator  [facility]  [phone]

## 2018-11-09 NOTE — LETTER
Boss CARE COORDINATION - [facility]  [address] [place] [address]    November 12, 2018    Candelario Evangelista  [address]  [address]  [facility] [id number]      Dear Candelario,    I am a clinic care coordinator who works with Norma Servin MD at [facility]. I recently tried to call and was unable to reach you. I wanted to introduce myself and provide you with my contact information so that you can call me with questions or concerns about your health care. Below is a description of clinic care coordination and how I can further assist you.     The clinic care coordinator is a registered nurse and/or  who understand the health care system. The goal of clinic care coordination is to help you manage your health and improve access to the [facility] system in the most efficient manner. The registered nurse can assist you in meeting your health care goals by providing education, coordinating services, and strengthening the communication among your providers. The  can assist you with financial, behavioral, psychosocial, chemical dependency, counseling, and/or psychiatric resources.    Please feel free to contact me at [phone], with any questions or concerns. We at [facility] are focused on providing you with the highest-quality healthcare experience possible and that all starts with you.     Sincerely,     SANDRA Beard    Enclosed: I have enclosed a copy of a 24 Hour Access Plan. This has helpful phone numbers for you to call when needed. Please keep this in an easy to access place to use as needed.

## 2018-11-12 NOTE — PROGRESS NOTES
Clinic Care Coordination Contact  [facility]/Voicemail    Referral Source: PCP  Clinical Data: Care Coordinator Outreach  Outreach attempted x 2.  Left message on voicemail with call back information and requested return call.  Plan: Care Coordinator will mail out care coordination introduction letter with care coordinator contact information and explanation of care coordination services. Care Coordinator is closing patient to Care Coordination services at this time - no further outreaches at this time.    SANDRA Beard     Care Coordinator  [facility]  [phone]

## 2018-12-30 ENCOUNTER — TRANSFERRED RECORDS (OUTPATIENT)
Dept: HEALTH INFORMATION MANAGEMENT | Facility: CLINIC | Age: 48
End: 2018-12-30

## 2019-01-03 ENCOUNTER — DOCUMENTATION ONLY (OUTPATIENT)
Dept: SLEEP MEDICINE | Facility: CLINIC | Age: 49
End: 2019-01-03

## 2019-01-03 DIAGNOSIS — G47.33 OBSTRUCTIVE SLEEP APNEA SYNDROME: ICD-10-CM

## 2019-01-03 NOTE — PROGRESS NOTES
6 month [facility]    STM Recheck Visit     Diagnostic AHI:   PSG AHI: 80            Data only recheck     Device type:   PAP Device: Auto-CPAP ()       Assessment: Pt meeting objective benchmarks.       Action plan:   pt to follow up per provider request           Objective measure goal  Compliance   Goal >70%  Leak   Goal < 24 lpm/ 10% of night in large leak  AHI  Goal < 5  Usage  Goal >240

## 2019-02-07 ENCOUNTER — TELEPHONE (OUTPATIENT)
Dept: FAMILY MEDICINE | Facility: CLINIC | Age: 49
End: 2019-02-07

## 2019-02-07 NOTE — TELEPHONE ENCOUNTER
Patient calls stating that the last week of December he had issues with teeth on the right side of his jaw.  Was supposed to have the teeth pulled, but when he went to the dentist they would not do the extractions because his blood pressure was too high.  Was 160 over something  Patient now has painful tooth on the left side as well  Is on Blood pressure medication, but not adequately controlled per is report.  Patient was offered an appointment this afternoon but is not able to make it to clinic in [facility].  Appointment scheduled for late Friday afternoon with Dr Hung, and he will try to get off work early tomorrow to be seen  Is asking for blood pressure to be addressed and also for possible pain control for the teeth.  Patient was instructed on the 2 hour cancellation policy and will call if not able to get off work to be seen.  Alternate plan is to be seen in Urgent care regarding the tooth pain.  Patient will call his dental office as well to find out what BP needs to be for them to complete the extractions.  Corrie Michael RN

## 2019-02-08 ENCOUNTER — OFFICE VISIT (OUTPATIENT)
Dept: FAMILY MEDICINE | Facility: CLINIC | Age: 49
End: 2019-02-08
Payer: COMMERCIAL

## 2019-02-08 VITALS
TEMPERATURE: 99.1 F | SYSTOLIC BLOOD PRESSURE: 127 MMHG | OXYGEN SATURATION: 96 % | RESPIRATION RATE: 18 BRPM | WEIGHT: 193 LBS | HEART RATE: 103 BPM | BODY MASS INDEX: 30.23 KG/M2 | DIASTOLIC BLOOD PRESSURE: 90 MMHG

## 2019-02-08 DIAGNOSIS — K08.89 TOOTH PAIN: ICD-10-CM

## 2019-02-08 DIAGNOSIS — I10 HYPERTENSION GOAL BP (BLOOD PRESSURE) < 140/90: ICD-10-CM

## 2019-02-08 DIAGNOSIS — K02.9 DENTAL CARIES: Primary | ICD-10-CM

## 2019-02-08 PROCEDURE — 99214 OFFICE O/P EST MOD 30 MIN: CPT | Performed by: FAMILY MEDICINE

## 2019-02-08 RX ORDER — LISINOPRIL 10 MG/1
15 TABLET ORAL DAILY
Qty: 135 TABLET | Refills: 3 | Status: SHIPPED | OUTPATIENT
Start: 2019-02-08 | End: 2019-02-28

## 2019-02-08 RX ORDER — IBUPROFEN 800 MG/1
800 TABLET, FILM COATED ORAL EVERY 8 HOURS PRN
Qty: 30 TABLET | Refills: 0 | Status: SHIPPED | OUTPATIENT
Start: 2019-02-08 | End: 2020-02-08

## 2019-02-08 NOTE — PROGRESS NOTES
SUBJECTIVE:   Candelario Evangelista is a 49 year old male who presents to clinic today for the following health issues:    Hypertension Follow-up      Outpatient blood pressures are being checked at dental office.  Results are 159/106 160/99.    Low Salt Diet: no added salt      Amount of exercise or physical activity: None    Problems taking medications regularly: No    Medication side effects: none    Diet: low salt    Other:  Tooth pain x 1 month. Has oral surgery 2/20/19 and wants some pain meds     bp was high - seen by oral surgeon but they will not act on it until bp is better.     Last time when he took 20mg - he had hypotension.     Been to ER. Painful and swollen face.   Had antibiotics.     Problem list and histories reviewed & adjusted, as indicated.  Additional history: as documented    Labs reviewed in EPIC    Reviewed and updated as needed this visit by clinical staff  Tobacco  Allergies  Med Hx  Surg Hx  Fam Hx  Soc Hx      Reviewed and updated as needed this visit by Provider      Social History     Socioeconomic History     Marital status: Single     Spouse name: Not on file     Number of children: 0     Years of education: 12     Highest education level: Not on file   Social Needs     Financial resource strain: Not on file     Food insecurity - worry: Not on file     Food insecurity - inability: Not on file     Transportation needs - medical: Not on file     Transportation needs - non-medical: Not on file   Occupational History     Occupation: environmental services   Tobacco Use     Smoking status: Never Smoker     Smokeless tobacco: Never Used     Tobacco comment: second hand at work   Substance and Sexual Activity     Alcohol use: No     Drug use: No     Sexual activity: No     Partners: Female     Birth control/protection: Condom   Other Topics Concern     Parent/sibling w/ CABG, MI or angioplasty before 65F 55M? Yes   Social History Narrative     Not on file     Allergies   Allergen  Reactions     Latex Rash     Patient Active Problem List   Diagnosis     Moderate recurrent major depression (H)     Generalized anxiety disorder     Internal hemorrhoids     Blood in stool     CARDIOVASCULAR SCREENING; LDL GOAL LESS THAN 160     Hypertension goal BP (blood pressure) < 140/90     Intermittent asthma     Overweight (BMI 25.0-29.9)     MRSA (methicillin resistant staph aureus) culture positive     Weight loss     Diverticulosis of large intestine     External hemorrhoids     Abdominal pain, generalized     Oral aphthae     Poor dentition     Tongue lesion     Snoring     Obstructive sleep apnea syndrome     Body mass index 30.0-30.9, adult     Cognitive developmental delay     Mild intermittent asthma without complication     Reviewed medications, social history and  past medical and surgical history.    Review of system: for general, respiratory, CVS, GI and psychiatry negative except for noted above.     EXAM:  /90 (BP Location: Left arm, Patient Position: Sitting, Cuff Size: Adult Regular)   Pulse 103   Temp 99.1  F (37.3  C) (Oral)   Resp 18   Wt 87.5 kg (193 lb)   SpO2 96%   BMI 30.23 kg/m    Constitutional: healthy, alert and no distress   Psychiatric: mentation appears normal and affect normal/bright  Cardiovascular: RRR. No murmurs,  Multiple teeth eroded, caries present.     ASSESSMENT / PLAN:  (K02.9) Dental caries  (primary encounter diagnosis)  Comment: needs dental work. Ibuprofen can raise bp but OK to use for now.   Plan: ibuprofen (ADVIL/MOTRIN) 800 MG tablet              (I10) Hypertension goal BP (blood pressure) < 140/90  Comment: bp is better in the clinic but still above goal. His bp is much higher at dentist. Previously with 10mg of lisinopril he had hypotension and we agreed to increase it to 15mg per day. We discussed to come back in a week for bp recheck to make sure he is not having hypotension. He is seeing his oral surgeon next week and we will see him before  his oral surgeons appt.   Plan: lisinopril (PRINIVIL/ZESTRIL) 10 MG tablet             (K08.89) Tooth pain  Comment:  See above.   Plan: ibuprofen (ADVIL/MOTRIN) 800 MG tablet             Follow up:  See above.     The above note was dictated using voice recognition. Although reviewed after completion, some word and grammatical error may remain .

## 2019-02-18 ENCOUNTER — ALLIED HEALTH/NURSE VISIT (OUTPATIENT)
Dept: NURSING | Facility: CLINIC | Age: 49
End: 2019-02-18
Payer: COMMERCIAL

## 2019-02-18 VITALS — RESPIRATION RATE: 16 BRPM | SYSTOLIC BLOOD PRESSURE: 134 MMHG | DIASTOLIC BLOOD PRESSURE: 90 MMHG | HEART RATE: 86 BPM

## 2019-02-18 DIAGNOSIS — Z01.30 BP CHECK: Primary | ICD-10-CM

## 2019-02-18 PROCEDURE — 99207 ZZC NO CHARGE NURSE ONLY: CPT

## 2019-02-18 NOTE — Clinical Note
Candelario Evangelista is a 49 year old patient who comes in today for a Blood Pressure check.Initial BP:  /90 (BP Location: Left arm, Patient Position: Chair, Cuff Size: Adult Regular)   Pulse 86   Resp 16    86Disposition: BP elevated.  Triage RN notified, patient asked to waitScheduled with provider for BP follow-up appointment.Lilly Bhatt, CMA

## 2019-02-19 NOTE — PROGRESS NOTES
Candelario Evangelista is a 49 year old patient who comes in today for a Blood Pressure check.  Initial BP:  /90 (BP Location: Left arm, Patient Position: Chair, Cuff Size: Adult Regular)   Pulse 86   Resp 16      86  Disposition: BP elevated.  Triage RN notified, patient asked to wait  Scheduled with provider for BP follow-up appointment.    Lilly Bhatt, CMA

## 2019-02-28 ENCOUNTER — TELEPHONE (OUTPATIENT)
Dept: FAMILY MEDICINE | Facility: CLINIC | Age: 49
End: 2019-02-28

## 2019-02-28 ENCOUNTER — OFFICE VISIT (OUTPATIENT)
Dept: FAMILY MEDICINE | Facility: CLINIC | Age: 49
End: 2019-02-28
Payer: COMMERCIAL

## 2019-02-28 VITALS
HEIGHT: 67 IN | BODY MASS INDEX: 30.13 KG/M2 | WEIGHT: 192 LBS | TEMPERATURE: 98 F | OXYGEN SATURATION: 98 % | DIASTOLIC BLOOD PRESSURE: 85 MMHG | HEART RATE: 96 BPM | RESPIRATION RATE: 14 BRPM | SYSTOLIC BLOOD PRESSURE: 117 MMHG

## 2019-02-28 DIAGNOSIS — I10 HYPERTENSION GOAL BP (BLOOD PRESSURE) < 140/90: Primary | ICD-10-CM

## 2019-02-28 DIAGNOSIS — I10 HYPERTENSION GOAL BP (BLOOD PRESSURE) < 140/90: ICD-10-CM

## 2019-02-28 DIAGNOSIS — F81.9 COGNITIVE DEVELOPMENTAL DELAY: ICD-10-CM

## 2019-02-28 PROCEDURE — 99213 OFFICE O/P EST LOW 20 MIN: CPT | Performed by: FAMILY MEDICINE

## 2019-02-28 RX ORDER — PROPRANOLOL HYDROCHLORIDE 10 MG/1
10 TABLET ORAL 3 TIMES DAILY
Qty: 90 TABLET | Refills: 1 | Status: SHIPPED | OUTPATIENT
Start: 2019-02-28 | End: 2019-02-28

## 2019-02-28 RX ORDER — LISINOPRIL 10 MG/1
10 TABLET ORAL DAILY
Qty: 90 TABLET | Refills: 1 | Status: SHIPPED | OUTPATIENT
Start: 2019-02-28 | End: 2020-01-07

## 2019-02-28 RX ORDER — PROPRANOLOL HYDROCHLORIDE 10 MG/1
10 TABLET ORAL 3 TIMES DAILY PRN
Qty: 90 TABLET | Refills: 1 | Status: SHIPPED | OUTPATIENT
Start: 2019-02-28 | End: 2020-01-14

## 2019-02-28 ASSESSMENT — MIFFLIN-ST. JEOR: SCORE: 1699.54

## 2019-02-28 NOTE — TELEPHONE ENCOUNTER
Reason for Call:  Other call back    Detailed comments: pt states that the directions on his medlist dont match what he was told for dosage and wants to verify .    Phone Number Patient can be reached at: Home number on file [phone] (home)    Best Time: asap    Can we leave a detailed message on this number? YES    Call taken on 2/28/2019 at 1:27 PM by Katrina Landa

## 2019-02-28 NOTE — PATIENT INSTRUCTIONS
Your blood pressure seems to be going up when you're nervous or stressed, so I recommend taking propranolol 20 minutes to several hours before your dental visit.

## 2019-02-28 NOTE — PROGRESS NOTES
SUBJECTIVE:   Candelario Evangelista is a 49 year old male who presents to clinic today for the following health issues:      Hypertension Follow-up      Outpatient blood pressures are being checked at work.  Results are 140/110 when checked at work a few weeks ago.    Low Salt Diet: not monitoring salt    He's doing well with increased lisinopril, but has trouble cutting pills.     He's on 15 mg lisinopril; when he's been on 20 mg he's had symptomatic hypotension.      Amount of exercise or physical activity: None    Problems taking medications regularly: No    Medication side effects: none    Diet: regular (no restrictions)    Health Maintenance Due   Topic Date Due     PREVENTIVE CARE VISIT  1970     HIV SCREEN (SYSTEM ASSIGNED)  01/28/1989     LIPID SCREEN Q5 YR MALE (SYSTEM ASSIGNED)  12/02/2018     DTAP/TDAP/TD IMMUNIZATION (2 - Td) 02/01/2019        Problem list and histories reviewed & adjusted, as indicated.  Additional history: as documented    Patient Active Problem List   Diagnosis     Moderate recurrent major depression (H)     Generalized anxiety disorder     Internal hemorrhoids     Blood in stool     CARDIOVASCULAR SCREENING; LDL GOAL LESS THAN 160     Hypertension goal BP (blood pressure) < 140/90     Intermittent asthma     Overweight (BMI 25.0-29.9)     MRSA (methicillin resistant staph aureus) culture positive     Weight loss     Diverticulosis of large intestine     External hemorrhoids     Abdominal pain, generalized     Oral aphthae     Poor dentition     Tongue lesion     Snoring     Obstructive sleep apnea syndrome     Body mass index 30.0-30.9, adult     Cognitive developmental delay     Mild intermittent asthma without complication     Past Surgical History:   Procedure Laterality Date     HERNIA REPAIR, INGUINAL RT/LT       HERNIA REPAIR, INGUINAL RT/LT         Social History     Tobacco Use     Smoking status: Never Smoker     Smokeless tobacco: Never Used     Tobacco comment: second  "hand at work   Substance Use Topics     Alcohol use: No     Family History   Problem Relation Age of Onset     C.A.D. Mother      Hypertension Mother         ?     Heart Disease Mother      Mental Illness Mother      Depression Mother      C.A.D. Father      Diabetes Father      Cerebrovascular Disease Father      Hypertension Father         ?     Cancer - colorectal Father      Sleep Apnea Father          Allergies   Allergen Reactions     Latex Rash     Recent Labs   Lab Test 09/06/18  1245 08/14/18  1718 02/02/17  1141 12/02/13  1534 11/09/11  0742   A1C 4.9  --   --   --   --    LDL  --   --   --  103  --    HDL  --   --   --  36*  --    TRIG  --   --   --  252*  --    ALT  --  30 26 40  --    CR  --  1.01 0.94 0.86  --    GFRESTIMATED  --  79 86 >90  --    GFRESTBLACK  --  >90 >90   GFR Calc   >90  --    POTASSIUM  --  4.5 3.8 4.3  --    TSH  --   --   --   --  1.16      BP Readings from Last 3 Encounters:   02/28/19 117/85   02/18/19 134/90   02/08/19 127/90    Wt Readings from Last 3 Encounters:   02/28/19 87.1 kg (192 lb)   02/08/19 87.5 kg (193 lb)   11/08/18 87.1 kg (192 lb)          Reviewed and updated as needed this visit by clinical staff  Tobacco  Allergies  Meds       Reviewed and updated as needed this visit by Provider         ROS:  Constitutional, HEENT, cardiovascular, pulmonary, gi and gu systems are negative, except as otherwise noted.    OBJECTIVE:     /85   Pulse 96   Temp 98  F (36.7  C) (Oral)   Resp 14   Ht 1.702 m (5' 7\")   Wt 87.1 kg (192 lb)   SpO2 98%   BMI 30.07 kg/m    Body mass index is 30.07 kg/m .  GENERAL: healthy, alert and no distress  NECK: no adenopathy, no asymmetry, masses, or scars and thyroid normal to palpation  RESP: lungs clear to auscultation - no rales, rhonchi or wheezes  CV: regular rate and rhythm, normal S1 S2, no S3 or S4, no murmur, click or rub, no peripheral edema and peripheral pulses strong  MS: no gross musculoskeletal " defects noted, no edema    ASSESSMENT/PLAN:     1. Hypertension goal BP (blood pressure) < 140/90  At goal, narrowed pulse pressure, goes up with stress (going to dentist), can use beta blocker prn stress/anxiety  - propranolol (INDERAL) 10 MG tablet; Take 1 tablet (10 mg) by mouth 3 times daily  Dispense: 90 tablet; Refill: 1    2. Cognitive developmental delay  Complicating medical complaince      Norma Servin MD  [facility]

## 2019-02-28 NOTE — TELEPHONE ENCOUNTER
Pt was seen today by Dr Servin.  He was started on propranolol 10 mg three times daily  Dr Servin said this it to take prn, not scheduled as he takes it for anxiety which causes his BP to go up.   (Not sure if this is pt's question but just in case I did ask Dr Servin)      Attempted to reach, unable. Left message  to call back.  Suma Humphrey RN

## 2019-02-28 NOTE — TELEPHONE ENCOUNTER
ASSESSMENT / PLAN:  (I10) Hypertension goal BP (blood pressure) < 140/90  Comment: I rewrote medications.  Plan: propranolol (INDERAL) 10 MG tablet po tid prn anxiety/stress  , lisinopril         (PRINIVIL/ZESTRIL) 10 MG tablet po daily        Sincerely,  Dr. Norma Servin MD  2/28/2019

## 2019-02-28 NOTE — TELEPHONE ENCOUNTER
"Pt states on med list it says to take 1 1/2 tabs of lisinopril but she said \"I was suppose to go back to just one\"     And for the propranolol he was understanding he was just suppose to take it before the dentist, not three times daily    Dr Servin, can you put new directions on the propranolol rx ? How much lisinopril do you want him to take?    Suma Humphrey, RN, BSN           "

## 2019-06-28 NOTE — PROGRESS NOTES
Subjective     Candelario Evangelista is a 49 year old male who presents to clinic today for the following health issues:    HPI   Musculoskeletal problem/pain      Duration: 3 weeks    Description  Location: DIOGENES hands    Intensity:  No pain    Accompanying signs and symptoms: numbness and itching    History  Previous similar problem: none  Previous evaluation:  none    Precipitating or alleviating factors:  Trauma or overuse: no   Aggravating factors include: none    Therapies tried and outcome: nothing     Both hands numbness for last 3 weeks.  No new food, trigger or injury.   Not painful.   Uncomfortable and sometime itchy.   Not waking up from sleep.  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 May worsen some symptoms but nothing serious.  He does not think it is anxiety related.    He has a history of mild cognitive delay disorder       Social History     Occupational History     Occupation: environmental services   Tobacco Use     Smoking status: Never Smoker     Smokeless tobacco: Never Used     Tobacco comment: second hand at work   Substance and Sexual Activity     Alcohol use: No     Drug use: No     Sexual activity: Never     Partners: Female     Birth control/protection: Condom     Allergies   Allergen Reactions     Latex Rash     Patient Active Problem List   Diagnosis     Moderate recurrent major depression (H)     Generalized anxiety disorder     Internal hemorrhoids     Blood in stool     CARDIOVASCULAR SCREENING; LDL GOAL LESS THAN 160     Hypertension goal BP (blood pressure) < 140/90     Intermittent asthma     Overweight (BMI 25.0-29.9)     MRSA (methicillin resistant staph aureus) culture positive     Weight loss     Diverticulosis of large intestine     External hemorrhoids     Abdominal pain, generalized     Oral aphthae     Poor dentition     Tongue lesion     Snoring     Obstructive sleep apnea syndrome     Body mass index 30.0-30.9, adult     Cognitive developmental delay     Mild  intermittent asthma without complication     Reviewed medications, social history and  past medical and surgical history.    Review of system: for general, respiratory, CVS, GI and psychiatry negative except for noted above.     EXAM:  /74 (BP Location: Right arm, Patient Position: Chair, Cuff Size: Adult Large)   Pulse 101   Temp 98.7  F (37.1  C) (Oral)   Resp 16   Wt 94.5 kg (208 lb 4 oz)   SpO2 94%   BMI 32.62 kg/m    Constitutional: healthy, alert and no distress   Psychiatric: Mildly anxious but pleasant  NEURO: Gait normal.  Both upper extremity reflexes are normal and symmetrical.   is normal.  Monofilament negative bilaterally.  Vibration sensation normal and symmetrical bilaterally.  Mild bilateral upper extremity coarse tremor present.  Tinel and Phalen sign negative   JOINT/EXTREMITIES: extremities normal- no gross deformities noted, gait normal and normal muscle tone     ASSESSMENT / PLAN:  (R20.2) Paresthesia of both hands  (primary encounter diagnosis)  Comment: Unclear etiology.  We will obtain some basic lab tests.  He does not have any known deficit.  His exam is fairly benign.  It is reasonable to try topical steroid and if that fails would be reasonable to see neurology.  Referral given.  Plan: Vitamin B12, CBC with platelets, Comprehensive         metabolic panel, Vitamin B1 whole blood,         NEUROLOGY ADULT REFERRAL, triamcinolone         (KENALOG) 0.1 % external cream             (F41.1) Generalized anxiety disorder  Comment:    Plan: Not sure if it contributes to above.  He has mild cognitive deficit but nothing major that can contribute.      Return in about 3 months (around 10/3/2019).      The above note was dictated using voice recognition. Although reviewed after completion, some word and grammatical error may remain .

## 2019-07-03 ENCOUNTER — OFFICE VISIT (OUTPATIENT)
Dept: FAMILY MEDICINE | Facility: CLINIC | Age: 49
End: 2019-07-03
Payer: COMMERCIAL

## 2019-07-03 VITALS
DIASTOLIC BLOOD PRESSURE: 74 MMHG | RESPIRATION RATE: 16 BRPM | SYSTOLIC BLOOD PRESSURE: 122 MMHG | WEIGHT: 208.25 LBS | TEMPERATURE: 98.7 F | HEART RATE: 101 BPM | BODY MASS INDEX: 32.62 KG/M2 | OXYGEN SATURATION: 94 %

## 2019-07-03 DIAGNOSIS — R20.2 PARESTHESIA OF BOTH HANDS: Primary | ICD-10-CM

## 2019-07-03 DIAGNOSIS — F41.1 GENERALIZED ANXIETY DISORDER: ICD-10-CM

## 2019-07-03 LAB
ERYTHROCYTE [DISTWIDTH] IN BLOOD BY AUTOMATED COUNT: 16.7 % (ref 10–15)
HCT VFR BLD AUTO: 45 % (ref 40–53)
HGB BLD-MCNC: 14.5 G/DL (ref 13.3–17.7)
MCH RBC QN AUTO: 26.4 PG (ref 26.5–33)
MCHC RBC AUTO-ENTMCNC: 32.2 G/DL (ref 31.5–36.5)
MCV RBC AUTO: 82 FL (ref 78–100)
PLATELET # BLD AUTO: 228 10E9/L (ref 150–450)
RBC # BLD AUTO: 5.49 10E12/L (ref 4.4–5.9)
VIT B12 SERPL-MCNC: 442 PG/ML (ref 193–986)
WBC # BLD AUTO: 6.4 10E9/L (ref 4–11)

## 2019-07-03 PROCEDURE — 80053 COMPREHEN METABOLIC PANEL: CPT | Performed by: FAMILY MEDICINE

## 2019-07-03 PROCEDURE — 36415 COLL VENOUS BLD VENIPUNCTURE: CPT | Performed by: FAMILY MEDICINE

## 2019-07-03 PROCEDURE — 99214 OFFICE O/P EST MOD 30 MIN: CPT | Performed by: FAMILY MEDICINE

## 2019-07-03 PROCEDURE — 84425 ASSAY OF VITAMIN B-1: CPT | Mod: 90 | Performed by: FAMILY MEDICINE

## 2019-07-03 PROCEDURE — 99000 SPECIMEN HANDLING OFFICE-LAB: CPT | Performed by: FAMILY MEDICINE

## 2019-07-03 PROCEDURE — 82607 VITAMIN B-12: CPT | Performed by: FAMILY MEDICINE

## 2019-07-03 PROCEDURE — 85027 COMPLETE CBC AUTOMATED: CPT | Performed by: FAMILY MEDICINE

## 2019-07-03 RX ORDER — TRIAMCINOLONE ACETONIDE 1 MG/G
CREAM TOPICAL 2 TIMES DAILY
Qty: 45 G | Refills: 0 | Status: SHIPPED | OUTPATIENT
Start: 2019-07-03 | End: 2020-09-24

## 2019-07-03 ASSESSMENT — PATIENT HEALTH QUESTIONNAIRE - PHQ9
SUM OF ALL RESPONSES TO PHQ QUESTIONS 1-9: 0
5. POOR APPETITE OR OVEREATING: NOT AT ALL

## 2019-07-03 ASSESSMENT — ANXIETY QUESTIONNAIRES
5. BEING SO RESTLESS THAT IT IS HARD TO SIT STILL: NOT AT ALL
GAD7 TOTAL SCORE: 0
6. BECOMING EASILY ANNOYED OR IRRITABLE: NOT AT ALL
2. NOT BEING ABLE TO STOP OR CONTROL WORRYING: NOT AT ALL
IF YOU CHECKED OFF ANY PROBLEMS ON THIS QUESTIONNAIRE, HOW DIFFICULT HAVE THESE PROBLEMS MADE IT FOR YOU TO DO YOUR WORK, TAKE CARE OF THINGS AT HOME, OR GET ALONG WITH OTHER PEOPLE: NOT DIFFICULT AT ALL
7. FEELING AFRAID AS IF SOMETHING AWFUL MIGHT HAPPEN: NOT AT ALL
3. WORRYING TOO MUCH ABOUT DIFFERENT THINGS: NOT AT ALL
1. FEELING NERVOUS, ANXIOUS, OR ON EDGE: NOT AT ALL

## 2019-07-04 LAB
ALBUMIN SERPL-MCNC: 3.7 G/DL (ref 3.4–5)
ALP SERPL-CCNC: 161 U/L (ref 40–150)
ALT SERPL W P-5'-P-CCNC: 58 U/L (ref 0–70)
ANION GAP SERPL CALCULATED.3IONS-SCNC: 11 MMOL/L (ref 3–14)
AST SERPL W P-5'-P-CCNC: 34 U/L (ref 0–45)
BILIRUB SERPL-MCNC: 0.4 MG/DL (ref 0.2–1.3)
BUN SERPL-MCNC: 13 MG/DL (ref 7–30)
CALCIUM SERPL-MCNC: 8.9 MG/DL (ref 8.5–10.1)
CHLORIDE SERPL-SCNC: 107 MMOL/L (ref 94–109)
CO2 SERPL-SCNC: 21 MMOL/L (ref 20–32)
CREAT SERPL-MCNC: 0.88 MG/DL (ref 0.66–1.25)
GFR SERPL CREATININE-BSD FRML MDRD: >90 ML/MIN/{1.73_M2}
GLUCOSE SERPL-MCNC: 102 MG/DL (ref 70–99)
POTASSIUM SERPL-SCNC: 4.2 MMOL/L (ref 3.4–5.3)
PROT SERPL-MCNC: 7.2 G/DL (ref 6.8–8.8)
SODIUM SERPL-SCNC: 139 MMOL/L (ref 133–144)

## 2019-07-04 ASSESSMENT — ANXIETY QUESTIONNAIRES: GAD7 TOTAL SCORE: 0

## 2019-07-05 ENCOUNTER — TELEPHONE (OUTPATIENT)
Dept: FAMILY MEDICINE | Facility: CLINIC | Age: 49
End: 2019-07-05

## 2019-07-05 NOTE — TELEPHONE ENCOUNTER
Left message on machine to call back.  Ask to speak to an RN, let them know it's a return call.    Leave a number and time that you can be reached.   Corrie Michael RN

## 2019-07-05 NOTE — TELEPHONE ENCOUNTER
"The bottom of his right foot turns a couple different colors, maybe red or pink, this when he puts it in bathtub water. Right foot hurts anyway when he first wakes up, when walking the bottom of foot hurts. Doesn't hurt more when he puts it in the water. He just noticed this so not sure how long it has been going on , maybe started one or two weeks ago. When asked if foot numb \"maybe a little bit\"     He is also waiting for his bloodwork results.     Can you review labs and any advice on this foot issue?     Suma Humphrey, RN, BSN               "

## 2019-07-05 NOTE — TELEPHONE ENCOUNTER
Reason for Call:  Other FYI    Detailed comments: Patient forgot to mention during his LOV that when he puts his foot in water it turns a different color. Please advise, thank you!    Phone Number Patient can be reached at: Home number on file [phone] (home)    Best Time: anytime    Can we leave a detailed message on this number? YES    Call taken on 7/5/2019 at 12:00 PM by Payal Baca

## 2019-07-06 LAB — VIT B1 BLD-MCNC: 150 NMOL/L (ref 70–180)

## 2019-07-08 NOTE — TELEPHONE ENCOUNTER
Monitor for foot issue for another week or so and if persist or if hand tingling does not improve - see our neurologist.

## 2019-07-08 NOTE — TELEPHONE ENCOUNTER
Writer left detailed message on identified voicemail relaying comments per Dr. Hung.  ALEXUS Guzman, BSN, RN

## 2019-09-25 DIAGNOSIS — F33.41 RECURRENT MAJOR DEPRESSIVE DISORDER, IN PARTIAL REMISSION (H): ICD-10-CM

## 2019-09-25 NOTE — TELEPHONE ENCOUNTER
"Requested Prescriptions   Pending Prescriptions Disp Refills     venlafaxine (EFFEXOR-XR) 75 MG 24 hr capsule [Pharmacy Med Name: VENLAFAXINE HCL ER 75MG CP24] 270 capsule 3     Sig: TAKE THREE CAPSULES BY MOUTH ONCE DAILY  Last Written Prescription Date:  9/6/2018  Last Fill Quantity: 270 capsule,  # refills: 3   Last Office Visit: 7/3/2019   Future Office Visit:            Serotonin-Norepinephrine Reuptake Inhibitors  Passed - 9/25/2019  5:01 AM        Passed - Blood pressure under 140/90 in past 12 months     BP Readings from Last 3 Encounters:   07/03/19 122/74   02/28/19 117/85   02/18/19 134/90           Passed - PHQ-9 score of less than 5 in past 6 months     Please review last PHQ-9 score.   PHQ-9 SCORE 9/6/2018 11/8/2018 7/3/2019   PHQ-9 Total Score - - -   PHQ-9 Total Score MyChart - 8 (Mild depression) -   PHQ-9 Total Score 1 8 0     FRANDY-7 SCORE 9/6/2018 11/8/2018 7/3/2019   Total Score - 7 (mild anxiety) -   Total Score 5 7 0           Passed - Medication is active on med list        Passed - Patient is age 18 or older        Passed - Normal serum creatinine on file in past 12 months     Recent Labs   Lab Test 07/03/19  1331   CR 0.88           Passed - Recent (6 mo) or future (30 days) visit within the authorizing provider's specialty     Patient had office visit in the last 6 months or has a visit in the next 30 days with authorizing provider or within the authorizing provider's specialty.  See \"Patient Info\" tab in inbasket, or \"Choose Columns\" in Meds & Orders section of the refill encounter.              "

## 2019-09-26 RX ORDER — VENLAFAXINE HYDROCHLORIDE 75 MG/1
CAPSULE, EXTENDED RELEASE ORAL
Qty: 270 CAPSULE | Refills: 0 | Status: SHIPPED | OUTPATIENT
Start: 2019-09-26 | End: 2019-12-26

## 2019-12-19 DIAGNOSIS — G47.33 OBSTRUCTIVE SLEEP APNEA (ADULT) (PEDIATRIC): Primary | ICD-10-CM

## 2019-12-28 ENCOUNTER — TRANSFERRED RECORDS (OUTPATIENT)
Dept: HEALTH INFORMATION MANAGEMENT | Facility: CLINIC | Age: 49
End: 2019-12-28

## 2020-01-07 DIAGNOSIS — I10 HYPERTENSION GOAL BP (BLOOD PRESSURE) < 140/90: ICD-10-CM

## 2020-01-07 RX ORDER — LISINOPRIL 10 MG/1
10 TABLET ORAL DAILY
Qty: 90 TABLET | Refills: 0 | Status: SHIPPED | OUTPATIENT
Start: 2020-01-07 | End: 2020-04-08

## 2020-01-07 NOTE — TELEPHONE ENCOUNTER
Called patient, left voicemail to return call to clinic    Last Rx sent 02/28/2019 for 6-month supply -- has patient been taking medication as prescribed    Thank You!  Diane Reyes, RN  Triage Nurse

## 2020-01-07 NOTE — TELEPHONE ENCOUNTER
Writer called patient who stated he has consistently taken Lisinopril every day.  Denied break in medication therapy.    Prescription approved per [facility] Refill Protocol.  CALLY BazanN, RN

## 2020-01-07 NOTE — TELEPHONE ENCOUNTER
"Requested Prescriptions   Pending Prescriptions Disp Refills     lisinopril (PRINIVIL/ZESTRIL) 10 MG tablet 90 tablet 1     Sig: Take 1 tablet (10 mg) by mouth daily       ACE Inhibitors (Including Combos) Protocol Passed - 1/7/2020  8:07 AM        Passed - Blood pressure under 140/90 in past 12 months     BP Readings from Last 3 Encounters:   07/03/19 122/74   02/28/19 117/85   02/18/19 134/90                 Passed - Recent (12 mo) or future (30 days) visit within the authorizing provider's specialty     Patient has had an office visit with the authorizing provider or a provider within the authorizing providers department within the previous 12 mos or has a future within next 30 days. See \"Patient Info\" tab in inbasket, or \"Choose Columns\" in Meds & Orders section of the refill encounter.              Passed - Medication is active on med list        Passed - Patient is age 18 or older        Passed - Normal serum creatinine on file in past 12 months     Recent Labs   Lab Test 07/03/19  1331   CR 0.88             Passed - Normal serum potassium on file in past 12 months     Recent Labs   Lab Test 07/03/19  1331   POTASSIUM 4.2             "

## 2020-01-14 ENCOUNTER — OFFICE VISIT (OUTPATIENT)
Dept: FAMILY MEDICINE | Facility: CLINIC | Age: 50
End: 2020-01-14
Payer: COMMERCIAL

## 2020-01-14 VITALS
BODY MASS INDEX: 31.71 KG/M2 | OXYGEN SATURATION: 96 % | SYSTOLIC BLOOD PRESSURE: 104 MMHG | HEART RATE: 115 BPM | WEIGHT: 202 LBS | DIASTOLIC BLOOD PRESSURE: 82 MMHG | TEMPERATURE: 98.7 F | HEIGHT: 67 IN | RESPIRATION RATE: 16 BRPM

## 2020-01-14 DIAGNOSIS — F32.5 DEPRESSION, MAJOR, IN REMISSION (H): ICD-10-CM

## 2020-01-14 DIAGNOSIS — I10 HYPERTENSION GOAL BP (BLOOD PRESSURE) < 140/90: ICD-10-CM

## 2020-01-14 DIAGNOSIS — J45.20 MILD INTERMITTENT ASTHMA WITHOUT COMPLICATION: ICD-10-CM

## 2020-01-14 DIAGNOSIS — Z13.6 CARDIOVASCULAR SCREENING; LDL GOAL LESS THAN 160: ICD-10-CM

## 2020-01-14 DIAGNOSIS — E78.1 HIGH BLOOD TRIGLYCERIDES: ICD-10-CM

## 2020-01-14 DIAGNOSIS — E66.811 OBESITY (BMI 30.0-34.9): ICD-10-CM

## 2020-01-14 DIAGNOSIS — K02.9 DENTAL CARIES: ICD-10-CM

## 2020-01-14 DIAGNOSIS — Z13.1 SCREENING FOR DIABETES MELLITUS: ICD-10-CM

## 2020-01-14 DIAGNOSIS — Z00.00 ROUTINE GENERAL MEDICAL EXAMINATION AT A HEALTH CARE FACILITY: Primary | ICD-10-CM

## 2020-01-14 DIAGNOSIS — Z23 NEEDS FLU SHOT: ICD-10-CM

## 2020-01-14 PROCEDURE — 99214 OFFICE O/P EST MOD 30 MIN: CPT | Mod: 25 | Performed by: FAMILY MEDICINE

## 2020-01-14 PROCEDURE — 82043 UR ALBUMIN QUANTITATIVE: CPT | Performed by: FAMILY MEDICINE

## 2020-01-14 PROCEDURE — 99396 PREV VISIT EST AGE 40-64: CPT | Performed by: FAMILY MEDICINE

## 2020-01-14 PROCEDURE — 96127 BRIEF EMOTIONAL/BEHAV ASSMT: CPT | Performed by: FAMILY MEDICINE

## 2020-01-14 RX ORDER — VENLAFAXINE HYDROCHLORIDE 75 MG/1
225 CAPSULE, EXTENDED RELEASE ORAL DAILY
Qty: 270 CAPSULE | Refills: 3 | Status: SHIPPED | OUTPATIENT
Start: 2020-01-14 | End: 2021-01-18

## 2020-01-14 ASSESSMENT — ENCOUNTER SYMPTOMS
SORE THROAT: 0
MYALGIAS: 1
EYE PAIN: 0
NAUSEA: 0
COUGH: 0
DIARRHEA: 0
HEARTBURN: 0
FREQUENCY: 0
HEADACHES: 1
DYSURIA: 0
ABDOMINAL PAIN: 0
CHILLS: 0
PALPITATIONS: 0
HEMATOCHEZIA: 1
DIZZINESS: 1
WEAKNESS: 0
ARTHRALGIAS: 0
SHORTNESS OF BREATH: 0
HEMATURIA: 0
FEVER: 0
PARESTHESIAS: 0
NERVOUS/ANXIOUS: 0
CONSTIPATION: 0
JOINT SWELLING: 0

## 2020-01-14 ASSESSMENT — PATIENT HEALTH QUESTIONNAIRE - PHQ9
SUM OF ALL RESPONSES TO PHQ QUESTIONS 1-9: 0
SUM OF ALL RESPONSES TO PHQ QUESTIONS 1-9: 0
10. IF YOU CHECKED OFF ANY PROBLEMS, HOW DIFFICULT HAVE THESE PROBLEMS MADE IT FOR YOU TO DO YOUR WORK, TAKE CARE OF THINGS AT HOME, OR GET ALONG WITH OTHER PEOPLE: NOT DIFFICULT AT ALL

## 2020-01-14 ASSESSMENT — MIFFLIN-ST. JEOR: SCORE: 1748.86

## 2020-01-14 NOTE — PROGRESS NOTES
SUBJECTIVE:   CC: Candelario Evangelista is an 49 year old male who presents for preventative health visit.     Healthy Habits:     Getting at least 3 servings of Calcium per day:  NO    Bi-annual eye exam:  NO    Dental care twice a year:  NO    Sleep apnea or symptoms of sleep apnea:  Sleep apnea    Diet:  Regular (no restrictions) and Carbohydrate counting    Frequency of exercise:  None    Taking medications regularly:  Yes    Barriers to taking medications:  None    Medication side effects:  None    PHQ-2 Total Score: 1    Additional concerns today:  No    Hyperlipidemia Follow-Up      Are you regularly taking any medication or supplement to lower your cholesterol?   No    Are you having muscle aches or other side effects that you think could be caused by your cholesterol lowering medication?  No    Hypertension Follow-up      Do you check your blood pressure regularly outside of the clinic? No     Are you following a low salt diet? No    Are your blood pressures ever more than 140 on the top number (systolic) OR more   than 90 on the bottom number (diastolic), for example 140/90? No    Asthma Follow-Up    Was ACT completed today?    Yes    ACT Total Scores 1/14/2020   ACT TOTAL SCORE -   ASTHMA ER VISITS -   ASTHMA HOSPITALIZATIONS -   ACT TOTAL SCORE (Goal Greater than or Equal to 20) 25   In the past 12 months, how many times did you visit the emergency room for your asthma without being admitted to the hospital? 0   In the past 12 months, how many times were you hospitalized overnight because of your asthma? 0       How many days per week do you miss taking your asthma controller medication?  I do not have an asthma controller medication    Please describe any recent triggers for your asthma: cold air    Have you had any Emergency Room Visits, Urgent Care Visits, or Hospital Admissions since your last office visit?  No      Depression Followup  How are you doing with your depression since your last visit? No  change    Are you having other symptoms that might be associated with depression? No    Have you had a significant life event?  OTHER: Other sister paris with kidney cancer last sep/2019     Are you feeling anxious or having panic attacks?   No    Do you have any concerns with your use of alcohol or other drugs? No    Social History     Tobacco Use     Smoking status: Never Smoker     Smokeless tobacco: Never Used     Tobacco comment: second hand at work   Substance Use Topics     Alcohol use: No     Drug use: No     PHQ 11/8/2018 7/3/2019 1/14/2020   PHQ-9 Total Score 8 0 0   Q9: Thoughts of better off dead/self-harm past 2 weeks Several days Not at all Not at all   F/U: Thoughts of suicide or self-harm Yes - -   F/U: Safety concerns No - -     FRANDY-7 SCORE 9/6/2018 11/8/2018 7/3/2019   Total Score - 7 (mild anxiety) -   Total Score 5 7 0     Last PHQ-9 1/14/2020   1.  Little interest or pleasure in doing things 0   2.  Feeling down, depressed, or hopeless 0   3.  Trouble falling or staying asleep, or sleeping too much 0   4.  Feeling tired or having little energy 0   5.  Poor appetite or overeating 0   6.  Feeling bad about yourself 0   7.  Trouble concentrating 0   8.  Moving slowly or restless 0   Q9: Thoughts of better off dead/self-harm past 2 weeks 0   PHQ-9 Total Score 0   Difficulty at work, home, or with people -   In the past two weeks have you had thoughts of suicide or self harm? -   Do you have concerns about your personal safety or the safety of others? -       Asthma Follow-Up    Was ACT completed today?    Yes    ACT Total Scores 1/14/2020   ACT TOTAL SCORE -   ASTHMA ER VISITS -   ASTHMA HOSPITALIZATIONS -   ACT TOTAL SCORE (Goal Greater than or Equal to 20) 25   In the past 12 months, how many times did you visit the emergency room for your asthma without being admitted to the hospital? 0   In the past 12 months, how many times were you hospitalized overnight because of your asthma? 0        How many days per week do you miss taking your asthma controller medication?  I do not have an asthma controller medication    Please describe any recent triggers for your asthma: cold air    Have you had any Emergency Room Visits, Urgent Care Visits, or Hospital Admissions since your last office visit?  No    Today's PHQ-2 Score:   PHQ-2 ( 1999 [employer]) 1/14/2020   Q1: Little interest or pleasure in doing things 1   Q2: Feeling down, depressed or hopeless 0   PHQ-2 Score 1   Q1: Little interest or pleasure in doing things Several days   Q2: Feeling down, depressed or hopeless Not at all   PHQ-2 Score 1       Abuse: Current or Past(Physical, Sexual or Emotional)- No  Do you feel safe in your environment? Yes        Social History     Tobacco Use     Smoking status: Never Smoker     Smokeless tobacco: Never Used     Tobacco comment: second hand at work   Substance Use Topics     Alcohol use: No     If you drink alcohol do you typically have >3 drinks per day or >7 drinks per week? No    Alcohol Use 1/14/2020   Prescreen: >3 drinks/day or >7 drinks/week? No   Prescreen: >3 drinks/day or >7 drinks/week? -   No flowsheet data found.    Last PSA: No results found for: PSA    Reviewed orders with patient. Reviewed health maintenance and updated orders accordingly - Yes  BP Readings from Last 3 Encounters:   01/14/20 104/82   07/03/19 122/74   02/28/19 117/85    Wt Readings from Last 3 Encounters:   01/14/20 91.6 kg (202 lb)   07/03/19 94.5 kg (208 lb 4 oz)   02/28/19 87.1 kg (192 lb)                  Current Outpatient Medications   Medication Sig Dispense Refill     CLINDAMYCIN HCL PO        ibuprofen (ADVIL/MOTRIN) 800 MG tablet Take 1 tablet (800 mg) by mouth every 8 hours as needed for moderate pain 30 tablet 0     lisinopril (PRINIVIL/ZESTRIL) 10 MG tablet Take 1 tablet (10 mg) by mouth daily 90 tablet 0     triamcinolone (KENALOG) 0.1 % external cream Apply topically 2 times daily 45 g 0     venlafaxine  (EFFEXOR-XR) 75 MG 24 hr capsule Take 3 capsules (225 mg) by mouth daily 270 capsule 3     Allergies   Allergen Reactions     Latex Rash     Recent Labs   Lab Test 07/03/19  1331 09/06/18  1245 08/14/18  1718 02/02/17  1141 12/02/13  1534 11/09/11  0742   A1C  --  4.9  --   --   --   --    LDL  --   --   --   --  103  --    HDL  --   --   --   --  36*  --    TRIG  --   --   --   --  252*  --    ALT 58  --  30 26 40  --    CR 0.88  --  1.01 0.94 0.86  --    GFRESTIMATED >90  --  79 86 >90  --    GFRESTBLACK >90  --  >90 >90   GFR Calc   >90  --    POTASSIUM 4.2  --  4.5 3.8 4.3  --    TSH  --   --   --   --   --  1.16        Reviewed and updated as needed this visit by clinical staff  Tobacco  Allergies  Meds  Med Hx  Surg Hx  Fam Hx  Soc Hx        Reviewed and updated as needed this visit by Provider  Fam Hx        Past Medical History:   Diagnosis Date     Anxiety      Depressive disorder      GENERALIZED ANXIETY DIS 8/31/2007     GERD (gastroesophageal reflux disease)      Head injury      Hypertension      Intermittent asthma 2/15/2011     Moderate recurrent major depression (H) 8/31/2007    Sees a Psychiatrist. Does not want to fill out a PHQ - 9     Problem, psychiatric       Past Surgical History:   Procedure Laterality Date     HERNIA REPAIR, INGUINAL RT/LT       HERNIA REPAIR, INGUINAL RT/LT         Review of Systems   Constitutional: Negative for chills and fever.   HENT: Negative for congestion, ear pain, hearing loss and sore throat.    Eyes: Negative for pain and visual disturbance.   Respiratory: Negative for cough and shortness of breath.    Cardiovascular: Negative for chest pain, palpitations and peripheral edema.   Gastrointestinal: Positive for hematochezia. Negative for abdominal pain, constipation, diarrhea, heartburn and nausea.   Genitourinary: Negative for discharge, dysuria, frequency, genital sores, hematuria, impotence and urgency.   Musculoskeletal: Positive for  "myalgias. Negative for arthralgias and joint swelling.   Skin: Negative for rash.   Neurological: Positive for dizziness and headaches. Negative for weakness and paresthesias.   Psychiatric/Behavioral: Negative for mood changes. The patient is not nervous/anxious.      Family History   Problem Relation Age of Onset     C.A.D. Mother      Hypertension Mother         ?     Heart Disease Mother      Mental Illness Mother      Depression Mother      C.A.D. Father      Diabetes Father      Cerebrovascular Disease Father      Hypertension Father         ?     Cancer - colorectal Father      Sleep Apnea Father      Kidney Cancer Sister 58        nonsmoker      OBJECTIVE:   /82 (BP Location: Right arm, Patient Position: Sitting, Cuff Size: Adult Regular)   Pulse 115   Temp 98.7  F (37.1  C) (Tympanic)   Resp 16   Ht 1.708 m (5' 7.25\")   Wt 91.6 kg (202 lb)   SpO2 96%   BMI 31.40 kg/m      Physical Exam  GENERAL: healthy, alert and no distress  EYES: Eyes grossly normal to inspection, PERRL and conjunctivae and sclerae normal  HENT: left TM normal, right TM obstructed by soft wax, nose and mouth without ulcers or lesions. Missing left upper 1st tooth, multiple caries.  NECK: no adenopathy, no asymmetry, masses, or scars and thyroid normal to palpation  RESP: lungs clear to auscultation - no rales, rhonchi or wheezes  CV: regular rate and rhythm, normal S1 S2, no S3 or S4, no murmur, click or rub, no peripheral edema and peripheral pulses strong  ABDOMEN: soft, nontender, no hepatosplenomegaly, no masses and bowel sounds normal  MS: no gross musculoskeletal defects noted, no edema  SKIN: no suspicious lesions or rashes  NEURO: Normal strength and tone, mentation intact and speech normal  PSYCH: mentation appears normal, affect normal/bright  LYMPH: normal ant/post cervical, supraclavicular nodes    ASSESSMENT/PLAN:   1. Routine general medical examination at a health care facility  routine  - DENTAL " "REFERRAL    2. Moderate recurrent major depression (H) in [facility]  PHQ-9 SCORE 11/8/2018 7/3/2019 1/14/2020   PHQ-9 Total Score - - -   PHQ-9 Total Score Vipin 8 (Mild depression) - 0   PHQ-9 Total Score 8 0 0    At goal  The current medical regimen is effective;  continue present plan and medications.  - venlafaxine (EFFEXOR-XR) 75 MG 24 hr capsule; Take 3 capsules (225 mg) by mouth daily  Dispense: 270 capsule; Refill: 3      3. Mild intermittent asthma without complication  At goal  - Asthma Action Plan (AAP)    4. Hypertension goal BP (blood pressure) < 140/90  BP Readings from Last 3 Encounters:   01/14/20 104/82   07/03/19 122/74   02/28/19 117/85    at goal  - Albumin Random Urine Quantitative with Creat Ratio; Future  - **Comprehensive metabolic panel FUTURE anytime; Future  - Albumin Random Urine Quantitative with Creat Ratio    5. CARDIOVASCULAR SCREENING; LDL GOAL LESS THAN 160  LDL Cholesterol Calculated   Date Value Ref Range Status   12/02/2013 103 0 - 129 mg/dL Final     Comment:     LDL Cholesterol is the primary guide to therapy: LDL-cholesterol goal in high   risk patients is <100 mg/dL and in very high risk patients is <70 mg/dL.    at goal  - Lipid panel reflex to direct LDL Non-fasting; Future    6. High blood triglycerides  Triglycerides   Date Value Ref Range Status   12/02/2013 252 (H) 0 - 150 mg/dL Final     Not at goal, needs recheck, return to clinic for fasting labs  - Lipid panel reflex to direct LDL Non-fasting; Future  - **A1C FUTURE anytime; Future    7. Screening for diabetes mellitus  Previous A1C 4.9  - **A1C FUTURE anytime; Future    8. Dental caries  See referral  - DENTAL REFERRAL    9. Needs flu shot  Flu shot offered, I strongly recommended, patient declined today.     10. Obesity (BMI 30.0-34.9)  Encourage regular exercise  Estimated body mass index is 31.4 kg/m  as calculated from the following:    Height as of this encounter: 1.708 m (5' 7.25\").    Weight as of this " "encounter: 91.6 kg (202 lb).  Wt Readings from Last 4 Encounters:   01/14/20 91.6 kg (202 lb)   07/03/19 94.5 kg (208 lb 4 oz)   02/28/19 87.1 kg (192 lb)   02/08/19 87.5 kg (193 lb)           Flu shot offered, recommended, patient declined today.     COUNSELING:   Reviewed preventive health counseling, as reflected in patient instructions    Estimated body mass index is 31.4 kg/m  as calculated from the following:    Height as of this encounter: 1.708 m (5' 7.25\").    Weight as of this encounter: 91.6 kg (202 lb).     Weight management plan: Discussed healthy diet and exercise guidelines     reports that he has never smoked. He has never used smokeless tobacco.    Counseling Resources:  ATP IV Guidelines  Pooled Cohorts Equation Calculator  FRAX Risk Assessment  ICSI Preventive Guidelines  Dietary Guidelines for Americans, 2010  USDA's MyPlate  ASA Prophylaxis  Lung CA Screening    Norma Servin MD  [facility]    Answers for HPI/ROS submitted by the patient on 1/14/2020   Annual Exam:  If you checked off any problems, how difficult have these problems made it for you to do your work, take care of things at home, or get along with other people?: Not difficult at all  PHQ9 TOTAL SCORE: 0    "

## 2020-01-14 NOTE — LETTER
My Asthma Action Plan    Name: Candelario Evangelista   YOB: 1970  Date: 1/14/2020   My doctor: Norma Servin MD   My clinic: [facility]        My Rescue Medicine:   Albuterol inhaler (Proair/Ventolin/Proventil HFA)  2-4 puffs EVERY 4 HOURS as needed. Use a spacer if recommended by your provider.   My Asthma Severity:   Intermittent / Exercise Induced            GREEN ZONE   Good Control    I feel good    No cough or wheeze    Can work, sleep and play without asthma symptoms       Take your asthma control medicine every day.     1. If exercise triggers your asthma, take your rescue medication    15 minutes before exercise or sports, and    During exercise if you have asthma symptoms  2. Spacer to use with inhaler: If you have a spacer, make sure to use it with your inhaler             YELLOW ZONE Getting Worse  I have ANY of these:    I do not feel good    Cough or wheeze    Chest feels tight    Wake up at night   1. Keep taking your Green Zone medications  2. Start taking your rescue medicine:    every 20 minutes for up to 1 hour. Then every 4 hours for 24-48 hours.  3. If you stay in the Yellow Zone for more than 12-24 hours, contact your doctor.  4. If you do not return to the Green Zone in 12-24 hours or you get worse, start taking your oral steroid medicine if prescribed by your provider.           RED ZONE Medical Alert - Get Help  I have ANY of these:    I feel awful    Medicine is not helping    Breathing getting harder    Trouble walking or talking    Nose opens wide to breathe       1. Take your rescue medicine NOW  2. If your provider has prescribed an oral steroid medicine, start taking it NOW  3. Call your doctor NOW  4. If you are still in the Red Zone after 20 minutes and you have not reached your doctor:    Take your rescue medicine again and    Call 911 or go to the emergency room right away    See your regular doctor within 2 weeks of an Emergency Room or Urgent  Care visit for follow-up treatment.          Annual Reminders:  Meet with Asthma Educator,  Flu Shot in the Fall, consider Pneumonia Vaccination for patients with asthma (aged 19 and older).    Pharmacy:    [facility] - [place], [place] - [id number] [facility] - [place], [place] - [id number] [address]   [facility] [place] - [place], [place] - [address]    Electronically signed by Nroma Servin MD   Date: 01/14/20                    Asthma Triggers  How To Control Things That Make Your Asthma Worse    Triggers are things that make your asthma worse.  Look at the list below to help you find your triggers and   what you can do about them. You can help prevent asthma flare-ups by staying away from your triggers.      Trigger                                                          What you can do   Cigarette Smoke  Tobacco smoke can make asthma worse. Do not allow smoking in your home, car or around you.  Be sure no one smokes at a child s day care or school.  If you smoke, ask your health care provider for ways to help you quit.  Ask family members to quit too.  Ask your health care provider for a referral to Quit Plan to help you quit smoking, or call 3-563-525-PLAN.     Colds, Flu, Bronchitis  These are common triggers of asthma. Wash your hands often.  Don t touch your eyes, nose or mouth.  Get a flu shot every year.     Dust Mites  These are tiny bugs that live in cloth or carpet. They are too small to see. Wash sheets and blankets in hot water every week.   Encase pillows and mattress in dust mite proof covers.  Avoid having carpet if you can. If you have carpet, vacuum weekly.   Use a dust mask and HEPA vacuum.   Pollen and Outdoor Mold  Some people are allergic to trees, grass, or weed pollen, or molds. Try to keep your windows closed.  Limit time out doors when pollen count is high.   Ask you health care provider about taking medicine during allergy  season.     Animal Dander  Some people are allergic to skin flakes, urine or saliva from pets with fur or feathers. Keep pets with fur or feathers out of your home.    If you can t keep the pet outdoors, then keep the pet out of your bedroom.  Keep the bedroom door closed.  Keep pets off cloth furniture and away from stuffed toys.     Mice, Rats, and Cockroaches  Some people are allergic to the waste from these pests.   Cover food and garbage.  Clean up spills and food crumbs.  Store grease in the refrigerator.   Keep food out of the bedroom.   Indoor Mold  This can be a trigger if your home has high moisture. Fix leaking faucets, pipes, or other sources of water.   Clean moldy surfaces.  Dehumidify basement if it is damp and smelly.   Smoke, Strong Odors, and Sprays  These can reduce air quality. Stay away from strong odors and sprays, such as perfume, powder, hair spray, paints, smoke incense, paint, cleaning products, candles and new carpet.   Exercise or Sports  Some people with asthma have this trigger. Be active!  Ask your doctor about taking medicine before sports or exercise to prevent symptoms.    Warm up for 5-10 minutes before and after sports or exercise.     Other Triggers of Asthma  Cold air:  Cover your nose and mouth with a scarf.  Sometimes laughing or crying can be a trigger.  Some medicines and food can trigger asthma.

## 2020-01-14 NOTE — PATIENT INSTRUCTIONS
Health Maintenance Due   Topic Date Due     LIPID  12/02/2018     DTAP/TDAP/TD IMMUNIZATION (2 - Td) 02/01/2019     ASTHMA CONTROL TEST  05/08/2019     MICROALBUMIN  08/14/2019     INFLUENZA VACCINE (1) 09/01/2019     ASTHMA ACTION PLAN  09/06/2019     PHQ-9  01/03/2020      PHQ-9 SCORE 11/8/2018 7/3/2019 1/14/2020   PHQ-9 Total Score - - -   PHQ-9 Total Score MyChart 8 (Mild depression) - 0   PHQ-9 Total Score 8 0 0     You are due for a cholesterol check! Please call to schedule a lab appointment. I recommend that you come in first thing in the morning (around 7:30) because you should not have anything to eat or drink except water or black coffe for 12 hours prior to the test.    Preventive Health Recommendations  Male Ages 40 to 49    Yearly exam:             See your health care provider every year in order to  o   Review health changes.   o   Discuss preventive care.    o   Review your medicines if your doctor has prescribed any.    You should be tested each year for STDs (sexually transmitted diseases) if you re at risk.     Have a cholesterol test every 5 years.     Have a colonoscopy (test for colon cancer) if someone in your family has had colon cancer or polyps before age 50.     After age 45, have a diabetes test (fasting glucose). If you are at risk for diabetes, you should have this test every 3 years.      Talk with your health care provider about whether or not a prostate cancer screening test (PSA) is right for you.    Shots: Get a flu shot each year. Get a tetanus shot every 10 years.     Nutrition:    Eat at least 5 servings of fruits and vegetables daily.     Eat whole-grain bread, whole-wheat pasta and brown rice instead of white grains and rice.     Get adequate Calcium and Vitamin D.     Lifestyle    Exercise for at least 150 minutes a week (30 minutes a day, 5 days a week). This will help you control your weight and prevent disease.     Limit alcohol to one drink per day.     No smoking.      Wear sunscreen to prevent skin cancer.     See your dentist every six months for an exam and cleaning.      Recent Labs   Lab Test 12/02/13  1534   CHOL 189   HDL 36*      TRIG 252*   CHOLHDLRATIO 5.3*     Ear wash instructions  You do have some soft wax in your right ear canal.  Use 1/2 hydrogen peroxide/water in your ears.  Lie down on your left side and fill your right ear with the solution. Leave for 5 minutes.  It will bubble and itch. Tilt your head to drain.  Repeat on other side if you'd like.    Do NOT use arianna pins, qtips or anything smaller than your elbow in your ears!!

## 2020-01-14 NOTE — LETTER
My Asthma Action Plan    Name: Candelario Evangelista   YOB: 1970  Date: 1/14/2020   My doctor: Norma Servin MD   My clinic: [facility]        My Rescue Medicine:   Albuterol inhaler (Proair/Ventolin/Proventil HFA)  2-4 puffs EVERY 4 HOURS as needed. Use a spacer if recommended by your provider.   My Asthma Severity:   Intermittent / Exercise Induced  Know your asthma triggers: cold air  None          GREEN ZONE   Good Control    I feel good    No cough or wheeze    Can work, sleep and play without asthma symptoms       Take your asthma control medicine every day.     1. If exercise triggers your asthma, take your rescue medication    15 minutes before exercise or sports, and    During exercise if you have asthma symptoms  2. Spacer to use with inhaler: If you have a spacer, make sure to use it with your inhaler             YELLOW ZONE Getting Worse  I have ANY of these:    I do not feel good    Cough or wheeze    Chest feels tight    Wake up at night   1. Keep taking your Green Zone medications  2. Start taking your rescue medicine:    every 20 minutes for up to 1 hour. Then every 4 hours for 24-48 hours.  3. If you stay in the Yellow Zone for more than 12-24 hours, contact your doctor.  4. If you do not return to the Green Zone in 12-24 hours or you get worse, start taking your oral steroid medicine if prescribed by your provider.           RED ZONE Medical Alert - Get Help  I have ANY of these:    I feel awful    Medicine is not helping    Breathing getting harder    Trouble walking or talking    Nose opens wide to breathe       1. Take your rescue medicine NOW  2. If your provider has prescribed an oral steroid medicine, start taking it NOW  3. Call your doctor NOW  4. If you are still in the Red Zone after 20 minutes and you have not reached your doctor:    Take your rescue medicine again and    Call 911 or go to the emergency room right away    See your regular doctor within 2  weeks of an Emergency Room or Urgent Care visit for follow-up treatment.          Annual Reminders:  Meet with Asthma Educator,  Flu Shot in the Fall, consider Pneumonia Vaccination for patients with asthma (aged 19 and older).    Pharmacy:    [facility] [place], [place] - [id number] [facility] - [place], [place] - [address]  [facility] [place] - [place], [place] - [address] [facility] [facility]    Electronically signed by Norma Servin MD   Date: 01/14/20                    Asthma Triggers  How To Control Things That Make Your Asthma Worse    Triggers are things that make your asthma worse.  Look at the list below to help you find your triggers and   what you can do about them. You can help prevent asthma flare-ups by staying away from your triggers.      Trigger                                                          What you can do   Cigarette Smoke  Tobacco smoke can make asthma worse. Do not allow smoking in your home, car or around you.  Be sure no one smokes at a child s day care or school.  If you smoke, ask your health care provider for ways to help you quit.  Ask family members to quit too.  Ask your health care provider for a referral to Quit Plan to help you quit smoking, or call 5-579-112-PLAN.     Colds, Flu, Bronchitis  These are common triggers of asthma. Wash your hands often.  Don t touch your eyes, nose or mouth.  Get a flu shot every year.     Dust Mites  These are tiny bugs that live in cloth or carpet. They are too small to see. Wash sheets and blankets in hot water every week.   Encase pillows and mattress in dust mite proof covers.  Avoid having carpet if you can. If you have carpet, vacuum weekly.   Use a dust mask and HEPA vacuum.   Pollen and Outdoor Mold  Some people are allergic to trees, grass, or weed pollen, or molds. Try to keep your windows closed.  Limit time out doors when pollen count is high.   Ask you health care provider about  taking medicine during allergy season.     Animal Dander  Some people are allergic to skin flakes, urine or saliva from pets with fur or feathers. Keep pets with fur or feathers out of your home.    If you can t keep the pet outdoors, then keep the pet out of your bedroom.  Keep the bedroom door closed.  Keep pets off cloth furniture and away from stuffed toys.     Mice, Rats, and Cockroaches  Some people are allergic to the waste from these pests.   Cover food and garbage.  Clean up spills and food crumbs.  Store grease in the refrigerator.   Keep food out of the bedroom.   Indoor Mold  This can be a trigger if your home has high moisture. Fix leaking faucets, pipes, or other sources of water.   Clean moldy surfaces.  Dehumidify basement if it is damp and smelly.   Smoke, Strong Odors, and Sprays  These can reduce air quality. Stay away from strong odors and sprays, such as perfume, powder, hair spray, paints, smoke incense, paint, cleaning products, candles and new carpet.   Exercise or Sports  Some people with asthma have this trigger. Be active!  Ask your doctor about taking medicine before sports or exercise to prevent symptoms.    Warm up for 5-10 minutes before and after sports or exercise.     Other Triggers of Asthma  Cold air:  Cover your nose and mouth with a scarf.  Sometimes laughing or crying can be a trigger.  Some medicines and food can trigger asthma.

## 2020-01-15 LAB
CREAT UR-MCNC: 415 MG/DL
MICROALBUMIN UR-MCNC: 62 MG/L
MICROALBUMIN/CREAT UR: 14.94 MG/G CR (ref 0–17)

## 2020-01-15 ASSESSMENT — PATIENT HEALTH QUESTIONNAIRE - PHQ9: SUM OF ALL RESPONSES TO PHQ QUESTIONS 1-9: 0

## 2020-01-15 ASSESSMENT — ASTHMA QUESTIONNAIRES: ACT_TOTALSCORE: 25

## 2020-01-17 NOTE — RESULT ENCOUNTER NOTE
Thank you for getting your urine tested.    Your microalbumen is normal, which is great news. This means you do not have protein in the urine, and that your kidneys are currently functioning well. Keeping blood pressure and blood fats low is the best way to protect your kidneys from damage.    Keep up the good work!    Sincerely,    JACE RODRÍGUEZ MD   1/16/2020

## 2020-01-23 PROBLEM — F32.5 DEPRESSION, MAJOR, IN REMISSION (H): Status: ACTIVE | Noted: 2020-01-23

## 2020-03-15 ENCOUNTER — HEALTH MAINTENANCE LETTER (OUTPATIENT)
Age: 50
End: 2020-03-15

## 2020-04-06 DIAGNOSIS — I10 HYPERTENSION GOAL BP (BLOOD PRESSURE) < 140/90: ICD-10-CM

## 2020-04-08 RX ORDER — LISINOPRIL 10 MG/1
TABLET ORAL
Qty: 90 TABLET | Refills: 2 | Status: SHIPPED | OUTPATIENT
Start: 2020-04-08 | End: 2020-09-24

## 2020-04-08 NOTE — TELEPHONE ENCOUNTER
LOV: 1/14/2020      BP Readings from Last 3 Encounters:   01/14/20 104/82   07/03/19 122/74   02/28/19 117/85       Prescription approved per [facility] Refill Protocol.  Thanks! Gerri Alvarenga RN

## 2020-09-24 ENCOUNTER — OFFICE VISIT (OUTPATIENT)
Dept: FAMILY MEDICINE | Facility: CLINIC | Age: 50
End: 2020-09-24
Payer: COMMERCIAL

## 2020-09-24 VITALS
RESPIRATION RATE: 20 BRPM | DIASTOLIC BLOOD PRESSURE: 104 MMHG | OXYGEN SATURATION: 97 % | SYSTOLIC BLOOD PRESSURE: 149 MMHG | HEART RATE: 104 BPM | TEMPERATURE: 98.7 F | BODY MASS INDEX: 32.62 KG/M2 | WEIGHT: 209.8 LBS

## 2020-09-24 DIAGNOSIS — H61.21 IMPACTED CERUMEN OF RIGHT EAR: ICD-10-CM

## 2020-09-24 DIAGNOSIS — J45.20 MILD INTERMITTENT ASTHMA WITHOUT COMPLICATION: ICD-10-CM

## 2020-09-24 DIAGNOSIS — I10 HYPERTENSION GOAL BP (BLOOD PRESSURE) < 140/90: Primary | ICD-10-CM

## 2020-09-24 DIAGNOSIS — I16.0 HYPERTENSIVE URGENCY: ICD-10-CM

## 2020-09-24 DIAGNOSIS — Z13.6 CARDIOVASCULAR SCREENING; LDL GOAL LESS THAN 160: ICD-10-CM

## 2020-09-24 LAB
BASOPHILS # BLD AUTO: 0 10E9/L (ref 0–0.2)
BASOPHILS NFR BLD AUTO: 0.4 %
DIFFERENTIAL METHOD BLD: ABNORMAL
EOSINOPHIL # BLD AUTO: 0.2 10E9/L (ref 0–0.7)
EOSINOPHIL NFR BLD AUTO: 3 %
ERYTHROCYTE [DISTWIDTH] IN BLOOD BY AUTOMATED COUNT: 15.5 % (ref 10–15)
HCT VFR BLD AUTO: 45.5 % (ref 40–53)
HGB BLD-MCNC: 15.1 G/DL (ref 13.3–17.7)
LYMPHOCYTES # BLD AUTO: 2.3 10E9/L (ref 0.8–5.3)
LYMPHOCYTES NFR BLD AUTO: 28.5 %
MCH RBC QN AUTO: 28.4 PG (ref 26.5–33)
MCHC RBC AUTO-ENTMCNC: 33.2 G/DL (ref 31.5–36.5)
MCV RBC AUTO: 86 FL (ref 78–100)
MONOCYTES # BLD AUTO: 0.8 10E9/L (ref 0–1.3)
MONOCYTES NFR BLD AUTO: 9.7 %
NEUTROPHILS # BLD AUTO: 4.7 10E9/L (ref 1.6–8.3)
NEUTROPHILS NFR BLD AUTO: 58.4 %
PLATELET # BLD AUTO: 246 10E9/L (ref 150–450)
RBC # BLD AUTO: 5.32 10E12/L (ref 4.4–5.9)
WBC # BLD AUTO: 8.1 10E9/L (ref 4–11)

## 2020-09-24 PROCEDURE — 99214 OFFICE O/P EST MOD 30 MIN: CPT | Mod: 25 | Performed by: FAMILY MEDICINE

## 2020-09-24 PROCEDURE — 36415 COLL VENOUS BLD VENIPUNCTURE: CPT | Performed by: FAMILY MEDICINE

## 2020-09-24 PROCEDURE — 80053 COMPREHEN METABOLIC PANEL: CPT | Performed by: FAMILY MEDICINE

## 2020-09-24 PROCEDURE — 80061 LIPID PANEL: CPT | Performed by: FAMILY MEDICINE

## 2020-09-24 PROCEDURE — 85025 COMPLETE CBC W/AUTO DIFF WBC: CPT | Performed by: FAMILY MEDICINE

## 2020-09-24 PROCEDURE — 83721 ASSAY OF BLOOD LIPOPROTEIN: CPT | Mod: 59 | Performed by: FAMILY MEDICINE

## 2020-09-24 PROCEDURE — 69209 REMOVE IMPACTED EAR WAX UNI: CPT | Mod: RT | Performed by: FAMILY MEDICINE

## 2020-09-24 RX ORDER — LISINOPRIL 20 MG/1
20 TABLET ORAL DAILY
Qty: 90 TABLET | Refills: 1 | Status: SHIPPED | OUTPATIENT
Start: 2020-09-24 | End: 2021-03-29

## 2020-09-24 RX ORDER — LISINOPRIL 20 MG/1
10 TABLET ORAL DAILY
Qty: 90 TABLET | Refills: 1 | Status: SHIPPED | OUTPATIENT
Start: 2020-09-24 | End: 2020-09-24

## 2020-09-24 NOTE — NURSING NOTE
Candelario Evangelista is a 50 year old male who presents in clinic with complaint of impacted ear wax (cerumen).  Per the order of Norma Servin, ear wax was removed from right side by flushing with warm water and  Hydroperoxide 3% solution and manual debridement has not been performed. Patient denies pain/dizziness/discharge/drainage  (if yes, stop procedure and huddle with provider).  Ear wax has been successfully removed. (If not, huddle with provider).   Evens Hardy MA

## 2020-09-24 NOTE — RESULT ENCOUNTER NOTE
Hello!  Thank you for getting labs done. Your cbc, or complete blood count, which measures red blood cells (to check for anemia and other vitamin deficiencies) and white blood cells (to check for infection and leukemia) is normal, which is great!  You do not have anemia or an infection.    Your platelets, which reflect liver function and ability to clot, are normal as well.     If you have any questions, please contact the clinic or schedule an appointment with me, thank you!    Sincerely,    JACE RODRÍGUEZ MD   9/24/2020

## 2020-09-24 NOTE — PROGRESS NOTES
Subjective     Candelario Evangelista is a 50 year old male who presents to clinic today for the following health issues:    HPI       Hypertension Follow-up      Do you check your blood pressure regularly outside of the clinic? No     Are you following a low salt diet? No    Are your blood pressures ever more than 140 on the top number (systolic) OR more   than 90 on the bottom number (diastolic), for example 140/90? Yes top number over 200 at chiropractor recently  He's feeling light headed, described as intermittent, started a few hours ago  He has gained weight, thinks this may be contributing to increased BP  He denies chest pain, sob, vision changes  He takes lisinopril in the morning  BP Readings from Last 3 Encounters:   09/24/20 (!) 149/104   01/14/20 104/82   07/03/19 122/74        Asthma Follow-Up    Was ACT completed today?    Yes    ACT Total Scores 9/23/2020   ACT TOTAL SCORE -   ASTHMA ER VISITS -   ASTHMA HOSPITALIZATIONS -   ACT TOTAL SCORE (Goal Greater than or Equal to 20) 24   In the past 12 months, how many times did you visit the emergency room for your asthma without being admitted to the hospital? 0   In the past 12 months, how many times were you hospitalized overnight because of your asthma? 0         How many days per week do you miss taking your asthma controller medication?  I do not have an asthma controller medication    Please describe any recent triggers for your asthma: smoke    Have you had any Emergency Room Visits, Urgent Care Visits, or Hospital Admissions since your last office visit?  No      How many servings of fruits and vegetables do you eat daily?  0-1    On average, how many sweetened beverages do you drink each day (Examples: soda, juice, sweet tea, etc.  Do NOT count diet or artificially sweetened beverages)?   4    How many days per week do you exercise enough to make your heart beat faster? 3 or less    How many minutes a day do you exercise enough to make your heart beat  faster? 9 or less    How many days per week do you miss taking your medication? 0    Review of Systems   Constitutional, HEENT, cardiovascular, pulmonary, GI, , musculoskeletal, neuro, skin, endocrine and psych systems are negative, except as otherwise noted.      Objective    BP (!) 149/104 (BP Location: Left arm, Patient Position: Chair, Cuff Size: Adult Regular)   Pulse 104   Temp 98.7  F (37.1  C) (Oral)   Resp 20   Wt 95.2 kg (209 lb 12.8 oz)   SpO2 97%   BMI 32.62 kg/m    Body mass index is 32.62 kg/m .   Wt Readings from Last 4 Encounters:   09/24/20 95.2 kg (209 lb 12.8 oz)   01/14/20 91.6 kg (202 lb)   07/03/19 94.5 kg (208 lb 4 oz)   02/28/19 87.1 kg (192 lb)     Physical Exam   GENERAL: healthy, alert and no distress  NECK: no adenopathy, no asymmetry, masses, or scars and thyroid normal to palpation  RESP: lungs clear to auscultation - no rales, rhonchi or wheezes  HEENT: right ear impacted with soft, yellow wax. Left ear; some wax present but TM appears normal, ear canal otherwise normal  CV: regular rate and rhythm, normal S1 S2, no S3 or S4, no murmur, click or rub, no peripheral edema and peripheral pulses strong  ABDOMEN: soft, nontender, no hepatosplenomegaly, no masses and bowel sounds normal  MS: no gross musculoskeletal defects noted, no edema    Results for orders placed or performed in visit on 09/24/20 (from the past 24 hour(s))   CBC with platelets and differential   Result Value Ref Range    WBC 8.1 4.0 - 11.0 10e9/L    RBC Count 5.32 4.4 - 5.9 10e12/L    Hemoglobin 15.1 13.3 - 17.7 g/dL    Hematocrit 45.5 40.0 - 53.0 %    MCV 86 78 - 100 fl    MCH 28.4 26.5 - 33.0 pg    MCHC 33.2 31.5 - 36.5 g/dL    RDW 15.5 (H) 10.0 - 15.0 %    Platelet Count 246 150 - 450 10e9/L    % Neutrophils 58.4 %    % Lymphocytes 28.5 %    % Monocytes 9.7 %    % Eosinophils 3.0 %    % Basophils 0.4 %    Absolute Neutrophil 4.7 1.6 - 8.3 10e9/L    Absolute Lymphocytes 2.3 0.8 - 5.3 10e9/L    Absolute  Monocytes 0.8 0.0 - 1.3 10e9/L    Absolute Eosinophils 0.2 0.0 - 0.7 10e9/L    Absolute Basophils 0.0 0.0 - 0.2 10e9/L    Diff Method Automated Method            Assessment & Plan     Hypertension goal BP (blood pressure) < 140/90 with hypertensive urgency (headache)  Will obtain labs to assess for end organ damage, doubled lisinopril today, close follow up, return to clinic 1 week for follow up hypertension, and go to ER if develops chest pain, sob, vision changes.  - Comprehensive metabolic panel  - CBC with platelets and differential  - lisinopril (ZESTRIL) 20 MG tablet; Take 1 tablet (20 mg) by mouth daily    CARDIOVASCULAR SCREENING; LDL GOAL LESS THAN 160  LDL Cholesterol Calculated   Date Value Ref Range Status   12/02/2013 103 0 - 129 mg/dL Final     Comment:     LDL Cholesterol is the primary guide to therapy: LDL-cholesterol goal in high   risk patients is <100 mg/dL and in very high risk patients is <70 mg/dL.    recheck due  - Lipid panel reflex to direct LDL Fasting    Mild intermittent asthma without complication  At goal    Impacted cerumen of right ear  Successful irrigation today  - REMOVE IMPACTED CERUMEN     Return in about 1 week (around 10/1/2020) for Hypertension follow up.    Norma Servin MD  [facility]

## 2020-09-25 LAB
ALBUMIN SERPL-MCNC: 3.9 G/DL (ref 3.4–5)
ALP SERPL-CCNC: 151 U/L (ref 40–150)
ALT SERPL W P-5'-P-CCNC: 53 U/L (ref 0–70)
ANION GAP SERPL CALCULATED.3IONS-SCNC: 8 MMOL/L (ref 3–14)
AST SERPL W P-5'-P-CCNC: 38 U/L (ref 0–45)
BILIRUB SERPL-MCNC: 0.4 MG/DL (ref 0.2–1.3)
BUN SERPL-MCNC: 14 MG/DL (ref 7–30)
CALCIUM SERPL-MCNC: 9.4 MG/DL (ref 8.5–10.1)
CHLORIDE SERPL-SCNC: 106 MMOL/L (ref 94–109)
CHOLEST SERPL-MCNC: 238 MG/DL
CO2 SERPL-SCNC: 21 MMOL/L (ref 20–32)
CREAT SERPL-MCNC: 0.97 MG/DL (ref 0.66–1.25)
GFR SERPL CREATININE-BSD FRML MDRD: >90 ML/MIN/{1.73_M2}
GLUCOSE SERPL-MCNC: 99 MG/DL (ref 70–99)
HDLC SERPL-MCNC: 38 MG/DL
LDLC SERPL CALC-MCNC: ABNORMAL MG/DL
LDLC SERPL DIRECT ASSAY-MCNC: 115 MG/DL
NONHDLC SERPL-MCNC: 200 MG/DL
POTASSIUM SERPL-SCNC: 4.5 MMOL/L (ref 3.4–5.3)
PROT SERPL-MCNC: 7.6 G/DL (ref 6.8–8.8)
SODIUM SERPL-SCNC: 135 MMOL/L (ref 133–144)
TRIGL SERPL-MCNC: 509 MG/DL

## 2020-10-01 ENCOUNTER — OFFICE VISIT (OUTPATIENT)
Dept: FAMILY MEDICINE | Facility: CLINIC | Age: 50
End: 2020-10-01
Payer: COMMERCIAL

## 2020-10-01 VITALS
SYSTOLIC BLOOD PRESSURE: 130 MMHG | DIASTOLIC BLOOD PRESSURE: 84 MMHG | BODY MASS INDEX: 32.02 KG/M2 | RESPIRATION RATE: 20 BRPM | OXYGEN SATURATION: 99 % | WEIGHT: 206 LBS | HEART RATE: 95 BPM | TEMPERATURE: 98.5 F

## 2020-10-01 DIAGNOSIS — F33.1 MODERATE RECURRENT MAJOR DEPRESSION (H): ICD-10-CM

## 2020-10-01 DIAGNOSIS — K64.4 EXTERNAL HEMORRHOIDS: ICD-10-CM

## 2020-10-01 DIAGNOSIS — I10 HYPERTENSION GOAL BP (BLOOD PRESSURE) < 140/90: Primary | ICD-10-CM

## 2020-10-01 PROCEDURE — 99213 OFFICE O/P EST LOW 20 MIN: CPT | Performed by: FAMILY MEDICINE

## 2020-10-01 NOTE — PROGRESS NOTES
Subjective     Candelario Evangelista is a 50 year old male who presents to clinic today for the following health issues:    HPI         Answers for HPI/ROS submitted by the patient on 10/1/2020   Chronic problems general questions HPI Form  Do you check your blood pressure regularly outside of the clinic?: Yes  Are your blood pressures ever more than 140 on the top number (systolic) OR more than 90 on the bottom number (diastolic)? (For example, greater than 140/90): Yes 140/99 156/90  Are you following a low salt diet?: No        How many servings of fruits and vegetables do you eat daily?  0-1    On average, how many sweetened beverages do you drink each day (Examples: soda, juice, sweet tea, etc.  Do NOT count diet or artificially sweetened beverages)?   3    How many days per week do you exercise enough to make your heart beat faster? Walking at work    How many minutes a day do you exercise enough to make your heart beat faster? 60 or more    How many days per week do you miss taking your medication? 0    Depression Followup    How are you doing with your depression since your last visit? Stable    Are you having other symptoms that might be associated with depression? No    Have you had a significant life event?  No     Are you feeling anxious or having panic attacks?   No    Do you have any concerns with your use of alcohol or other drugs? No    Social History     Tobacco Use     Smoking status: Never Smoker     Smokeless tobacco: Never Used     Tobacco comment: second hand at work   Substance Use Topics     Alcohol use: No     Drug use: No     PHQ 7/3/2019 1/14/2020 9/23/2020   PHQ-9 Total Score 0 0 0   Q9: Thoughts of better off dead/self-harm past 2 weeks Not at all Not at all Not at all   F/U: Thoughts of suicide or self-harm - - -   F/U: Safety concerns - - -     FRANDY-7 SCORE 11/8/2018 7/3/2019 9/23/2020   Total Score 7 (mild anxiety) - -   Total Score 7 0 1          Past Medical History:   Diagnosis Date      Anxiety      Depressive disorder      GENERALIZED ANXIETY DIS 8/31/2007     GERD (gastroesophageal reflux disease)      Head injury      Hypertension      Intermittent asthma 2/15/2011     Moderate recurrent major depression (H) 8/31/2007    Sees a Psychiatrist. Does not want to fill out a PHQ - 9     Problem, psychiatric            Allergies   Allergen Reactions     Latex Rash        Review of Systems   Constitutional, HEENT, cardiovascular, pulmonary, GI, , musculoskeletal, neuro, skin, endocrine and psych systems are negative, except as otherwise noted.      Objective    /84 (BP Location: Left arm, Patient Position: Sitting, Cuff Size: Adult Large)   Pulse 95   Temp 98.5  F (36.9  C) (Oral)   Resp 20   Wt 93.4 kg (206 lb)   SpO2 99%   BMI 32.02 kg/m    Body mass index is 32.02 kg/m .  Physical Exam   GENERAL: healthy, alert and no distress  NECK: no adenopathy, no asymmetry, masses, or scars and thyroid normal to palpation  RESP: lungs clear to auscultation - no rales, rhonchi or wheezes  CV: regular rate and rhythm, normal S1 S2, no S3 or S4, no murmur, click or rub, no peripheral edema and peripheral pulses strong  ABDOMEN: soft, nontender, no hepatosplenomegaly, no masses and bowel sounds normal  MS: no gross musculoskeletal defects noted, no edema          Assessment & Plan     Hypertension goal BP (blood pressure) < 140/90  BP Readings from Last 3 Encounters:   10/01/20 130/84   09/24/20 (!) 149/104   01/14/20 104/82    At goal  The current medical regimen is effective;  continue present plan and medications.      Moderate recurrent major depression (H)  PHQ 7/3/2019 1/14/2020 9/23/2020   PHQ-9 Total Score 0 0 0   Q9: Thoughts of better off dead/self-harm past 2 weeks Not at all Not at all Not at all   F/U: Thoughts of suicide or self-harm - - -   F/U: Safety concerns - - -     At goal  The current medical regimen is effective;  continue present plan and medications.      External  "hemorrhoids  Candelario mentions recurrent problems/sx at end of visit, and would like to follow up with specialist, see referral  - COLORECTAL SURGERY REFERRAL     BMI:   Estimated body mass index is 32.02 kg/m  as calculated from the following:    Height as of 1/14/20: 1.708 m (5' 7.25\").    Weight as of this encounter: 93.4 kg (206 lb).            Return in about 15 weeks (around 1/14/2021) for preventive visit (wellness/annual physical exam).    Norma Servin MD  [facility]      "

## 2020-10-01 NOTE — PATIENT INSTRUCTIONS
BP Readings from Last 3 Encounters:   10/01/20 130/84   09/24/20 (!) 149/104   01/14/20 104/82      Great job!    Sincerely,  Dr. Norma Servin MD  10/1/2020

## 2020-10-06 NOTE — RESULT ENCOUNTER NOTE
Hello!  It was a pleasure to see you in clinic!  Thank you for getting labs done.     Your cholesterol is abnormal. Your total cholesterol is high and your triglycerides are VERY high.  Your HDL is too low.    Desired or goal levels are:  CHOLESTEROL: Desirable is less than 200.  HDL (Good Cholesterol): Desirable is greater than 40 in men and greater than 50 in women.  LDL (Bad Cholesterol): Desirable is less than 130 or less than 100 in patients with diabetes or vascular disease. For some patients with diabetes or vascular disease, the desireable LDL is less that 70.  TRIGLYCERIDES: Desirable is less than 150.    As you may know, abnormal cholesterol is one factor that increases your risk for heart disease and stroke. You can improve your cholesterol by controlling the amount and type of fat you eat and by increasing your daily activity level.    Here are some ways to improve your nutrition (adapted from the American Academy of [employer] handout):  Eat less fat (especially butter, Crisco and other saturated fats)  Buy lean cuts of meat; reduce your portions of red meat or substitute poultry or fish  Use skim milk and low-fat dairy products  Eat no more than 4 egg yolks per week  Avoid fried or fast foods that are high in fat  Eat more fruits and vegetables    Also consider starting or increasing your aerobic activity; this is the best way to improve HDL (good) cholesterol. Exercise for at least 30 min 5 times per week, and lift weights 2-3 times per week.    Everything else is normal.    Your cbc, or complete blood count, which measures red blood cells (to check for anemia and other vitamin deficiencies) and white blood cells (to check for infection and leukemia) is normal, which is great!  Your platelets, which reflect liver function and ability to clot, are normal as well.     The testing of your blood sugar, kidney function, liver function and electrolytes was normal.     Sincerely,  Dr. Norma Servin,  MD  10/6/2020

## 2020-10-14 ENCOUNTER — TRANSFERRED RECORDS (OUTPATIENT)
Dept: HEALTH INFORMATION MANAGEMENT | Facility: CLINIC | Age: 50
End: 2020-10-14

## 2020-10-14 ENCOUNTER — NURSE TRIAGE (OUTPATIENT)
Dept: NURSING | Facility: CLINIC | Age: 50
End: 2020-10-14

## 2020-10-14 LAB
ALT SERPL-CCNC: 41 IU/L (ref 8–45)
AST SERPL-CCNC: 35 IU/L (ref 2–40)
CREAT SERPL-MCNC: 0.91 MG/DL (ref 0.72–1.25)
GFR SERPL CREATININE-BSD FRML MDRD: >60 ML/MIN/1.73M2
GLUCOSE SERPL-MCNC: 89 MG/DL (ref 65–100)
POTASSIUM SERPL-SCNC: 5.3 MMOL/L (ref 3.5–5)

## 2020-10-14 NOTE — TELEPHONE ENCOUNTER
Triage Call: Pt went to the ED today due to a BP on 210/120. ED stabilized patient.   Currently Dizziness and light headed today but improved since prior to going to the ED. Pt is calling to make an appointment with his PCP for follow up ED visit. Pt was connected with scheduling to make an appointment.     Diann Shirley RN Nursing Advisor 10/14/2020 5:06 PM      Reason for Disposition    Requesting regular office appointment     Post-ED visit appointment    Protocols used: INFORMATION ONLY CALL-A-AH

## 2020-10-15 ENCOUNTER — OFFICE VISIT (OUTPATIENT)
Dept: FAMILY MEDICINE | Facility: CLINIC | Age: 50
End: 2020-10-15
Payer: COMMERCIAL

## 2020-10-15 VITALS
WEIGHT: 207.13 LBS | BODY MASS INDEX: 32.2 KG/M2 | HEART RATE: 103 BPM | OXYGEN SATURATION: 95 % | TEMPERATURE: 98.1 F | SYSTOLIC BLOOD PRESSURE: 112 MMHG | DIASTOLIC BLOOD PRESSURE: 87 MMHG

## 2020-10-15 DIAGNOSIS — I10 HYPERTENSION GOAL BP (BLOOD PRESSURE) < 140/90: Primary | ICD-10-CM

## 2020-10-15 PROCEDURE — 36415 COLL VENOUS BLD VENIPUNCTURE: CPT | Performed by: PHYSICIAN ASSISTANT

## 2020-10-15 PROCEDURE — 99213 OFFICE O/P EST LOW 20 MIN: CPT | Performed by: PHYSICIAN ASSISTANT

## 2020-10-15 PROCEDURE — 80048 BASIC METABOLIC PNL TOTAL CA: CPT | Performed by: PHYSICIAN ASSISTANT

## 2020-10-15 NOTE — PATIENT INSTRUCTIONS
Angel Palomino,  Thanks for visiting with me today.  To recap:  - Take your blood pressure medication at the same time every day. If morning works best for you please stick with that routine.   - For the next few weeks please check your blood pressure at least twice daily. Give yourself some time to sit and rest before taking your blood pressure. Keep a log of your blood pressure readings and bring to your follow up appointment with Dr. Servin.  - Push fluids. Drink more water during the day. Use your urine as a guide -- if it is dark yellow please drink more water.   - If your blood pressure is elevated and you are feeling unwell please call the nurse line at [phone] to speak to a triage nurse.  - If your blood pressure is greater than 180/110 please go to the emergency department. If you develop chest pain please go to the emergency department.   Thank you,  Arpit Carreon

## 2020-10-15 NOTE — PROGRESS NOTES
Subjective     Candelario Evangelista is a 50 year old male who presents to clinic today for the following health issues:    HPI     ED Follow-up / Hypertension Follow-up      Do you check your blood pressure regularly outside of the clinic? Yes 210/120 at work yesterday. Pt went [facility]    Are you following a low salt diet? No    Are your blood pressures ever more than 140 on the top number (systolic) OR more   than 90 on the bottom number (diastolic), for example 140/90? Yes       How many servings of fruits and vegetables do you eat daily?  2-3    On average, how many sweetened beverages do you drink each day (Examples: soda, juice, sweet tea, etc.  Do NOT count diet or artificially sweetened beverages)? At least 5 cans pop at least per day     How many days per week do you exercise enough to make your heart beat faster? No    How many minutes a day do you exercise enough to make your heart beat faster? N/A    How many days per week do you miss taking your medication? Rarely once or twice     10/14/2020 was at work at [employer]  [place] dizzy and had  take his BP  Reports reading was 210/120  EMS was called   EMS rechecked BP was 183/100s  Associated symptoms included dizziness and lightheadedness  No chest pain, nausea, vomiting, diaphoresis  Was brought to the ED   His EKG was SR rate 74 normal axis nonspecific ST-T wave changes no evidence of acute ischemic changes   CBC, bmp, and cardiac enzymes within normal limits  MRI of brain without acute findings   Symptoms resolved while in ED  Discharged in stable condition  Since discharge has felt tired but attributes to long day  No dizziness, syncope, lightheadedness, nausea, vomiting, chest pain, or diaphoresis  Reports he is not great about drinking water during the day  Just picked up a BP cuff to monitor BP at home      Review of Systems   Constitutional, HEENT, cardiovascular, pulmonary, gi and gu systems are negative,  except as otherwise noted.      Objective    /87   Pulse 103   Temp 98.1  F (36.7  C) (Oral)   Wt 94 kg (207 lb 2 oz)   SpO2 95%   BMI 32.20 kg/m    Body mass index is 32.2 kg/m .  Physical Exam  Vitals signs and nursing note reviewed.   Constitutional:       Appearance: Normal appearance.   HENT:      Head: Normocephalic and atraumatic.   Eyes:      Conjunctiva/sclera: Conjunctivae normal.   Cardiovascular:      Rate and Rhythm: Normal rate and regular rhythm.      Heart sounds: Normal heart sounds.   Pulmonary:      Effort: Pulmonary effort is normal.      Breath sounds: Normal breath sounds.   Abdominal:      General: Bowel sounds are normal.      Palpations: Abdomen is soft.   Skin:     General: Skin is warm and dry.   Neurological:      Mental Status: He is alert and oriented to person, place, and time.   Psychiatric:         Mood and Affect: Mood normal.         Behavior: Behavior normal.            No results found for this or any previous visit (from the past 24 hour(s)).        Assessment & Plan   Problem List Items Addressed This Visit        Circulatory    Hypertension goal BP (blood pressure) < 140/90 - Primary    Relevant Orders    Basic metabolic panel  (Ca, Cl, CO2, Creat, Gluc, K, Na, BUN) (Completed)      ED visit notes reviewed   BP in clinic today is normal at 112/87  Dose of Lisinopril was recently increased to 20 mg daily. No further changes made today.  Discussed importance of maintaining hydration throughout the day -- patient admits he is not always great about this.   Will push fluids and keep log of BP over the coming weeks and follow up with PCP for recheck.  If BP persistently elevated over 140/90 will contact clinic and would consider increasing Lisinopril dose to 40 mg at that time  If develops BP >=180/110 or chest pain/shortness of breath will go the ED for further evaluation and care   Discussed nurse line if he has questions  Take medication at same time daily to avoid  "missing doses   Patient in agreement with this plan       BMI:   Estimated body mass index is 32.2 kg/m  as calculated from the following:    Height as of 1/14/20: 1.708 m (5' 7.25\").    Weight as of this encounter: 94 kg (207 lb 2 oz).   Weight management plan: Discussed healthy diet and exercise guidelines           Patient Instructions   Angel Palomino,  Thanks for visiting with me today.  To recap:  - Take your blood pressure medication at the same time every day. If morning works best for you please stick with that routine.   - For the next few weeks please check your blood pressure at least twice daily. Give yourself some time to sit and rest before taking your blood pressure. Keep a log of your blood pressure readings and bring to your follow up appointment with Dr. Servin.  - Push fluids. Drink more water during the day. Use your urine as a guide -- if it is dark yellow please drink more water.   - If your blood pressure is elevated and you are feeling unwell please call the nurse line at [phone] to speak to a triage nurse.  - If your blood pressure is greater than 180/110 please go to the emergency department. If you develop chest pain please go to the emergency department.   Thank you,  Arpit Carreon     Return in 2 weeks (on 10/29/2020) for BP Recheck, Routine Visit.    Arpit Carreon PA-C  [facility]      "

## 2020-10-16 LAB
ANION GAP SERPL CALCULATED.3IONS-SCNC: 6 MMOL/L (ref 3–14)
BUN SERPL-MCNC: 16 MG/DL (ref 7–30)
CALCIUM SERPL-MCNC: 9.6 MG/DL (ref 8.5–10.1)
CHLORIDE SERPL-SCNC: 107 MMOL/L (ref 94–109)
CO2 SERPL-SCNC: 25 MMOL/L (ref 20–32)
CREAT SERPL-MCNC: 1.05 MG/DL (ref 0.66–1.25)
GFR SERPL CREATININE-BSD FRML MDRD: 83 ML/MIN/{1.73_M2}
GLUCOSE SERPL-MCNC: 86 MG/DL (ref 70–99)
POTASSIUM SERPL-SCNC: 5.1 MMOL/L (ref 3.4–5.3)
SODIUM SERPL-SCNC: 138 MMOL/L (ref 133–144)

## 2020-12-16 ENCOUNTER — NURSE TRIAGE (OUTPATIENT)
Dept: NURSING | Facility: CLINIC | Age: 50
End: 2020-12-16

## 2020-12-16 NOTE — TELEPHONE ENCOUNTER
PCP is not available in clinic today. Given his very elevated blood pressure accompanied by symptoms (hypertensive emergency), let him know that I agree that he needs to go to the ER.  Eboni Alfredo M.D.

## 2020-12-16 NOTE — TELEPHONE ENCOUNTER
Bp acting up again 165/137 p60    Spinning feeling.    Takes lisinopril at night and took it last night.    No chest pain.  To dizzy to drive.  Cannot get to ED.  Asking if he can take an extra dose of his BP medication.  Still has a bill from ED from October he is paying.  20 miles to ED. BP now 170/130.    Asking again fr pcp call back.  Triage again recommending ER but will forward message to pcp.    Twyla Lamb RN  [facility] Nurse Advisor        Additional Information    Negative: Sounds like a life-threatening emergency to the triager    Systolic BP >= 160 OR Diastolic >= 100, and any cardiac or neurologic symptoms (e.g., chest pain, difficulty breathing, unsteady gait, blurred vision)    Negative: Pregnant > 20 weeks or postpartum (< 6 weeks after delivery) and new hand or face swelling    Negative: Pregnant > 20 weeks and BP > 140/90    Protocols used: HIGH BLOOD PRESSURE-A-OH

## 2020-12-16 NOTE — TELEPHONE ENCOUNTER
Writer relayed message to patient. Patient states he was just in ER on 10/14. Writer stated that that is a separate event. The symptoms he is having now is it's own event and the advise is to seek care in the ER. Patient states he will monitor symptoms and go to ER if worsening. Writer advised that that is not what is advised but patient is the ultimate deciding factor.   Patient asked if he could get an extra dose of his BP medication. Writer stated that this was declined as patient is advised to go to ER.    Patient is lying down and will decide at a later time.  Patient requested this be sent to Dr. Servin for FYI--please see communication below.    Thanks! Gerri Alvarenga RN

## 2021-01-18 ENCOUNTER — NURSE TRIAGE (OUTPATIENT)
Dept: NURSING | Facility: CLINIC | Age: 51
End: 2021-01-18

## 2021-01-18 DIAGNOSIS — F32.5 DEPRESSION, MAJOR, IN REMISSION (H): ICD-10-CM

## 2021-01-18 RX ORDER — VENLAFAXINE HYDROCHLORIDE 75 MG/1
225 CAPSULE, EXTENDED RELEASE ORAL DAILY
Qty: 270 CAPSULE | Refills: 0 | Status: SHIPPED | OUTPATIENT
Start: 2021-01-18 | End: 2021-04-20

## 2021-01-18 NOTE — TELEPHONE ENCOUNTER
Sari refill authorized per [facility] refill protocol.    Patient informed of medication refill and to please follow up as scheduled with provider this week.    Patient verbalized understanding and in agreement with plan.  GAVIN Bazan, RN  [facility]      PHQ 7/3/2019 1/14/2020 9/23/2020   PHQ-9 Total Score 0 0 0   Q9: Thoughts of better off dead/self-harm past 2 weeks Not at all Not at all Not at all   F/U: Thoughts of suicide or self-harm - - -   F/U: Safety concerns - - -

## 2021-01-18 NOTE — TELEPHONE ENCOUNTER
Triage Call:    -Patient is almost out of his Venlafaxine and has 4 days left.  -Patient is requesting a 90 day supply of his medication.   -Patient is due for a routine physical as last physical was 1/14/2020.   -Patient has been seen for recently for virtual appointments with PCP related to his blood pressure and dizziness.   -Patient mentioned he was feeling lightheaded this morning, mild, intermittent.  -Patient states currently he is not having any symptoms.   -Patient states he is unable to check his blood pressure at this time with RN on the phone.   -RN triaged patients sx and where he is at currently.   -Patient denies any chest pain, shortness of breath, weakness/numbeness in extremities and face, loss of speech or garbled speech, heart palpitations, nausea, vomiting, diarrhea, urinary problems or issues with stool, fevers, current dizziness, loss of vision or double vision.    -Per protocol regarding his sx of dizziness, lightheadedness, he is advised to be seen with PCP today in office. Patient is refusing disposition and states he is currently not having any sx. Patient stated that he has off this Wednesday from work and can come in for an office visit at that time. RN advised to patient if he is having any new or worsening sx from now until he is seen on Wednesday to call back nurse line (number given).      -Patient is scheduled for this Wednesday with Dr. Haro for a Routine office visit and to discuss his blood pressure/BP medications and ongoing sx that are intermittent.    Routing to PCP to advise on refill request for medication. Pharmacy and medication pended. Patient is requesting a 90 day supply and is scheduled for office visit this Wednesday.       -Call back patient at [phone]   -Okay to leave a detailed message? YES      Additional Information    Negative: Shock suspected (e.g., cold/pale/clammy skin, too weak to stand, low BP, rapid pulse)    Negative: Difficult to awaken or acting  confused (e.g., disoriented, slurred speech)    Negative: Fainted, and still feels dizzy afterwards    Negative: Severe difficulty breathing (e.g., struggling for each breath, speaks in single words)    Negative: Overdose (accidental or intentional) of medications    Negative: New neurologic deficit that is present now: * Weakness of the face, arm, or leg on one side of the body * Numbness of the face, arm, or leg on one side of the body * Loss of speech or garbled speech    Negative: Heart beating < 50 beats per minute OR > 140 beats per minute    Negative: Sounds like a life-threatening emergency to the triager    Negative: Chest pain    Negative: Rectal bleeding, bloody stool, or tarry-black stool    Negative: Vomiting is the main symptom    Negative: Diarrhea is the main symptom    Negative: Headache is the main symptom    Negative: Heat exhaustion suspected (i.e., dehydration from heat exposure)    Negative: Patient states that he/she is having an anxiety/panic attack    Negative: SEVERE dizziness (e.g., unable to stand, requires support to walk, feels like passing out now)    Negative: SEVERE headache or neck pain    Negative: Spinning or tilting sensation (vertigo) present now and one or more stroke risk factors (i.e., hypertension, diabetes, prior stroke/TIA, heart attack, age over 60) (Exception: prior physician evaluation for this AND no different/worse than usual)    Negative: Loss of vision or double vision    Negative: Extra heart beats OR irregular heart beating (i.e., 'palpitations')    Negative: Difficulty breathing    Negative: Drinking very little and has signs of dehydration (e.g., no urine > 12 hours, very dry mouth, very lightheaded)    Negative: Follows bleeding (e.g., stomach, rectum, vagina) (Exception: became dizzy from sight of small amount blood)    Negative: Patient sounds very sick or weak to the triager    Negative: Vomiting occurs with dizziness    Negative: Patient wants to be seen     Negative: Lightheadedness (dizziness) present now, after 2 hours of rest and fluids    Negative: Spinning or tilting sensation (vertigo) present now    Negative: Fever > 103 F (39.4 C)    Negative: Fever > 100.0 F (37.8 C) and has diabetes mellitus or a weak immune system (e.g., HIV positive, cancer chemotherapy, organ transplant, splenectomy, chronic steroids)    Taking a medicine that could cause dizziness (e.g., blood pressure medications, diuretics)    Protocols used: DIZZINESS-A-OH    COVID 19 Nurse Triage Plan/Patient Instructions    Please be aware that novel coronavirus (COVID-19) may be circulating in the community. If you develop symptoms such as fever, cough, or SOB or if you have concerns about the presence of another infection including coronavirus (COVID-19), please contact your health care provider or visit [email or web address].     Disposition/Instructions    In-Person Visit with provider recommended. Reference Visit Selection Guide.    Thank you for taking steps to prevent the spread of this virus.  o Limit your contact with others.  o Wear a simple mask to cover your cough.  o Wash your hands well and often.    Resources    [facility]: About COVID-19: [email or web address]    CDC: What to Do If You're Sick: www.cdc.gov/coronavirus/2019-ncov/about/steps-when-sick.html    CDC: Ending Home Isolation: www.cdc.gov/coronavirus/2019-ncov/hcp/disposition-in-home-patients.html     CDC: Caring for Someone: www.cdc.gov/coronavirus/2019-ncov/if-you-are-sick/care-for-someone.html     [facility]: Interim Guidance for Hospital Discharge to Home: www.health.[facility].[place]/diseases/coronavirus/hcp/hospdischarge.pdf    [facility] clinical trials (COVID-19 research studies): [email or web address]/umn-clinical-trials     Below are the COVID-19 hotlines at the [place] Department of [facility] ([facility]). Interpreters are available.   o For health questions: Call 419-082-4719 or 1-399.486.7235 (7 a.m. to 7  p.m.)  o For questions about schools and childcare: Call 839-879-2222 or 1-467.304.4633 (7 a.m. to 7 p.m.)

## 2021-01-20 ENCOUNTER — OFFICE VISIT (OUTPATIENT)
Dept: FAMILY MEDICINE | Facility: CLINIC | Age: 51
End: 2021-01-20
Payer: COMMERCIAL

## 2021-01-20 VITALS
SYSTOLIC BLOOD PRESSURE: 122 MMHG | DIASTOLIC BLOOD PRESSURE: 80 MMHG | RESPIRATION RATE: 18 BRPM | BODY MASS INDEX: 32.05 KG/M2 | HEIGHT: 67 IN | HEART RATE: 91 BPM | WEIGHT: 204.2 LBS | OXYGEN SATURATION: 95 % | TEMPERATURE: 98.4 F

## 2021-01-20 DIAGNOSIS — K08.9 POOR DENTITION: ICD-10-CM

## 2021-01-20 DIAGNOSIS — Z00.00 ROUTINE GENERAL MEDICAL EXAMINATION AT A HEALTH CARE FACILITY: Primary | ICD-10-CM

## 2021-01-20 DIAGNOSIS — I10 HYPERTENSION GOAL BP (BLOOD PRESSURE) < 140/90: ICD-10-CM

## 2021-01-20 DIAGNOSIS — R42 DIZZINESS: ICD-10-CM

## 2021-01-20 LAB
ANION GAP SERPL CALCULATED.3IONS-SCNC: 6 MMOL/L (ref 3–14)
BUN SERPL-MCNC: 9 MG/DL (ref 7–30)
CALCIUM SERPL-MCNC: 8.7 MG/DL (ref 8.5–10.1)
CHLORIDE SERPL-SCNC: 107 MMOL/L (ref 94–109)
CO2 SERPL-SCNC: 26 MMOL/L (ref 20–32)
CREAT SERPL-MCNC: 0.94 MG/DL (ref 0.66–1.25)
GFR SERPL CREATININE-BSD FRML MDRD: >90 ML/MIN/{1.73_M2}
GLUCOSE SERPL-MCNC: 101 MG/DL (ref 70–99)
HBA1C MFR BLD: 4.8 % (ref 0–5.6)
HCV AB SERPL QL IA: NONREACTIVE
POTASSIUM SERPL-SCNC: 4.3 MMOL/L (ref 3.4–5.3)
SODIUM SERPL-SCNC: 139 MMOL/L (ref 133–144)

## 2021-01-20 PROCEDURE — 83036 HEMOGLOBIN GLYCOSYLATED A1C: CPT | Performed by: FAMILY MEDICINE

## 2021-01-20 PROCEDURE — 86803 HEPATITIS C AB TEST: CPT | Performed by: FAMILY MEDICINE

## 2021-01-20 PROCEDURE — 99396 PREV VISIT EST AGE 40-64: CPT | Performed by: FAMILY MEDICINE

## 2021-01-20 PROCEDURE — 82043 UR ALBUMIN QUANTITATIVE: CPT | Performed by: FAMILY MEDICINE

## 2021-01-20 PROCEDURE — 36415 COLL VENOUS BLD VENIPUNCTURE: CPT | Performed by: FAMILY MEDICINE

## 2021-01-20 PROCEDURE — 80048 BASIC METABOLIC PNL TOTAL CA: CPT | Performed by: FAMILY MEDICINE

## 2021-01-20 RX ORDER — ALBUTEROL SULFATE 90 UG/1
AEROSOL, METERED RESPIRATORY (INHALATION)
COMMUNITY
Start: 2020-01-30

## 2021-01-20 RX ORDER — VENLAFAXINE HYDROCHLORIDE 37.5 MG/1
37.5 CAPSULE, EXTENDED RELEASE ORAL
COMMUNITY
End: 2021-01-20

## 2021-01-20 RX ORDER — LISINOPRIL 5 MG/1
10 TABLET ORAL
COMMUNITY
End: 2021-01-20

## 2021-01-20 ASSESSMENT — MIFFLIN-ST. JEOR: SCORE: 1749.88

## 2021-01-20 NOTE — PATIENT INSTRUCTIONS
Preventive Health Recommendations  Male Ages 40 to 49    Yearly exam:             See your health care provider every year in order to  o   Review health changes.   o   Discuss preventive care.    o   Review your medicines if your doctor has prescribed any.    You should be tested each year for STDs (sexually transmitted diseases) if you re at risk.     Have a cholesterol test every 5 years.     Have a colonoscopy (test for colon cancer) if someone in your family has had colon cancer or polyps before age 50.     After age 45, have a diabetes test (fasting glucose). If you are at risk for diabetes, you should have this test every 3 years.      Talk with your health care provider about whether or not a prostate cancer screening test (PSA) is right for you.    Shots: Get a flu shot each year. Get a tetanus shot every 10 years.     Nutrition:    Eat at least 5 servings of fruits and vegetables daily.     Eat whole-grain bread, whole-wheat pasta and brown rice instead of white grains and rice.     Get adequate Calcium and Vitamin D.     Lifestyle    Exercise for at least 150 minutes a week (30 minutes a day, 5 days a week). This will help you control your weight and prevent disease.     Limit alcohol to one drink per day.     No smoking.     Wear sunscreen to prevent skin cancer.     See your dentist every six months for an exam and cleaning.        Patient Education     Low-Salt Choices  Eating salt (sodium) can make your body retain too much water. Extra water makes your heart work harder. Canned, packaged, and frozen foods are easy to prepare. But they are often high in sodium. Here are some ideas for low-salt foods you can easily make yourself.     For breakfast    Fruit or 100% fruit juice. It's better to have whole fruit instead of 100% fruit juice.    Whole-wheat bread or an English muffin. Look for sodium content on Nutrition Facts labels.    Low-fat milk or yogurt    Unsalted eggs    Shredded wheat    Corn  tortillas    Unsalted steamed rice    Regular (not instant) hot cereal, made without salt  Stay away from    Sausage, grijalva, and ham    Flour tortillas    Packaged muffins, pancakes, and biscuits    Instant hot cereals    Cottage cheese    For lunch and dinner    Fresh fish, chicken, turkey, or meat--baked, broiled, or roasted without salt    Dry beans, cooked without salt    Tofu, stir-fried without salt    Unsalted fresh fruit and vegetables, or frozen or canned fruit and vegetables with no added salt  Stay away from    Lunch or deli meat that is cured or smoked    Cheese    Tomato juice and ketchup    Canned vegetables, soups, and fish not labeled as no-salt-added or reduced sodium    Packaged gravies and sauces    Olives, pickles, and relish    Bottled salad dressings    For snacks and desserts    Yogurt    Unsalted, air-popped popcorn    Unsalted nuts or seeds  Stay away from    Pies and cakes    Packaged dessert mixes    Pizza    Canned and packaged puddings    Pretzels, chips, crackers, and nuts--unless the label says unsalted    [employer]Well last reviewed this educational content on 11/1/2019 2000-2020 [employer]. [address] [place] [address], [place] [address]. All rights reserved. This information is not intended as a substitute for professional medical care. Always follow your healthcare professional's instructions.

## 2021-01-20 NOTE — PROGRESS NOTES
3  SUBJECTIVE:   CC: Candelario Evangelista is an 50 year old male who presents for preventive health visit.       Patient has been advised of split billing requirements and indicates understanding: Yes     States intermittent dizziness the last two days. Lasts only a few seconds at a time. Has been seen in the ED several times in the past for dizziness. Does not have any dizziness currently. Dizziness occurs getting up from bed to fast, has some dizziness with lying down in bed. No blurry vision or changes in vision. Denies chest pain, shortness of breath, numbness, tingling, weakness. Was to get dental work done two days ago but did not go due to dizziness. Started taking left over clindamycin because he thinks his teeth are infected.    BP elevated at check in. Rechecked with manual cuff 122/80. Takes lisinopril 20 mg nightly. Drinks 50 to 60 ounces of water a day. Eating pre-made food and fast food. Drinks pop regularly.Does not add extra salt onto food.     Dad with colon cancer at age 60.   States had colonoscopy about 10 years ago.     Healthy Habits:    Do you get at least three servings of calcium containing foods daily (dairy, green leafy vegetables, etc.)? yes and No    Amount of exercise or daily activities, outside of work: None    Problems taking medications regularly No    Medication side effects: No    Have you had an eye exam in the past two years? yes    Do you see a dentist twice per year? no    Do you have sleep apnea, excessive snoring or daytime drowsiness?yes      Today's PHQ-2 Score:   PHQ-2 ( 1999 Pf[employer]) 9/23/2020 1/14/2020   Q1: Little interest or pleasure in doing things 0 1   Q2: Feeling down, depressed or hopeless 0 0   PHQ-2 Score 0 1   Q1: Little interest or pleasure in doing things - Several days   Q2: Feeling down, depressed or hopeless - Not at all   PHQ-2 Score - 1   Abuse: Current or Past(Physical, Sexual or Emotional)- No  Do you feel safe in your environment? Yes    Social History  "    Tobacco Use     Smoking status: Never Smoker     Smokeless tobacco: Never Used     Tobacco comment: second hand at work   Substance Use Topics     Alcohol use: No     If you drink alcohol do you typically have >3 drinks per day or >7 drinks per week? No                      Last PSA: No results found for: PSA    Reviewed orders with patient. Reviewed health maintenance and updated orders accordingly - Yes    Reviewed and updated as needed this visit by clinical staff  Tobacco  Allergies  Meds   Med Hx  Surg Hx  Fam Hx  Soc Hx      Reviewed and updated as needed this visit by Provider                ROS:  CONSTITUTIONAL: NEGATIVE for fever, chills, change in weight  INTEGUMENTARY/SKIN: NEGATIVE for worrisome rashes, moles or lesions  EYES: NEGATIVE for vision changes or irritation  RESP: NEGATIVE for significant cough or SOB  CV: NEGATIVE for chest pain, palpitations or peripheral edema  GI: NEGATIVE for nausea, abdominal pain, heartburn, or change in bowel habits   male: negative for dysuria, hematuria, decreased urinary stream, erectile dysfunction, urethral discharge  MUSCULOSKELETAL: NEGATIVE for significant arthralgias or myalgia  PSYCHIATRIC: NEGATIVE for changes in mood or affect    OBJECTIVE:   /80   Pulse 91   Temp 98.4  F (36.9  C) (Tympanic)   Resp 18   Ht 1.702 m (5' 7\")   Wt 92.6 kg (204 lb 3.2 oz)   SpO2 95%   BMI 31.98 kg/m    EXAM:  GENERAL: healthy, alert and no distress  EYES: Eyes grossly normal to inspection, PERRL and conjunctivae and sclerae normal  HENT: ear canals and TM's normal, multiple missing teeth, multiple caries without obvious acute infection  NECK: no adenopathy, no asymmetry, masses, or scars and thyroid normal to palpation  RESP: lungs clear to auscultation - no rales, rhonchi or wheezes  CV: regular rate and rhythm, normal S1 S2, no S3 or S4, no murmur, click or rub, no peripheral edema and peripheral pulses strong  ABDOMEN: soft, nontender, no " "hepatosplenomegaly, no masses and bowel sounds normal  MS: no gross musculoskeletal defects noted, no edema  SKIN: no suspicious lesions or rashes  NEURO: Normal strength and tone, sensory exam grossly normal, mentation intact, speech normal and cranial nerves 2-12 intact. No nystagmus.     ASSESSMENT/PLAN:   Candelario was seen today for physical.  Overall poor diet. History of cognitive developmental delay makes care more challenging. Discussed cutting back on pop, fast food, and highly processed foods to decrease sodium intake.     Diagnoses and all orders for this visit:    Routine general medical examination at a health care facility  -     Hepatitis C Screen Reflex to HCV RNA Quant and Genotype  -     GASTROENTEROLOGY ADULT REF PROCEDURE ONLY; Standing  -     Basic metabolic panel  -     Hemoglobin A1c  - Offered nutrition referral as patient had many questions about diet, patient declined.    Hypertension goal BP (blood pressure) < 140/90  -     Albumin Random Urine Quantitative with Creat Ratio  -     Basic metabolic panel    Dizziness -intermittent, no symptoms today. Benign neuro exam  -Advised to monitor symptoms. If worsening or neuro sxs, chest pain, sob advised to go to ED for evaluation.  -BP normal on recheck    Poor dentition  -Advised to not take old abx  -No acute dental infection today  -Dental appointment rescheduled per patient      Patient has been advised of split billing requirements and indicates understanding: Yes  COUNSELING:  Reviewed preventive health counseling, as reflected in patient instructions       Regular exercise       Healthy diet/nutrition       Consider Hep C screening for all patients one time for ages 18-79 years       HIV screeninx in teen years, 1x in adult years, and at intervals if high risk       Colon cancer screening    Estimated body mass index is 31.98 kg/m  as calculated from the following:    Height as of this encounter: 1.702 m (5' 7\").    Weight as of this " encounter: 92.6 kg (204 lb 3.2 oz).    Weight management plan: Discussed healthy diet and exercise guidelines    He reports that he has never smoked. He has never used smokeless tobacco.      DO ELLEN Topete [facility]

## 2021-01-21 LAB
CREAT UR-MCNC: 241 MG/DL
MICROALBUMIN UR-MCNC: 25 MG/L
MICROALBUMIN/CREAT UR: 10.5 MG/G CR (ref 0–17)

## 2021-03-26 DIAGNOSIS — I10 HYPERTENSION GOAL BP (BLOOD PRESSURE) < 140/90: ICD-10-CM

## 2021-03-29 RX ORDER — LISINOPRIL 20 MG/1
TABLET ORAL
Qty: 90 TABLET | Refills: 0 | Status: SHIPPED | OUTPATIENT
Start: 2021-03-29 | End: 2021-06-24

## 2021-04-20 ENCOUNTER — TELEPHONE (OUTPATIENT)
Dept: FAMILY MEDICINE | Facility: CLINIC | Age: 51
End: 2021-04-20

## 2021-04-20 ENCOUNTER — MYC REFILL (OUTPATIENT)
Dept: FAMILY MEDICINE | Facility: CLINIC | Age: 51
End: 2021-04-20

## 2021-04-20 DIAGNOSIS — F32.5 DEPRESSION, MAJOR, IN REMISSION (H): ICD-10-CM

## 2021-04-20 NOTE — TELEPHONE ENCOUNTER
Health Maintenance Due   Topic Date Due     ADVANCE CARE PLANNING  Never done     Pneumococcal Vaccine: Pediatrics (0 to 5 Years) and At-Risk Patients (6 to 64 Years) (1 of 2 - PPSV23) Never done     COLORECTAL CANCER SCREENING  Never done     COVID-19 Vaccine (1) Never done     DTAP/TDAP/TD IMMUNIZATION (2 - Td) 02/01/2019     ASTHMA ACTION PLAN  01/14/2021     ASTHMA CONTROL TEST  03/23/2021     PHQ-9  03/23/2021     ZOSTER IMMUNIZATION (1 of 2) 01/28/2021      Due for asthma and depression visit. Please call him to schedule an asthma and depression visit with me, can be virtual or in person, thank you!  Sincerely,  Dr. Norma Servin MD  4/20/2021

## 2021-04-21 RX ORDER — VENLAFAXINE HYDROCHLORIDE 75 MG/1
225 CAPSULE, EXTENDED RELEASE ORAL DAILY
Qty: 270 CAPSULE | Refills: 0 | OUTPATIENT
Start: 2021-04-21

## 2021-04-21 RX ORDER — VENLAFAXINE HYDROCHLORIDE 75 MG/1
225 CAPSULE, EXTENDED RELEASE ORAL DAILY
Qty: 270 CAPSULE | Refills: 0 | Status: SHIPPED | OUTPATIENT
Start: 2021-04-21 | End: 2021-07-16

## 2021-06-29 DIAGNOSIS — I10 HYPERTENSION GOAL BP (BLOOD PRESSURE) < 140/90: ICD-10-CM

## 2021-07-01 RX ORDER — LISINOPRIL 20 MG/1
TABLET ORAL
Qty: 90 TABLET | Refills: 0 | OUTPATIENT
Start: 2021-07-01

## 2021-07-01 NOTE — TELEPHONE ENCOUNTER
lisinopril (ZESTRIL) 20 MG tablet    Message sent to pharmacy - Request refused: Should already have refills on file (ORDER SENT 6/24/21 TO REQUESTING [facility] FOR 1 YR SUPPLY.).  Heena CASAS

## 2021-09-28 DIAGNOSIS — G47.33 OBSTRUCTIVE SLEEP APNEA SYNDROME: Primary | ICD-10-CM

## 2021-10-23 ENCOUNTER — HEALTH MAINTENANCE LETTER (OUTPATIENT)
Age: 51
End: 2021-10-23

## 2022-04-09 ENCOUNTER — HEALTH MAINTENANCE LETTER (OUTPATIENT)
Age: 52
End: 2022-04-09

## 2022-05-16 ENCOUNTER — TELEPHONE (OUTPATIENT)
Dept: FAMILY MEDICINE | Facility: CLINIC | Age: 52
End: 2022-05-16
Payer: COMMERCIAL

## 2022-05-16 DIAGNOSIS — F32.5 DEPRESSION, MAJOR, IN REMISSION (H): ICD-10-CM

## 2022-05-16 DIAGNOSIS — I10 HYPERTENSION GOAL BP (BLOOD PRESSURE) < 140/90: ICD-10-CM

## 2022-05-16 ASSESSMENT — PATIENT HEALTH QUESTIONNAIRE - PHQ9: SUM OF ALL RESPONSES TO PHQ QUESTIONS 1-9: 6

## 2022-05-16 NOTE — TELEPHONE ENCOUNTER
S-(situation): Patient calls stating he is having problems with his depression, Hemorrhoids and Will need medication refills.  He attempted to make an appointment to see Dr Servin and she was scheduling out to September.    B-(background): Depression Patient states his depression has been worse and he has had thought that he would be better off dead.  Has had passing thoughts of Suicide but has no plan and contracts with writer that he will not harm himself.  Would call the clinic or go to the ER first  Hemorrhoids:Patient has hemorrhoids that have been bleeding and he has had to put toilet paper over his rectum at times so it wouldn't get on his underwear or pants.  Has also had a discharge from the rectal area.  States he is due for colonoscopy but would like to get this taken care of first.  (Refills see other encounter)     A-(assessment): Patient is having increased issues with his depression and has hemorrhoids.    R-(recommendations): !) PHQ 9 completed during call, 2) Virtual Telephone appointment scheduled with Dr Servin for 5/24/22  Patient contracted for safety until meets with Dr Servin and will call if he feels need sooner appointment.  Corrie Michael RN  [facility]

## 2022-05-18 NOTE — TELEPHONE ENCOUNTER
Routing refill request to provider for review/approval because:   Blood pressure under 140/90 in past 12 months    PHQ-9 score of less than 5 in past 6 months    Normal serum creatinine on file in past 12 months     BP Readings from Last 3 Encounters:   01/20/21 122/80   10/15/20 112/87   10/01/20 130/84     PHQ-9 score:    PHQ 5/16/2022   PHQ-9 Total Score 6   Q9: Thoughts of better off dead/self-harm past 2 weeks Several days   F/U: Thoughts of suicide or self-harm -   F/U: Safety concerns -     Creatinine   Date Value Ref Range Status   01/20/2021 0.94 0.66 - 1.25 mg/dL Final     Upcoming appt 5/24/22    Lilly Harry RN  [facility]

## 2022-05-19 DIAGNOSIS — I10 HYPERTENSION GOAL BP (BLOOD PRESSURE) < 140/90: Primary | ICD-10-CM

## 2022-05-19 RX ORDER — VENLAFAXINE HYDROCHLORIDE 75 MG/1
225 CAPSULE, EXTENDED RELEASE ORAL DAILY
Qty: 270 CAPSULE | Refills: 0 | Status: SHIPPED | OUTPATIENT
Start: 2022-05-19 | End: 2022-07-14

## 2022-05-19 RX ORDER — LISINOPRIL 20 MG/1
20 TABLET ORAL DAILY
Qty: 90 TABLET | Refills: 0 | Status: SHIPPED | OUTPATIENT
Start: 2022-05-19 | End: 2022-09-11

## 2022-05-20 NOTE — CONFIDENTIAL NOTE
ASSESSMENT / PLAN:  (I10) Hypertension goal BP (blood pressure) < 140/90  (primary encounter diagnosis)  Comment: has appointment 5/24/22 with me, will refill medications.  Plan: REVIEW OF HEALTH MAINTENANCE PROTOCOL ORDERS,         Basic metabolic panel, Albumin Random Urine         Quantitative with Creat Ratio        Sincerely,  Dr. Norma Servin MD  5/19/2022

## 2022-09-11 ENCOUNTER — MYC REFILL (OUTPATIENT)
Dept: FAMILY MEDICINE | Facility: CLINIC | Age: 52
End: 2022-09-11

## 2022-09-11 DIAGNOSIS — I10 HYPERTENSION GOAL BP (BLOOD PRESSURE) < 140/90: ICD-10-CM

## 2022-09-14 RX ORDER — LISINOPRIL 20 MG/1
20 TABLET ORAL DAILY
Qty: 45 TABLET | Refills: 0 | Status: SHIPPED | OUTPATIENT
Start: 2022-09-14 | End: 2022-10-11

## 2022-09-14 NOTE — TELEPHONE ENCOUNTER
Routing refill request to provider for review/approval because:  --Last visit:  1/20/2021 [facility].  --RN spoke with patient  5/16/22 and scheduled appointment, but looks like patient cancelled.  --Patient read [employer] reminder sent 4/9/22.  --I sent another reminder today.    --Future Visit: none.        --Last Written Prescription Date:    Disp Refills Start End STANLEY   lisinopril (ZESTRIL) 20 MG tablet 90 tablet 0 5/19/2022  No   Sig - Route: Take 1 tablet (20 mg) by mouth daily - Oral         --Last visit:  1/20/2021     --Future Visit: none.

## 2022-09-14 NOTE — TELEPHONE ENCOUNTER
I am not able to find pharmacy with the info provided by patient.  [employer] message sent to patient asking him for address of pharmacy and asking him to schedule annual visit.

## 2022-09-15 DIAGNOSIS — F32.5 DEPRESSION, MAJOR, IN REMISSION (H): ICD-10-CM

## 2022-09-19 NOTE — TELEPHONE ENCOUNTER
Routing refill request to provider for review/approval because:  --Last visit:  1/20/2021 [facility].  --Last order was bharat with reminders.  --RN spoke with patient  5/16/22 and scheduled appointment, but looks like patient cancelled.  --Patient read [employer] reminder sent 4/9/22 and 9/14/22.  --Future Visit: none.      --Last Written Prescription Date:     Disp Refills Start End STANLEY   venlafaxine (EFFEXOR XR) 75 MG 24 hr capsule 90 capsule 0 7/18/2022  No   Sig - Route: Take 3 capsules (225 mg) by mouth daily APPOINTMENT NEED FOR FURTHER REFILLS. MAY SCHEDULE WITH ANY AVAILABLE PROVIDER - Oral   Sent to pharmacy as: Venlafaxine HCl ER 75 MG Oral Capsule Extended Release 24 Hour (EFFEXOR XR)   Class: E-Prescribe   Notes to Pharmacy: Over due for apt for further refills       PHQ 9/23/2020 4/21/2021 5/16/2022   PHQ-9 Total Score 0 0 6   Q9: Thoughts of better off dead/self-harm past 2 weeks Not at all Not at all Several days   F/U: Thoughts of suicide or self-harm - - -   F/U: Safety concerns - - -

## 2022-09-23 RX ORDER — VENLAFAXINE HYDROCHLORIDE 75 MG/1
CAPSULE, EXTENDED RELEASE ORAL
Qty: 21 CAPSULE | Refills: 0 | Status: SHIPPED | OUTPATIENT
Start: 2022-09-23 | End: 2022-10-13

## 2022-10-10 ENCOUNTER — HEALTH MAINTENANCE LETTER (OUTPATIENT)
Age: 52
End: 2022-10-10

## 2022-10-11 ENCOUNTER — MYC REFILL (OUTPATIENT)
Dept: FAMILY MEDICINE | Facility: CLINIC | Age: 52
End: 2022-10-11

## 2022-10-11 ENCOUNTER — MYC MEDICAL ADVICE (OUTPATIENT)
Dept: FAMILY MEDICINE | Facility: CLINIC | Age: 52
End: 2022-10-11

## 2022-10-11 DIAGNOSIS — I10 HYPERTENSION GOAL BP (BLOOD PRESSURE) < 140/90: ICD-10-CM

## 2022-10-11 NOTE — TELEPHONE ENCOUNTER
See 10/11/22 [employer] refill request.    Writer responded via [employer].    GAVIN Bazan, RN-BC  [facility]

## 2022-10-13 ENCOUNTER — MYC REFILL (OUTPATIENT)
Dept: FAMILY MEDICINE | Facility: CLINIC | Age: 52
End: 2022-10-13

## 2022-10-13 DIAGNOSIS — F32.5 DEPRESSION, MAJOR, IN REMISSION (H): ICD-10-CM

## 2022-10-13 RX ORDER — LISINOPRIL 20 MG/1
20 TABLET ORAL DAILY
Qty: 14 TABLET | Refills: 0 | Status: SHIPPED | OUTPATIENT
Start: 2022-10-13

## 2022-10-13 NOTE — TELEPHONE ENCOUNTER
Routing refill request to provider for review/approval because:  --Last visit:  1/20/2021.  --RN spoke with patient  5/16/22 and scheduled appointment, but looks like patient cancelled.  --Patient read [employer] reminders sent 4/9/22, 9/14/22 and 10/22/22.     --Future Visit: none.            --Last Written Prescription Date:     Disp Refills Start End STANLEY   lisinopril (ZESTRIL) 20 MG tablet 45 tablet 0 9/14/2022  No   Sig - Route: Take 1 tablet (20 mg) by mouth daily - Oral   Sent to pharmacy as: Lisinopril 20 MG Oral Tablet (ZESTRIL)   Class: E-Prescribe   Notes to Pharmacy: Medication is being filled for 1 time refill only due to:  PLEASE TELL SHARON HE WILL NEED AN OFFICE VISIT AT [facility] FOR ANY FURTHER ORDERS.

## 2022-10-14 RX ORDER — VENLAFAXINE HYDROCHLORIDE 75 MG/1
225 CAPSULE, EXTENDED RELEASE ORAL DAILY
Qty: 21 CAPSULE | Refills: 0 | Status: SHIPPED | OUTPATIENT
Start: 2022-10-14 | End: 2022-10-17

## 2022-10-14 NOTE — TELEPHONE ENCOUNTER
Routing refill request to provider for review/approval because:  --Last visit:  1/20/2021.  --Last two orders were bharat with reminder.  --RN spoke with patient  5/16/22 and scheduled appointment, but looks like patient cancelled.  --Patient read [employer] reminders sent 4/9/22, 9/14/22 and 10/11/22.    --Future Visit: none.            --Last Written Prescription Date:     Disp Refills Start End STANLEY   venlafaxine (EFFEXOR XR) 75 MG 24 hr capsule 21 capsule 0 9/23/2022  No   Sig: TAKE 3 CAPSULES BY MOUTH DAILY   Sent to pharmacy as: Venlafaxine HCl ER 75 MG Oral Capsule Extended Release 24 Hour (EFFEXOR XR)   Class: E-Prescribe   Notes to Pharmacy: Medication is being filled for 1 time refill only due to:  PLEASE ASK SHARON TO SCHEDULE A VISIT WITH ANY PROVIDER AT [facility] FOR ANY FURTHER ORDERS         PHQ 9/23/2020 4/21/2021 5/16/2022   PHQ-9 Total Score 0 0 6   Q9: Thoughts of better off dead/self-harm past 2 weeks Not at all Not at all Several days   F/U: Thoughts of suicide or self-harm - - -   F/U: Safety concerns - - -

## 2022-10-27 ENCOUNTER — MYC REFILL (OUTPATIENT)
Dept: FAMILY MEDICINE | Facility: CLINIC | Age: 52
End: 2022-10-27

## 2022-10-27 DIAGNOSIS — F32.5 DEPRESSION, MAJOR, IN REMISSION (H): ICD-10-CM

## 2022-10-28 RX ORDER — VENLAFAXINE HYDROCHLORIDE 75 MG/1
225 CAPSULE, EXTENDED RELEASE ORAL DAILY
Qty: 42 CAPSULE | Refills: 0 | Status: SHIPPED | OUTPATIENT
Start: 2022-10-28 | End: 2022-11-10

## 2022-10-28 NOTE — TELEPHONE ENCOUNTER
Routing refill request to provider for review/approval because:  Patient needs to be seen because it has been more than 1 year since last office visit.    Sari refill given- last ordered 30 capsules (10 day supply) 10/18/22    Asking for 2 week supply. pended    Nicole Shell, CALLYN RN  [facility]

## 2022-10-29 DIAGNOSIS — F32.5 DEPRESSION, MAJOR, IN REMISSION (H): ICD-10-CM

## 2022-10-31 RX ORDER — VENLAFAXINE HYDROCHLORIDE 75 MG/1
CAPSULE, EXTENDED RELEASE ORAL
Qty: 30 CAPSULE | OUTPATIENT
Start: 2022-10-31

## 2022-11-09 DIAGNOSIS — F32.5 DEPRESSION, MAJOR, IN REMISSION (H): ICD-10-CM

## 2022-11-10 NOTE — TELEPHONE ENCOUNTER
Routing refill request to provider for review/approval because:  --Last visit:  1/20/2021.  --Last 5 orders sent were bharat with reminders and tapered dispense.  --RN spoke with patient 5/16/22 and scheduled appointment, but looks like patient cancelled.  --Patient read [employer] reminders sent 4/9/22, 9/14/22, 10/11/22 and 10/14/22.   --Patient is not scheduling but continues to ask for refills.    --Future Visit: NONE.  --Can we deny?      --Last visit:  1/20/2021         --Last Written Prescription Date:       Disp Refills Start End STANLEY   venlafaxine (EFFEXOR XR) 75 MG 24 hr capsule 42 capsule 0 10/28/2022  No   Sig - Route: Take 3 capsules (225 mg) by mouth daily - Oral   Sent to pharmacy as: Venlafaxine HCl ER 75 MG Oral Capsule Extended Release 24 Hour (EFFEXOR XR)   Class: E-Prescribe   Notes to Pharmacy: LAST REFILL

## 2022-11-11 PROBLEM — F32.5 DEPRESSION, MAJOR, IN REMISSION (H): Status: RESOLVED | Noted: 2020-01-23 | Resolved: 2022-11-11

## 2022-11-11 PROBLEM — K12.0 ORAL APHTHAE: Status: RESOLVED | Noted: 2017-09-28 | Resolved: 2022-11-11

## 2022-11-11 RX ORDER — BENZONATATE 200 MG/1
CAPSULE ORAL
COMMUNITY
Start: 2022-11-08

## 2022-11-11 RX ORDER — NIRMATRELVIR AND RITONAVIR 300-100 MG
KIT ORAL
COMMUNITY
Start: 2022-11-08 | End: 2023-01-23

## 2022-11-11 RX ORDER — VENLAFAXINE HYDROCHLORIDE 75 MG/1
CAPSULE, EXTENDED RELEASE ORAL
Qty: 1 CAPSULE | Refills: 0 | Status: SHIPPED | OUTPATIENT
Start: 2022-11-11

## 2022-11-11 NOTE — TELEPHONE ENCOUNTER
"He is very overdue for HM, visits and labs, and  has not made an upcoming appointment. I can't refill his medication any longer.    Health Maintenance Due   Topic Date Due     ADVANCE CARE PLANNING  Never done     HEPATITIS B IMMUNIZATION (1 of 3 - 3-dose series) Never done     COLORECTAL CANCER SCREENING  Never done     DTAP/TDAP/TD IMMUNIZATION (2 - Td or Tdap) 02/01/2019     ASTHMA ACTION PLAN  01/14/2021     ZOSTER IMMUNIZATION (1 of 2) Never done     ASTHMA CONTROL TEST  03/23/2021     FRANDY ASSESSMENT  09/23/2021     YEARLY PREVENTIVE VISIT  01/20/2022     BMP  01/20/2022     MICROALBUMIN  01/20/2022     COVID-19 Vaccine (4 - Booster for Moderna series) 02/15/2022     INFLUENZA VACCINE (1) 09/01/2022     PHQ-9  11/16/2022      Estimated body mass index is 31.98 kg/m  as calculated from the following:    Height as of 1/20/21: 1.702 m (5' 7\").    Weight as of 1/20/21: 92.6 kg (204 lb 3.2 oz).   "

## 2023-05-27 ENCOUNTER — HEALTH MAINTENANCE LETTER (OUTPATIENT)
Age: 53
End: 2023-05-27

## (undated) RX ORDER — LIDOCAINE HYDROCHLORIDE 20 MG/ML
INJECTION, SOLUTION INFILTRATION; PERINEURAL
Status: DISPENSED
Start: 2017-11-28